# Patient Record
Sex: FEMALE | Race: WHITE | Employment: UNEMPLOYED | URBAN - METROPOLITAN AREA
[De-identification: names, ages, dates, MRNs, and addresses within clinical notes are randomized per-mention and may not be internally consistent; named-entity substitution may affect disease eponyms.]

---

## 2019-02-19 ENCOUNTER — APPOINTMENT (OUTPATIENT)
Dept: GENERAL RADIOLOGY | Age: 49
DRG: 316 | End: 2019-02-19
Attending: PHYSICIAN ASSISTANT
Payer: COMMERCIAL

## 2019-02-19 ENCOUNTER — HOSPITAL ENCOUNTER (INPATIENT)
Age: 49
LOS: 12 days | Discharge: HOME | DRG: 316 | End: 2019-03-06
Attending: EMERGENCY MEDICINE | Admitting: HOSPITALIST
Payer: COMMERCIAL

## 2019-02-19 DIAGNOSIS — B18.2 CHRONIC HEPATITIS C WITHOUT HEPATIC COMA (HCC): ICD-10-CM

## 2019-02-19 DIAGNOSIS — M65.10 INFECTION OF TENDON SHEATH: Primary | ICD-10-CM

## 2019-02-19 PROBLEM — L03.119 CELLULITIS OF HAND: Status: ACTIVE | Noted: 2019-02-19

## 2019-02-19 LAB
ALBUMIN SERPL-MCNC: 3.6 G/DL (ref 3.5–5)
ALBUMIN/GLOB SERPL: 0.9 {RATIO} (ref 1.1–2.2)
ALP SERPL-CCNC: 133 U/L (ref 45–117)
ALT SERPL-CCNC: 257 U/L (ref 12–78)
AMPHET UR QL SCN: POSITIVE
ANION GAP SERPL CALC-SCNC: 6 MMOL/L (ref 5–15)
AST SERPL-CCNC: 173 U/L (ref 15–37)
BARBITURATES UR QL SCN: NEGATIVE
BASOPHILS # BLD: 0 K/UL (ref 0–0.1)
BASOPHILS NFR BLD: 1 % (ref 0–1)
BENZODIAZ UR QL: NEGATIVE
BILIRUB SERPL-MCNC: 0.6 MG/DL (ref 0.2–1)
BUN SERPL-MCNC: 13 MG/DL (ref 6–20)
BUN/CREAT SERPL: 18 (ref 12–20)
CALCIUM SERPL-MCNC: 8.6 MG/DL (ref 8.5–10.1)
CANNABINOIDS UR QL SCN: NEGATIVE
CHLORIDE SERPL-SCNC: 102 MMOL/L (ref 97–108)
CO2 SERPL-SCNC: 29 MMOL/L (ref 21–32)
COCAINE UR QL SCN: POSITIVE
CREAT SERPL-MCNC: 0.74 MG/DL (ref 0.55–1.02)
DIFFERENTIAL METHOD BLD: ABNORMAL
DRUG SCRN COMMENT,DRGCM: ABNORMAL
EOSINOPHIL # BLD: 0.2 K/UL (ref 0–0.4)
EOSINOPHIL NFR BLD: 4 % (ref 0–7)
ERYTHROCYTE [DISTWIDTH] IN BLOOD BY AUTOMATED COUNT: 13.2 % (ref 11.5–14.5)
ETHANOL SERPL-MCNC: <10 MG/DL
GLOBULIN SER CALC-MCNC: 4.1 G/DL (ref 2–4)
GLUCOSE SERPL-MCNC: 87 MG/DL (ref 65–100)
HCG UR QL: NEGATIVE
HCT VFR BLD AUTO: 41.6 % (ref 35–47)
HGB BLD-MCNC: 13.8 G/DL (ref 11.5–16)
IMM GRANULOCYTES # BLD AUTO: 0 K/UL (ref 0–0.04)
IMM GRANULOCYTES NFR BLD AUTO: 0 % (ref 0–0.5)
LACTATE BLD-SCNC: 0.59 MMOL/L (ref 0.4–2)
LYMPHOCYTES # BLD: 1.5 K/UL (ref 0.8–3.5)
LYMPHOCYTES NFR BLD: 26 % (ref 12–49)
MCH RBC QN AUTO: 31.9 PG (ref 26–34)
MCHC RBC AUTO-ENTMCNC: 33.2 G/DL (ref 30–36.5)
MCV RBC AUTO: 96.1 FL (ref 80–99)
METHADONE UR QL: POSITIVE
MONOCYTES # BLD: 0.9 K/UL (ref 0–1)
MONOCYTES NFR BLD: 15 % (ref 5–13)
NEUTS SEG # BLD: 3.1 K/UL (ref 1.8–8)
NEUTS SEG NFR BLD: 54 % (ref 32–75)
NRBC # BLD: 0 K/UL (ref 0–0.01)
NRBC BLD-RTO: 0 PER 100 WBC
OPIATES UR QL: POSITIVE
PCP UR QL: NEGATIVE
PLATELET # BLD AUTO: 182 K/UL (ref 150–400)
PMV BLD AUTO: 10.6 FL (ref 8.9–12.9)
POTASSIUM SERPL-SCNC: 4 MMOL/L (ref 3.5–5.1)
PROT SERPL-MCNC: 7.7 G/DL (ref 6.4–8.2)
RBC # BLD AUTO: 4.33 M/UL (ref 3.8–5.2)
SODIUM SERPL-SCNC: 137 MMOL/L (ref 136–145)
WBC # BLD AUTO: 5.7 K/UL (ref 3.6–11)

## 2019-02-19 PROCEDURE — 96365 THER/PROPH/DIAG IV INF INIT: CPT

## 2019-02-19 PROCEDURE — 74011250636 HC RX REV CODE- 250/636: Performed by: PHYSICIAN ASSISTANT

## 2019-02-19 PROCEDURE — 73130 X-RAY EXAM OF HAND: CPT

## 2019-02-19 PROCEDURE — 36415 COLL VENOUS BLD VENIPUNCTURE: CPT

## 2019-02-19 PROCEDURE — 96366 THER/PROPH/DIAG IV INF ADDON: CPT

## 2019-02-19 PROCEDURE — 96368 THER/DIAG CONCURRENT INF: CPT

## 2019-02-19 PROCEDURE — 74011000258 HC RX REV CODE- 258: Performed by: PHYSICIAN ASSISTANT

## 2019-02-19 PROCEDURE — 74011250636 HC RX REV CODE- 250/636: Performed by: EMERGENCY MEDICINE

## 2019-02-19 PROCEDURE — 80307 DRUG TEST PRSMV CHEM ANLYZR: CPT

## 2019-02-19 PROCEDURE — 80053 COMPREHEN METABOLIC PANEL: CPT

## 2019-02-19 PROCEDURE — 83605 ASSAY OF LACTIC ACID: CPT

## 2019-02-19 PROCEDURE — 99218 HC RM OBSERVATION: CPT

## 2019-02-19 PROCEDURE — 99285 EMERGENCY DEPT VISIT HI MDM: CPT

## 2019-02-19 PROCEDURE — 87040 BLOOD CULTURE FOR BACTERIA: CPT

## 2019-02-19 PROCEDURE — 74011250637 HC RX REV CODE- 250/637: Performed by: HOSPITALIST

## 2019-02-19 PROCEDURE — 81025 URINE PREGNANCY TEST: CPT

## 2019-02-19 PROCEDURE — 96375 TX/PRO/DX INJ NEW DRUG ADDON: CPT

## 2019-02-19 PROCEDURE — 85025 COMPLETE CBC W/AUTO DIFF WBC: CPT

## 2019-02-19 RX ORDER — SODIUM CHLORIDE 0.9 % (FLUSH) 0.9 %
5-40 SYRINGE (ML) INJECTION EVERY 8 HOURS
Status: DISCONTINUED | OUTPATIENT
Start: 2019-02-19 | End: 2019-03-06 | Stop reason: HOSPADM

## 2019-02-19 RX ORDER — SODIUM CHLORIDE 0.9 % (FLUSH) 0.9 %
5-40 SYRINGE (ML) INJECTION AS NEEDED
Status: DISCONTINUED | OUTPATIENT
Start: 2019-02-19 | End: 2019-03-06 | Stop reason: HOSPADM

## 2019-02-19 RX ORDER — METHADONE HYDROCHLORIDE 5 MG/5ML
55 SOLUTION ORAL DAILY
Status: DISCONTINUED | OUTPATIENT
Start: 2019-02-20 | End: 2019-02-27

## 2019-02-19 RX ORDER — KETOROLAC TROMETHAMINE 30 MG/ML
15 INJECTION, SOLUTION INTRAMUSCULAR; INTRAVENOUS
Status: COMPLETED | OUTPATIENT
Start: 2019-02-19 | End: 2019-02-19

## 2019-02-19 RX ORDER — VANCOMYCIN/0.9 % SOD CHLORIDE 1.5G/250ML
1500 PLASTIC BAG, INJECTION (ML) INTRAVENOUS
Status: COMPLETED | OUTPATIENT
Start: 2019-02-19 | End: 2019-02-19

## 2019-02-19 RX ORDER — IBUPROFEN 400 MG/1
400 TABLET ORAL 3 TIMES DAILY
Status: DISCONTINUED | OUTPATIENT
Start: 2019-02-19 | End: 2019-03-06 | Stop reason: HOSPADM

## 2019-02-19 RX ADMIN — Medication 10 ML: at 17:44

## 2019-02-19 RX ADMIN — VANCOMYCIN HYDROCHLORIDE 1500 MG: 10 INJECTION, POWDER, LYOPHILIZED, FOR SOLUTION INTRAVENOUS at 12:19

## 2019-02-19 RX ADMIN — KETOROLAC TROMETHAMINE 15 MG: 30 INJECTION, SOLUTION INTRAMUSCULAR at 12:01

## 2019-02-19 RX ADMIN — IBUPROFEN 400 MG: 400 TABLET ORAL at 17:43

## 2019-02-19 RX ADMIN — PIPERACILLIN SODIUM AND TAZOBACTAM SODIUM 3.38 G: 3; .375 INJECTION, POWDER, LYOPHILIZED, FOR SOLUTION INTRAVENOUS at 11:56

## 2019-02-19 RX ADMIN — IBUPROFEN 400 MG: 400 TABLET ORAL at 20:36

## 2019-02-19 RX ADMIN — Medication 10 ML: at 20:36

## 2019-02-19 NOTE — PROGRESS NOTES
Pharmacist Note - Vancomycin Dosing    Consult provided for this 53 y.o. female for indication of right hand middle finger pain/swelling    Antibiotic regimen(s): Zosyn+vancomycin    Recent Labs     19  1113   WBC 5.7   CREA 0.74   BUN 13     Frequency of BMP: daily  Height: 162.6 cm  Weight: 56.7 kg  Est CrCl: ~ 80  ml/min; Temp (24hrs), Av °F (36.7 °C), Min:97.7 °F (36.5 °C), Max:98.2 °F (36.8 °C)    Cultures:   blood in process    Goal trough = 10 - 15 mcg/mL    Therapy will be initiated with a loading dose of 1500 mg IV x 1 to be followed by a maintenance dose of 1000 mg IV every 12 hours. Pharmacy to follow patient daily and order levels / make dose adjustments as appropriate.
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
## 2019-02-19 NOTE — ED NOTES
10:46 AM 
I have evaluated the patient as the Provider in Triage. I have reviewed Her vital signs and the triage nurse assessment. I have talked with the patient and any available family and advised that I am the provider in triage and have ordered the appropriate study to initiate their work up based on the clinical presentation during my assessment. I have advised that the patient will be accommodated in the Main ED as soon as possible. I have also requested to contact the triage nurse or myself immediately if the patient experiences any changes in their condition during this brief waiting period. TIM Caro 
 
 
54 y/o female with right hand/finger cellulitis concerning for infectious tenosynovitis. Will order basic labs, xr right hand to evaluate for any possible FB and start IV abx. Discussed with and evaluated by Dr. Rajesh Freedman

## 2019-02-19 NOTE — ED TRIAGE NOTES
Pt slammed her right finger in a wood door three days ago and states that it started swelling and throbbing yesterday. Pt states that she had slight chills last night. Finger is swollen, red and warm to touch.

## 2019-02-19 NOTE — PROGRESS NOTES
Problem: Falls - Risk of  Goal: *Absence of Falls  Document [facility] Fall Risk and appropriate interventions in the flowsheet.   Outcome: Progressing Towards Goal  Fall Risk Interventions:

## 2019-02-19 NOTE — ED PROVIDER NOTES
53 y.o. female with past medical history significant for hepatitis C, who presents ambulatory to the ED with cc of hand pain. Pt states she sustained an injury to the 3rd digit of her right hand 5 days ago caused by \"it got slammed in a door. \" She states it was sore afterwards, but appeared to improve. She notes there was a [employer]" initially that may have been a splinter but it has disappeared. However, she reports a blister developed over the palmar aspect of the DIP 2 days ago. She states symptoms worsened yesterday with swelling, redness, and \"throbbing\" pain throughout her right hand. She notes she applied a topical OTC ointment to the blister and iced her hand without improvement. She notes she is right-handed. Pt also reports history of IV heroin use, most recently 3 days ago. She states she goes to a Methadone clinic daily. Pt specifically denies any fever. There are no other acute medical concerns at this time. Social Hx: daily Tobacco use (1 ppd), denies EtOH use, yes Illicit Drug use (heroin, methadone)  PCP: None    Note written by Glendy Guerrier, as dictated by Alexx Cotton MD 11:10 AM        The history is provided by the patient. No  was used. Past Medical History:   Diagnosis Date    Liver disease     hep c       Past Surgical History:   Procedure Laterality Date    BREAST SURGERY PROCEDURE UNLISTED      lumpectomy    HX GYN               History reviewed. No pertinent family history.     Social History     Socioeconomic History    Marital status: Not on file     Spouse name: Not on file    Number of children: Not on file    Years of education: Not on file    Highest education level: Not on file   Social Needs    Financial resource strain: Not on file    Food insecurity - worry: Not on file    Food insecurity - inability: Not on file    Transportation needs - medical: Not on file    Transportation needs - non-medical: Not on file Occupational History    Not on file   Tobacco Use    Smoking status: Current Every Day Smoker     Packs/day: 1.00    Smokeless tobacco: Never Used   Substance and Sexual Activity    Alcohol use: No     Frequency: Never    Drug use: Yes     Types: Prescription     Comment: pt is on methadone    Sexual activity: Not on file   Other Topics Concern    Not on file   Social History Narrative    Not on file         ALLERGIES: Patient has no known allergies. Review of Systems   Constitutional: Negative for appetite change and fever. HENT: Negative for rhinorrhea, sore throat and trouble swallowing. Eyes: Negative for photophobia. Respiratory: Negative for cough and shortness of breath. Cardiovascular: Negative for chest pain and palpitations. Gastrointestinal: Negative for abdominal pain, nausea and vomiting. Genitourinary: Negative for dysuria, frequency and hematuria. Musculoskeletal: Negative for arthralgias. Skin: Positive for wound (blister R hand 3rd digit). + redness, swelling of R hand   Neurological: Negative for dizziness, syncope and weakness. Psychiatric/Behavioral: Negative for behavioral problems. The patient is not nervous/anxious. Vitals:    02/19/19 1025 02/19/19 1039 02/19/19 1042   BP:   (!) 134/91   Pulse: 87  83   Resp:   16   Temp:   97.7 °F (36.5 °C)   SpO2: 98%  98%   Weight:  56.7 kg (125 lb)    Height:  5' 4\" (1.626 m)             Physical Exam   Constitutional: She appears well-developed and well-nourished. HENT:   Head: Normocephalic and atraumatic. Mouth/Throat: Oropharynx is clear and moist.   Eyes: EOM are normal. Pupils are equal, round, and reactive to light. Neck: Normal range of motion. Neck supple. Cardiovascular: Normal rate, regular rhythm, normal heart sounds and intact distal pulses. Exam reveals no gallop and no friction rub. No murmur heard. Pulmonary/Chest: Effort normal. No respiratory distress. She has no wheezes.  She has no rales. Abdominal: Soft. There is no tenderness. There is no rebound. Musculoskeletal: Normal range of motion. Moderate swelling of the R middle finger, unable to flex 2nd and 3rd fingers   Neurological: She is alert. No cranial nerve deficit. Motor; symmetric   Skin:   Blister over palmar aspect of R 3rd DIP   Psychiatric: She has a normal mood and affect. Her behavior is normal.   Nursing note and vitals reviewed. Note written by Allison Field, as dictated by Glenn Cantu MD 11:10 AM    MDM       Procedures    CONSULT NOTE:  12:29 PM Glenn Cantu MD spoke with Dr. Jason Cunningham, Consult for Hospitalist.  Discussed available diagnostic tests and clinical findings. Dr. Sincere Mckeon will admit. CONSULT NOTE:   Glenn Cantu MD spoke with Dr. Dean Barber and Will TIM Youssef, Consult for Orthopedics. Discussed available diagnostic tests and clinical findings. They have consulted and seen the pt.    2:24 PM  Talked to Christen Armas, hospital supervisor, wanted me to call head of orthopedics Dr. Wanda Vasquez and if no one is available then we should consult [facility]. Note: Patient has been seen by medicine who admitted her. The orthopedic physician assistant and orthopedist have seen the patient. [facility] who is on call does not have a hand surgeon available. Patient is stable. She has received IV antibiotics ordered in triage. She does not feel like the infection has spread from her palm area since arriving in the ER. Orthopedic group has requested that the patient be transferred . I contacted hospital administration and Christen Armas came to the department. She says I'm supposed to contact the head of the orthopedic group. I am told that Dr. Lora Zarate is in surgery all day and is unlikely get back to me anytime soon. Keenan Cuellar MD  3:04 PM    CONSULT NOTE:  3:08 PM Glenn Cantu MD spoke with Dr. Tyler Montoya. Rex Edwards for [employer].   Discussed available diagnostic tests and clinical findings. Dr. Treva Demarco will see pt. States if she doesn't need surgery today, he will follow her. If she does need surgery today he will transfer her.

## 2019-02-19 NOTE — CONSULTS
ORTHO CONSULT NOTE    Date of Consultation:  2019  Referring Physician:  Makenna Kang MD  CC: right hand pain    HPI:  Jose Zamarripa is a 53 y. o.right hand dom female PMH Hep C, illicit drug use, and currently at methadone clinic who c/o right hand middle finger pain/swelling. She describes the middle finger getting caught in a wooden door at home about 5 days ago with initial soreness. She denies an obvious laceration. She did not see a splinter or FB. The throbbing pain and swelling worsened last night causing her to seek care here at [facility] ER. She has been painting skin with OTC topical anti-septic but denies drainage and open wound. She felt feverish last night but did not take her temperature. Last oral intake last night. She denies previous orthopedic surgery. History reviewed. No pertinent family history. Social History     Tobacco Use    Smoking status: Current Every Day Smoker     Packs/day: 1.00    Smokeless tobacco: Never Used   Substance Use Topics    Alcohol use: No     Frequency: Never     Past Medical History:   Diagnosis Date    Liver disease     hep c      Past Surgical History:   Procedure Laterality Date    BREAST SURGERY PROCEDURE UNLISTED      lumpectomy    HX GYN            Prior to Admission medications    Medication Sig Start Date End Date Taking? Authorizing Provider   methadone (DOLOPHINE) 10 mg/mL solution Take 55 mg by mouth daily. Yes Provider, Historical     No Known Allergies     Review of Systems:  Per HPI.       Objective:     Patient Vitals for the past 8 hrs:   BP Temp Pulse Resp SpO2 Height Weight   19 1315 118/79 98.2 °F (36.8 °C) 64 16 98 % -- --   19 1300 126/82 -- -- -- 98 % -- --   19 1230 121/84 -- -- -- 97 % -- --   19 1130 128/84 -- -- -- 99 % -- --   19 1042 (!) 134/91 97.7 °F (36.5 °C) 83 16 98 % -- --   19 1039 -- -- -- -- -- 5' 4\" (1.626 m) 56.7 kg (125 lb)   19 1025 -- -- 87 -- 98 % -- --     Temp (24hrs), Av °F (36.7 °C), Min:97.7 °F (36.5 °C), Max:98.2 °F (36.8 °C)      EXAM:   NAD. Lying in ER stretcher. Moves left UE well. Right hand volar swelling middle finger DIP extending proximally into palm with corresponding tenderness. There is no open wound. Mild tenderness volar index as well. Unable to actively/passively extend middle finger d/t pain. I can passively extend other fingers without pain. Erythema volar and dorsal hand with no streaking. Moves wrist/elbow OK. Strong pulses. CR brisk. SILT all digits. Tattoos bilat UE forearm. Imaging Review:   Results from Hospital Encounter encounter on 19   XR HAND RT MIN 3 V    Narrative EXAM: XR HAND RT MIN 3 V    INDICATION: eval for foreign body in right 3rd finger. COMPARISON: None. FINDINGS: Three views of the right hand are limited by patient inability to  completely extend their digits. The examination demonstrates no fracture or  other acute osseous or articular abnormality. Soft tissue swelling of the third  digit is noted without radiopaque foreign body. Impression IMPRESSION: Soft tissue swelling third digit without fracture or radiopaque  foreign body. No results found for this or any previous visit. Labs:   WBC 5.7, elevated liver enzymes, eGFR > 60. tox screen + amphetamines, cocaine, and methadone. Impression:     Patient Active Problem List    Diagnosis Date Noted    Cellulitis of hand 2019     Principal Problem:    Cellulitis of hand (2019)    Concern for septic flexor tenosynovitis right hand middle finger. Plan:   Patient examined by myself and Dr. Ana Valiente covering unreferred orthopedic call. Given concerns for septic hand tenosynovitis, patient needs eval by fellowship hand/wrist surgeon for I&D. As of this time, there is no hand surgeon available here at [facility]. Surgeon recommends transfer to facility with ortho hand call schedule.     D/W  TIM Mensah

## 2019-02-19 NOTE — PROGRESS NOTES
TRANSFER - IN REPORT:    Verbal report received from Tere(name) on Jarrell Esposito  being received from ED(unit) for routine progression of care      Report consisted of patients Situation, Background, Assessment and   Recommendations(SBAR). Information from the following report(s) SBAR, ED Summary, Intake/Output and MAR was reviewed with the receiving nurse. Opportunity for questions and clarification was provided. Assessment completed upon patients arrival to unit and care assumed.

## 2019-02-19 NOTE — ED NOTES
Patient resting comfortably in bed, bed locked & low, call bell within reach. Vancomycin infusing via PIV. Patient denies further needs at this time.

## 2019-02-19 NOTE — ED NOTES
Dr. Adeline Lopez at bedside    Assumed care of patient from [facility]. Pt shut hand in door 5 days ago. Right pointer and middle finger swelling also  blister and ecchymosis present on middle finger. Pt states finger was sore a few days and became worse last night at work. Pt felt feverish at work but did not take temperature.

## 2019-02-19 NOTE — ROUTINE PROCESS
TRANSFER - OUT REPORT:    Verbal report given to Niurka(name) on Christensen Bars  being transferred to [facility](unit) for routine progression of care       Report consisted of patients Situation, Background, Assessment and   Recommendations(SBAR). Information from the following report(s) SBAR, ED Summary, Intake/Output, MAR and Recent Results was reviewed with the receiving nurse. Lines:   Peripheral IV 02/19/19 Left Antecubital (Active)       Peripheral IV 02/19/19 Left Forearm (Active)        Opportunity for questions and clarification was provided.       Patient transported with:   [place]

## 2019-02-19 NOTE — PROGRESS NOTES
Admission Medication Reconciliation:    Information obtained from: [facility] nurse Amanda Males    Significant PMH/Disease States:   Past Medical History:   Diagnosis Date    Liver disease     hep c       Chief Complaint for this Admission:  Hand problem    Allergies:  Patient has no known allergies. Prior to Admission Medications:   Prior to Admission Medications   Prescriptions Last Dose Informant Patient Reported? Taking? methadone (DOLOPHINE) 10 mg/mL solution 2/19/2019 at Unknown time  Yes Yes   Sig: Take 55 mg by mouth daily. Facility-Administered Medications: None         Comments/Recommendations: Facility confirmed methadone dose after consent was signed and faxed back to facility. Updated Dr Lesly Arboleda. Added methadone. Thank you for allowing me to participate in the care of your patient.     Suraj Bray PharmD, RN #[id number]

## 2019-02-19 NOTE — CONSULTS
Consult    Patient: Eloisa Roca MRN: [id number]  SSN: [id number]    YOB: 1970  Age: 53 y.o. Sex: female      Subjective:      Eloisa Roca is a 53 y.o. female who is being seen for her right hand pain and swelling in ER which is progressing in last day. She does have PMHx of Hep C, illicit drug use, and developed right hand middle finger pain/swelling which is impacting her . She describes the middle finger getting caught in a wooden door at home about 5 days ago with initial pain that mostly resolved. Her pain and swelling worsened last night causing her to seek care here at [facility] ER. She denies open injury or open wound. She felt like she may have had fever/chills last night but did not take her temperature. Last oral intake last night. Past Medical History:   Diagnosis Date    Liver disease     hep c     Past Surgical History:   Procedure Laterality Date    BREAST SURGERY PROCEDURE UNLISTED      lumpectomy    HX GYN            History reviewed. No pertinent family history. Social History     Tobacco Use    Smoking status: Current Every Day Smoker     Packs/day: 1.00    Smokeless tobacco: Never Used   Substance Use Topics    Alcohol use: No     Frequency: Never      Current Facility-Administered Medications   Medication Dose Route Frequency Provider Last Rate Last Dose    Vancomycin Pharmacy Dosing   Other PRN Helder Conner MD        [START ON 2019] vancomycin (VANCOCIN) 1,000 mg in 0.9% sodium chloride (MBP/ADV) 250 mL  1,000 mg IntraVENous Q12H Helder Conner MD         Current Outpatient Medications   Medication Sig Dispense Refill    methadone (DOLOPHINE) 10 mg/mL solution Take 55 mg by mouth daily. No Known Allergies    Review of Systems:  A comprehensive review of systems was negative except for that written in the History of Present Illness.     Objective:     Vitals:    19 1330 19 1415 19 1445 19 1500   BP: 121/82 126/81 134/81 133/82   Pulse:    72   Resp:    16   Temp:    98.2 °F (36.8 °C)   SpO2: 97% 97% 98% 98%   Weight:       Height:            Physical Exam:  General: Awake and alert, No acute distress  Lungs: No obvious labored breathing present; Breath sounds are present  Heart: S1/S2 are audible  Extremities:   RUE: No open wounds are present. No lacerations. No active drainage. Pain and with range of motion of the right middle finger with fusiform middle finger swelling. Extension of the finger is very painful and swelling limits full extension. There is erythema throughout the plamar>dorsal aspects of the middle finger radiating to the palm. +2 radial pulse. Sensation intact. Limited motion of the middle finger PIP/DIP MCP joints due to pain. There is evidence of lymph tracting to the dsital forearm. I can passively extend other fingers without pain. Sensation grossly intact throughout      Exam of the contralateral extremity reveals excellent motion, no lesion of the skin, NVI and no lympadema      Imaging: XR right hand 3 views demonstrates soft tissue swelling of middle digit.  No foreign body or fracture is seen    Assessment:     Hospital Problems  Date Reviewed: 2/19/2019          Codes Class Noted POA    * (Principal) Cellulitis of hand ICD-10-CM: M93.827  ICD-9-CM: 682.4  2/19/2019 Unknown          Right middle finger flexor tenosynovitis    Plan:   Keep patient NPO  Will discuss with hand team (Dr Steven Dent) for definitive management as it will likely involve open surgical debridement by Hand specialist acutely  If hand team cannot manage recommend transfer to facility with hand coverage        Signed By: Kasi Davis MD     February 19, 2019

## 2019-02-20 ENCOUNTER — ANESTHESIA (OUTPATIENT)
Dept: MEDSURG UNIT | Age: 49
DRG: 316 | End: 2019-02-20
Payer: COMMERCIAL

## 2019-02-20 ENCOUNTER — ANESTHESIA EVENT (OUTPATIENT)
Dept: MEDSURG UNIT | Age: 49
DRG: 316 | End: 2019-02-20
Payer: COMMERCIAL

## 2019-02-20 LAB
ERYTHROCYTE [DISTWIDTH] IN BLOOD BY AUTOMATED COUNT: 13.2 % (ref 11.5–14.5)
HCG UR QL: NEGATIVE
HCT VFR BLD AUTO: 40.2 % (ref 35–47)
HGB BLD-MCNC: 13.1 G/DL (ref 11.5–16)
INR PPP: 1.1 (ref 0.9–1.1)
MCH RBC QN AUTO: 31.1 PG (ref 26–34)
MCHC RBC AUTO-ENTMCNC: 32.6 G/DL (ref 30–36.5)
MCV RBC AUTO: 95.5 FL (ref 80–99)
NRBC # BLD: 0 K/UL (ref 0–0.01)
NRBC BLD-RTO: 0 PER 100 WBC
PLATELET # BLD AUTO: 190 K/UL (ref 150–400)
PMV BLD AUTO: 10.6 FL (ref 8.9–12.9)
PROTHROMBIN TIME: 10.9 SEC (ref 9–11.1)
RBC # BLD AUTO: 4.21 M/UL (ref 3.8–5.2)
WBC # BLD AUTO: 4.4 K/UL (ref 3.6–11)

## 2019-02-20 PROCEDURE — 76210000034 HC AMBSU PH I REC 0.5 TO 1 HR: Performed by: ORTHOPAEDIC SURGERY

## 2019-02-20 PROCEDURE — 94760 N-INVAS EAR/PLS OXIMETRY 1: CPT

## 2019-02-20 PROCEDURE — 87147 CULTURE TYPE IMMUNOLOGIC: CPT

## 2019-02-20 PROCEDURE — 76060000061 HC AMB SURG ANES 0.5 TO 1 HR: Performed by: ORTHOPAEDIC SURGERY

## 2019-02-20 PROCEDURE — 36600 WITHDRAWAL OF ARTERIAL BLOOD: CPT

## 2019-02-20 PROCEDURE — 99218 HC RM OBSERVATION: CPT

## 2019-02-20 PROCEDURE — 74011250636 HC RX REV CODE- 250/636

## 2019-02-20 PROCEDURE — 87077 CULTURE AEROBIC IDENTIFY: CPT

## 2019-02-20 PROCEDURE — 0LD70ZZ EXTRACTION OF RIGHT HAND TENDON, OPEN APPROACH: ICD-10-PCS | Performed by: ORTHOPAEDIC SURGERY

## 2019-02-20 PROCEDURE — 87205 SMEAR GRAM STAIN: CPT

## 2019-02-20 PROCEDURE — 36415 COLL VENOUS BLD VENIPUNCTURE: CPT

## 2019-02-20 PROCEDURE — 77030020782 HC GWN BAIR PAWS FLX 3M -B

## 2019-02-20 PROCEDURE — 74011000250 HC RX REV CODE- 250: Performed by: ORTHOPAEDIC SURGERY

## 2019-02-20 PROCEDURE — 81025 URINE PREGNANCY TEST: CPT

## 2019-02-20 PROCEDURE — 85027 COMPLETE CBC AUTOMATED: CPT

## 2019-02-20 PROCEDURE — 74011000250 HC RX REV CODE- 250

## 2019-02-20 PROCEDURE — 74011250636 HC RX REV CODE- 250/636: Performed by: HOSPITALIST

## 2019-02-20 PROCEDURE — 85610 PROTHROMBIN TIME: CPT

## 2019-02-20 PROCEDURE — 74011250637 HC RX REV CODE- 250/637: Performed by: HOSPITALIST

## 2019-02-20 PROCEDURE — 87186 SC STD MICRODIL/AGAR DIL: CPT

## 2019-02-20 PROCEDURE — 74011000258 HC RX REV CODE- 258: Performed by: HOSPITALIST

## 2019-02-20 PROCEDURE — 77030013479 HC CUF TRNQ COT DGT MARM -A: Performed by: ORTHOPAEDIC SURGERY

## 2019-02-20 PROCEDURE — 77030002916 HC SUT ETHLN J&J -A: Performed by: ORTHOPAEDIC SURGERY

## 2019-02-20 PROCEDURE — 76030000000 HC AMB SURG OR TIME 0.5 TO 1: Performed by: ORTHOPAEDIC SURGERY

## 2019-02-20 PROCEDURE — 77030018836 HC SOL IRR NACL ICUM -A: Performed by: ORTHOPAEDIC SURGERY

## 2019-02-20 PROCEDURE — 87075 CULTR BACTERIA EXCEPT BLOOD: CPT

## 2019-02-20 RX ORDER — ONDANSETRON 2 MG/ML
4 INJECTION INTRAMUSCULAR; INTRAVENOUS AS NEEDED
Status: DISCONTINUED | OUTPATIENT
Start: 2019-02-20 | End: 2019-02-20 | Stop reason: HOSPADM

## 2019-02-20 RX ORDER — SODIUM CHLORIDE, SODIUM LACTATE, POTASSIUM CHLORIDE, CALCIUM CHLORIDE 600; 310; 30; 20 MG/100ML; MG/100ML; MG/100ML; MG/100ML
INJECTION, SOLUTION INTRAVENOUS
Status: DISCONTINUED | OUTPATIENT
Start: 2019-02-20 | End: 2019-02-20 | Stop reason: HOSPADM

## 2019-02-20 RX ORDER — MIDAZOLAM HYDROCHLORIDE 1 MG/ML
1 INJECTION, SOLUTION INTRAMUSCULAR; INTRAVENOUS AS NEEDED
Status: CANCELLED | OUTPATIENT
Start: 2019-02-20

## 2019-02-20 RX ORDER — MIDAZOLAM HYDROCHLORIDE 1 MG/ML
INJECTION, SOLUTION INTRAMUSCULAR; INTRAVENOUS AS NEEDED
Status: DISCONTINUED | OUTPATIENT
Start: 2019-02-20 | End: 2019-02-20 | Stop reason: HOSPADM

## 2019-02-20 RX ORDER — DEXAMETHASONE SODIUM PHOSPHATE 4 MG/ML
INJECTION, SOLUTION INTRA-ARTICULAR; INTRALESIONAL; INTRAMUSCULAR; INTRAVENOUS; SOFT TISSUE AS NEEDED
Status: DISCONTINUED | OUTPATIENT
Start: 2019-02-20 | End: 2019-02-20 | Stop reason: HOSPADM

## 2019-02-20 RX ORDER — DIPHENHYDRAMINE HYDROCHLORIDE 50 MG/ML
12.5 INJECTION, SOLUTION INTRAMUSCULAR; INTRAVENOUS AS NEEDED
Status: DISCONTINUED | OUTPATIENT
Start: 2019-02-20 | End: 2019-02-20 | Stop reason: HOSPADM

## 2019-02-20 RX ORDER — SODIUM CHLORIDE 0.9 % (FLUSH) 0.9 %
5-40 SYRINGE (ML) INJECTION EVERY 8 HOURS
Status: CANCELLED | OUTPATIENT
Start: 2019-02-20

## 2019-02-20 RX ORDER — MIDAZOLAM HYDROCHLORIDE 1 MG/ML
0.5 INJECTION, SOLUTION INTRAMUSCULAR; INTRAVENOUS
Status: DISCONTINUED | OUTPATIENT
Start: 2019-02-20 | End: 2019-02-20 | Stop reason: HOSPADM

## 2019-02-20 RX ORDER — SODIUM CHLORIDE 9 MG/ML
50 INJECTION, SOLUTION INTRAVENOUS CONTINUOUS
Status: CANCELLED | OUTPATIENT
Start: 2019-02-20 | End: 2019-02-21

## 2019-02-20 RX ORDER — PROPOFOL 10 MG/ML
INJECTION, EMULSION INTRAVENOUS
Status: DISCONTINUED | OUTPATIENT
Start: 2019-02-20 | End: 2019-02-20 | Stop reason: HOSPADM

## 2019-02-20 RX ORDER — KETAMINE HYDROCHLORIDE 10 MG/ML
INJECTION, SOLUTION INTRAMUSCULAR; INTRAVENOUS AS NEEDED
Status: DISCONTINUED | OUTPATIENT
Start: 2019-02-20 | End: 2019-02-20 | Stop reason: HOSPADM

## 2019-02-20 RX ORDER — SODIUM CHLORIDE 0.9 % (FLUSH) 0.9 %
5-40 SYRINGE (ML) INJECTION AS NEEDED
Status: CANCELLED | OUTPATIENT
Start: 2019-02-20

## 2019-02-20 RX ORDER — ONDANSETRON 2 MG/ML
INJECTION INTRAMUSCULAR; INTRAVENOUS AS NEEDED
Status: DISCONTINUED | OUTPATIENT
Start: 2019-02-20 | End: 2019-02-20 | Stop reason: HOSPADM

## 2019-02-20 RX ORDER — HEPARIN SODIUM 5000 [USP'U]/ML
5000 INJECTION, SOLUTION INTRAVENOUS; SUBCUTANEOUS EVERY 12 HOURS
Status: DISCONTINUED | OUTPATIENT
Start: 2019-02-20 | End: 2019-03-06 | Stop reason: HOSPADM

## 2019-02-20 RX ORDER — SODIUM CHLORIDE, SODIUM LACTATE, POTASSIUM CHLORIDE, CALCIUM CHLORIDE 600; 310; 30; 20 MG/100ML; MG/100ML; MG/100ML; MG/100ML
75 INJECTION, SOLUTION INTRAVENOUS CONTINUOUS
Status: DISCONTINUED | OUTPATIENT
Start: 2019-02-20 | End: 2019-02-20 | Stop reason: HOSPADM

## 2019-02-20 RX ORDER — SODIUM CHLORIDE 0.9 % (FLUSH) 0.9 %
5-40 SYRINGE (ML) INJECTION EVERY 8 HOURS
Status: DISCONTINUED | OUTPATIENT
Start: 2019-02-20 | End: 2019-02-20 | Stop reason: HOSPADM

## 2019-02-20 RX ORDER — SODIUM CHLORIDE, SODIUM LACTATE, POTASSIUM CHLORIDE, CALCIUM CHLORIDE 600; 310; 30; 20 MG/100ML; MG/100ML; MG/100ML; MG/100ML
125 INJECTION, SOLUTION INTRAVENOUS CONTINUOUS
Status: CANCELLED | OUTPATIENT
Start: 2019-02-20 | End: 2019-02-21

## 2019-02-20 RX ORDER — PROPOFOL 10 MG/ML
INJECTION, EMULSION INTRAVENOUS AS NEEDED
Status: DISCONTINUED | OUTPATIENT
Start: 2019-02-20 | End: 2019-02-20 | Stop reason: HOSPADM

## 2019-02-20 RX ORDER — SODIUM CHLORIDE 9 MG/ML
25 INJECTION, SOLUTION INTRAVENOUS CONTINUOUS
Status: DISCONTINUED | OUTPATIENT
Start: 2019-02-20 | End: 2019-02-20 | Stop reason: HOSPADM

## 2019-02-20 RX ORDER — HYDROCODONE BITARTRATE AND ACETAMINOPHEN 5; 325 MG/1; MG/1
1 TABLET ORAL AS NEEDED
Status: DISCONTINUED | OUTPATIENT
Start: 2019-02-20 | End: 2019-02-20 | Stop reason: HOSPADM

## 2019-02-20 RX ORDER — LIDOCAINE HYDROCHLORIDE 10 MG/ML
0.1 INJECTION, SOLUTION EPIDURAL; INFILTRATION; INTRACAUDAL; PERINEURAL AS NEEDED
Status: CANCELLED | OUTPATIENT
Start: 2019-02-20

## 2019-02-20 RX ORDER — HYDROMORPHONE HYDROCHLORIDE 1 MG/ML
0.2 INJECTION, SOLUTION INTRAMUSCULAR; INTRAVENOUS; SUBCUTANEOUS
Status: DISCONTINUED | OUTPATIENT
Start: 2019-02-20 | End: 2019-02-20 | Stop reason: HOSPADM

## 2019-02-20 RX ORDER — MORPHINE SULFATE 10 MG/ML
2 INJECTION, SOLUTION INTRAMUSCULAR; INTRAVENOUS
Status: DISCONTINUED | OUTPATIENT
Start: 2019-02-20 | End: 2019-02-20 | Stop reason: HOSPADM

## 2019-02-20 RX ORDER — FENTANYL CITRATE 50 UG/ML
25 INJECTION, SOLUTION INTRAMUSCULAR; INTRAVENOUS
Status: DISCONTINUED | OUTPATIENT
Start: 2019-02-20 | End: 2019-02-20 | Stop reason: HOSPADM

## 2019-02-20 RX ORDER — SODIUM CHLORIDE 0.9 % (FLUSH) 0.9 %
5-40 SYRINGE (ML) INJECTION AS NEEDED
Status: DISCONTINUED | OUTPATIENT
Start: 2019-02-20 | End: 2019-02-20 | Stop reason: HOSPADM

## 2019-02-20 RX ORDER — DEXMEDETOMIDINE HYDROCHLORIDE 4 UG/ML
INJECTION, SOLUTION INTRAVENOUS AS NEEDED
Status: DISCONTINUED | OUTPATIENT
Start: 2019-02-20 | End: 2019-02-20 | Stop reason: HOSPADM

## 2019-02-20 RX ORDER — FENTANYL CITRATE 50 UG/ML
50 INJECTION, SOLUTION INTRAMUSCULAR; INTRAVENOUS AS NEEDED
Status: CANCELLED | OUTPATIENT
Start: 2019-02-20

## 2019-02-20 RX ADMIN — METHADONE HYDROCHLORIDE 55 MG: 5 SOLUTION ORAL at 10:53

## 2019-02-20 RX ADMIN — VANCOMYCIN HYDROCHLORIDE 1000 MG: 1 INJECTION, POWDER, LYOPHILIZED, FOR SOLUTION INTRAVENOUS at 01:05

## 2019-02-20 RX ADMIN — SODIUM CHLORIDE, SODIUM LACTATE, POTASSIUM CHLORIDE, CALCIUM CHLORIDE: 600; 310; 30; 20 INJECTION, SOLUTION INTRAVENOUS at 07:15

## 2019-02-20 RX ADMIN — IBUPROFEN 400 MG: 400 TABLET ORAL at 15:38

## 2019-02-20 RX ADMIN — VANCOMYCIN HYDROCHLORIDE 1000 MG: 1 INJECTION, POWDER, LYOPHILIZED, FOR SOLUTION INTRAVENOUS at 13:00

## 2019-02-20 RX ADMIN — MIDAZOLAM HYDROCHLORIDE 1 MG: 1 INJECTION, SOLUTION INTRAMUSCULAR; INTRAVENOUS at 07:30

## 2019-02-20 RX ADMIN — Medication 10 ML: at 14:52

## 2019-02-20 RX ADMIN — DEXMEDETOMIDINE HYDROCHLORIDE 4 MCG: 4 INJECTION, SOLUTION INTRAVENOUS at 07:47

## 2019-02-20 RX ADMIN — PROPOFOL 30 MG: 10 INJECTION, EMULSION INTRAVENOUS at 07:38

## 2019-02-20 RX ADMIN — PROPOFOL 30 MG: 10 INJECTION, EMULSION INTRAVENOUS at 07:36

## 2019-02-20 RX ADMIN — ONDANSETRON 4 MG: 2 INJECTION INTRAMUSCULAR; INTRAVENOUS at 07:41

## 2019-02-20 RX ADMIN — MIDAZOLAM HYDROCHLORIDE 3 MG: 1 INJECTION, SOLUTION INTRAMUSCULAR; INTRAVENOUS at 07:28

## 2019-02-20 RX ADMIN — DEXMEDETOMIDINE HYDROCHLORIDE 4 MCG: 4 INJECTION, SOLUTION INTRAVENOUS at 08:12

## 2019-02-20 RX ADMIN — KETAMINE HYDROCHLORIDE 20 MG: 10 INJECTION, SOLUTION INTRAMUSCULAR; INTRAVENOUS at 07:40

## 2019-02-20 RX ADMIN — DEXMEDETOMIDINE HYDROCHLORIDE 4 MCG: 4 INJECTION, SOLUTION INTRAVENOUS at 07:50

## 2019-02-20 RX ADMIN — HEPARIN SODIUM 5000 UNITS: 5000 INJECTION INTRAVENOUS; SUBCUTANEOUS at 19:24

## 2019-02-20 RX ADMIN — PROPOFOL 50 MCG/KG/MIN: 10 INJECTION, EMULSION INTRAVENOUS at 07:38

## 2019-02-20 RX ADMIN — Medication 10 ML: at 21:30

## 2019-02-20 RX ADMIN — KETAMINE HYDROCHLORIDE 10 MG: 10 INJECTION, SOLUTION INTRAMUSCULAR; INTRAVENOUS at 07:44

## 2019-02-20 RX ADMIN — IBUPROFEN 400 MG: 400 TABLET ORAL at 10:50

## 2019-02-20 RX ADMIN — DEXMEDETOMIDINE HYDROCHLORIDE 4 MCG: 4 INJECTION, SOLUTION INTRAVENOUS at 07:49

## 2019-02-20 RX ADMIN — CEFTRIAXONE SODIUM 2 G: 2 INJECTION, POWDER, FOR SOLUTION INTRAMUSCULAR; INTRAVENOUS at 15:46

## 2019-02-20 RX ADMIN — IBUPROFEN 400 MG: 400 TABLET ORAL at 21:30

## 2019-02-20 RX ADMIN — MIDAZOLAM HYDROCHLORIDE 1 MG: 1 INJECTION, SOLUTION INTRAMUSCULAR; INTRAVENOUS at 07:32

## 2019-02-20 RX ADMIN — DEXMEDETOMIDINE HYDROCHLORIDE 4 MCG: 4 INJECTION, SOLUTION INTRAVENOUS at 07:48

## 2019-02-20 RX ADMIN — Medication 10 ML: at 01:14

## 2019-02-20 RX ADMIN — DEXAMETHASONE SODIUM PHOSPHATE 4 MG: 4 INJECTION, SOLUTION INTRA-ARTICULAR; INTRALESIONAL; INTRAMUSCULAR; INTRAVENOUS; SOFT TISSUE at 07:41

## 2019-02-20 NOTE — ANESTHESIA POSTPROCEDURE EVALUATION
Procedure(s):  INCISION AND DRAINAGE RIGHT MIDDLE FINGER.    <BSHSIANPOST>    Visit Vitals  /71   Pulse 68   Temp 36.5 °C (97.7 °F)   Resp 12   Ht 5' 4\" (1.626 m)   Wt 56.7 kg (125 lb)   SpO2 99%   BMI 21.46 kg/m²

## 2019-02-20 NOTE — PROGRESS NOTES
Hospitalist Progress Note  Elba Mendes MD  Answering service: [phone] OR [phone] from in house phone      Date of Service:  2019  NAME:  Jose Zamarripa  :  1970  MRN:  [id number]      Admission Summary:   Pt admitted for Right hand pain ans swelling after hitting it      Interval history / Subjective:     Pt seen in follow up of Right Middle Finger Tenosynovitis    Pt had debridement today am  She reports pain under control     Assessment & Plan:     1. Right Middle Finger tenosynovitis - s/p I and D on  - On IV vancomycin, Add Rocephin to cover for gram neg, ID consult will be requested,     2. Chronic Hep C and elevated LFT - monitor    3. H.o Substance abuse - on methadone, UDS on admission - + amphetamines,Cocaine,methadone,opiates. Code status: Full  DVT prophylaxis: heparin      Risk of deterioration: medium      Total time spent with patient: [address] discussed with: Patient/Family and Nurse  Disposition: TBD     Hospital Problems  Date Reviewed: 2019          Codes Class Noted POA    * (Principal) Cellulitis of hand ICD-10-CM: C73.300  ICD-9-CM: 682.4  2019 Yes                Review of Systems:   Pertinent items are noted in HPI. Vital Signs:    Last 24hrs VS reviewed since prior progress note. Most recent are:  Visit Vitals  /67   Pulse 69   Temp 98.7 °F (37.1 °C)   Resp 18   Ht 5' 4\" (1.626 m)   Wt 56.7 kg (125 lb)   SpO2 95%   BMI 21.46 kg/m²         Intake/Output Summary (Last 24 hours) at 2019 1447  Last data filed at 2019 0850  Gross per 24 hour   Intake 620 ml   Output 5 ml   Net 615 ml        Physical Examination:             Constitutional:  No acute distress, cooperative, pleasant    ENT:  Oral mucous moist, oropharynx benign. Neck supple,    Resp:  CTA bilaterally. No wheezing/rhonchi/rales.  No accessory muscle use   CV:  Regular rhythm, normal rate, no murmurs, gallops, rubs    GI:  Soft, non distended, non tender. normoactive bowel sounds, no hepatosplenomegaly     Musculoskeletal:  No edema, warm, 2+ pulses throughout, Right Arm is bandaged. Neurologic:  Moves all extremities. AAOx3, CN II-XII reviewed     Psych:  Good insight, Not anxious nor agitated. Data Review:    Review and/or order of clinical lab test  Review and/or order of tests in the radiology section of [facility]      Labs:     Recent Labs     02/20/19  1346 02/19/19  1113   WBC 4.4 5.7   HGB 13.1 13.8   HCT 40.2 41.6    182     Recent Labs     02/19/19  1113      K 4.0      CO2 29   BUN 13   CREA 0.74   GLU 87   CA 8.6     Recent Labs     02/19/19  1113   SGOT 173*   *   *   TBILI 0.6   TP 7.7   ALB 3.6   GLOB 4.1*     Recent Labs     02/20/19  1346   INR 1.1   PTP 10.9      No results for input(s): FE, TIBC, PSAT, FERR in the last 72 hours. No results found for: FOL, RBCF   No results for input(s): PH, PCO2, PO2 in the last 72 hours. No results for input(s): CPK, CKNDX, TROIQ in the last 72 hours.     No lab exists for component: CPKMB  No results found for: CHOL, CHOLX, CHLST, CHOLV, HDL, LDL, LDLC, DLDLP, TGLX, TRIGL, TRIGP, CHHD, CHHDX  No results found for: GLUCPOC  No results found for: COLOR, APPRN, SPGRU, REFSG, SUE, PROTU, GLUCU, KETU, BILU, UROU, YENIFER, LEUKU, GLUKE, EPSU, BACTU, WBCU, RBCU, CASTS, UCRY      Medications Reviewed:     Current Facility-Administered Medications   Medication Dose Route Frequency    cefTRIAXone (ROCEPHIN) 2 g in 0.9% sodium chloride (MBP/ADV) 50 mL  2 g IntraVENous Q24H    sodium chloride (NS) flush 5-40 mL  5-40 mL IntraVENous Q8H    sodium chloride (NS) flush 5-40 mL  5-40 mL IntraVENous PRN    methadone (DOLOPHINE) 5 mg/5 mL oral solution 55 mg  55 mg Oral DAILY    ibuprofen (MOTRIN) tablet 400 mg  400 mg Oral TID    Vancomycin Pharmacy Dosing   Other PRN    vancomycin (VANCOCIN) 1,000 mg in 0.9% sodium chloride (MBP/ADV) 250 mL  1,000 mg IntraVENous Q12H     ______________________________________________________________________  EXPECTED LENGTH OF STAY: - - -  ACTUAL LENGTH OF STAY:          0                 Negrito Vallejo MD   Patient has given Verbal permission to discuss medical care with   persons present in the room and and also with contact as listed on face sheet.

## 2019-02-20 NOTE — OP NOTES
[address]   OPERATIVE REPORT    Name:  Orville Reyes  MR#:  [id number]  :  1970  ACCOUNT #:  [de-identified]  DATE OF SERVICE:  2019    PREOPERATIVE DIAGNOSIS:  Right long finger flexor tenosynovitis. POSTOPERATIVE DIAGNOSIS:  Right long finger flexor tenosynovitis. PROCEDURE PERFORMED:  Incision and drainage of flexor tendon sheath, right long  finger. SURGEON:  Kashif Pringle MD    ASSISTANT:  none    ANESTHESIA:  See anesthesia note    COMPLICATIONS:  None. SPECIMENS REMOVED: see text. IMPLANTS:  none. ESTIMATED BLOOD LOSS: minimal.    CONDITION:  Stable. INDICATIONS FOR SURGERY:  This is a 52-year-old with the above condition. After  thorough discussion of risks, benefits, and alternatives including but not limited to  pain, loss of motion, loss of strength, tendon rupture, nerve damage, arterial  damage, vascular thrombosis causing finger loss, pain, requirement for repeat  debridement, osteomyelitis, finger loss, and persistent infection. She understood  the risks and consented to the operation. DESCRIPTION OF PROCEDURE:  The patient was taken to the operating room, placed in the  supine position on the operative table. After an appropriate time-out confirming the  above-stated procedure and laterality, the right upper extremity had 10 cc of 1%  lidocaine with epinephrine infiltrated as a digital block. Tourniquet was applied. The right upper extremity was prepped and draped in standard sterile fashion. Esmarch bandage was not used. An incision was made in a Brunner fashion over the  volar aspect of the long finger. Dissection was taken down to the flexor tendon  sheath, taking care and protecting neurovascular bundles. A large amount of pus was  seen. This was cultured. The tendon sheath was thoroughly irrigated using a  14-gauge Angiocath.   Significant debridement of necrotic material and pus was  performed and the finger was thoroughly irrigated with normal saline. Quarter-inch  Iodoform packing gauze was placed and the wound was loosely closed using 4-0 nylons. Tourniquet was let down. All fingers had excellent perfusion. An appropriate  bandage was applied. The patient was awake and in a stable condition.         Aleena Cabrales MD      [id number]/  D:  02/20/2019 8:26  T:  02/20/2019 15:33  JOB #:  [id number]

## 2019-02-20 NOTE — ROUTINE PROCESS
TRANSFER - OUT REPORT:    Verbal report given to Avis Skinner RN on Claudia Singh  being transferred to [address] [place] [address] Drive for routine post - op       Report consisted of patients Situation, Background, Assessment and   Recommendations(SBAR). Information from the following report(s) SBAR and OR Summary was reviewed with the receiving nurse. Lines:   Peripheral IV 02/19/19 Left Antecubital (Active)   Site Assessment Clean, dry, & intact 2/20/2019  8:22 AM   Phlebitis Assessment 0 2/20/2019  8:22 AM   Infiltration Assessment 0 2/20/2019  8:22 AM   Dressing Status Clean, dry, & intact 2/20/2019  8:22 AM   Dressing Type Tape;Transparent 2/20/2019  8:22 AM   Hub Color/Line Status Pink;Capped 2/20/2019  8:22 AM   Alcohol Cap Used Yes 2/20/2019  8:22 AM       Peripheral IV 02/19/19 Left Forearm (Active)   Site Assessment Clean, dry, & intact 2/20/2019  8:22 AM   Phlebitis Assessment 0 2/20/2019  8:22 AM   Infiltration Assessment 0 2/20/2019  8:22 AM   Dressing Status Clean, dry, & intact 2/20/2019  8:22 AM   Dressing Type Transparent;Tape 2/20/2019  8:22 AM   Hub Color/Line Status Blue; Infusing 2/20/2019  8:22 AM        Opportunity for questions and clarification was provided.       Patient transported with:   Registered Nurse

## 2019-02-20 NOTE — CONSULTS
[address]    Name:  Satya Krishna  MR#:  [id number]  :  1970  ACCOUNT #:  [de-identified]  DATE OF SERVICE:  2019    CONSULTING SERVICE:  Emergency Department. CONSULTED SERVICE:  Hand Surgery. REASON FOR CONSULTATION:  Right long finger pain. CHIEF COMPLAINT:  Right long finger pain. HISTORY OF PRESENT ILLNESS:  This is a 81-year-old female patient, who reports that 3  days ago she was moving and slammed her right long fingertip in a door. Since then,  she has had some pain in the right long finger, which was mild in severity until  yesterday when she noticed that it became rapidly more swollen and worsened. This  caused her significantly increased pain at the long finger. She reports that she has  had no fevers at home. Her pain now is moderate when the finger is held still in a  semiflexed position and severe with any finger motion. She has no other complaints  related to her finger. She does report a history of IV drug use and is currently on  methadone. PAST MEDICAL HISTORY:  IV drug use. PAST SURGICAL HISTORY:  Noncontributory. SOCIAL HISTORY:  Acknowledges drug use. FAMILY HISTORY:  Noncontributory. PHYSICAL EXAMINATION:  Examination of the right long finger shows finger is held in a  semiflexed position. She is very reluctant to do any active motion in the finger and  when she tried she has minimal motion. Significant pain with any passive motion and  exquisite pain with attempted extension. Erythema with a small open wound at the DIP  joint. No drainage from this wound. Tenderness along the flexor tendon sheath. Adjacent digits have no tenderness and palpable pain free motion. Sensation is  intact on radial and ulnar aspect of the long finger with capillary refill less than  2 seconds. X-rays are reviewed these show no fracture or dislocation. Soft tissue swelling  without foreign body.     PLAN:  For her flexor tenosynovitis, we would recommend admission and urgent  debridement and irrigation in the morning. She will begin IV antibiotics tonight and  will be monitored for worsening with plan for surgical decompression in the morning.         Shai Roach MD      [id number]_[facility]  D:  02/19/2019 16:30  T:  02/19/2019 21:12  JOB #:  [id number]

## 2019-02-21 LAB
ANION GAP SERPL CALC-SCNC: 6 MMOL/L (ref 5–15)
BASOPHILS # BLD: 0 K/UL (ref 0–0.1)
BASOPHILS NFR BLD: 0 % (ref 0–1)
BUN SERPL-MCNC: 12 MG/DL (ref 6–20)
BUN/CREAT SERPL: 18 (ref 12–20)
CALCIUM SERPL-MCNC: 8.8 MG/DL (ref 8.5–10.1)
CHLORIDE SERPL-SCNC: 104 MMOL/L (ref 97–108)
CO2 SERPL-SCNC: 26 MMOL/L (ref 21–32)
CREAT SERPL-MCNC: 0.68 MG/DL (ref 0.55–1.02)
DATE LAST DOSE: NORMAL
DIFFERENTIAL METHOD BLD: ABNORMAL
EOSINOPHIL # BLD: 0.1 K/UL (ref 0–0.4)
EOSINOPHIL NFR BLD: 0 % (ref 0–7)
ERYTHROCYTE [DISTWIDTH] IN BLOOD BY AUTOMATED COUNT: 13.1 % (ref 11.5–14.5)
GLUCOSE SERPL-MCNC: 120 MG/DL (ref 65–100)
HCT VFR BLD AUTO: 40.2 % (ref 35–47)
HGB BLD-MCNC: 13.1 G/DL (ref 11.5–16)
IMM GRANULOCYTES # BLD AUTO: 0 K/UL (ref 0–0.04)
IMM GRANULOCYTES NFR BLD AUTO: 0 % (ref 0–0.5)
LYMPHOCYTES # BLD: 1.5 K/UL (ref 0.8–3.5)
LYMPHOCYTES NFR BLD: 13 % (ref 12–49)
MCH RBC QN AUTO: 32 PG (ref 26–34)
MCHC RBC AUTO-ENTMCNC: 32.6 G/DL (ref 30–36.5)
MCV RBC AUTO: 98.3 FL (ref 80–99)
MONOCYTES # BLD: 0.9 K/UL (ref 0–1)
MONOCYTES NFR BLD: 8 % (ref 5–13)
NEUTS SEG # BLD: 8.7 K/UL (ref 1.8–8)
NEUTS SEG NFR BLD: 78 % (ref 32–75)
NRBC # BLD: 0 K/UL (ref 0–0.01)
NRBC BLD-RTO: 0 PER 100 WBC
PLATELET # BLD AUTO: 207 K/UL (ref 150–400)
PMV BLD AUTO: 11.1 FL (ref 8.9–12.9)
POTASSIUM SERPL-SCNC: 4.1 MMOL/L (ref 3.5–5.1)
RBC # BLD AUTO: 4.09 M/UL (ref 3.8–5.2)
REPORTED DOSE,DOSE: NORMAL UNITS
REPORTED DOSE/TIME,TMG: NORMAL
SODIUM SERPL-SCNC: 136 MMOL/L (ref 136–145)
VANCOMYCIN TROUGH SERPL-MCNC: 9.8 UG/ML (ref 5–10)
WBC # BLD AUTO: 11.1 K/UL (ref 3.6–11)

## 2019-02-21 PROCEDURE — 85025 COMPLETE CBC W/AUTO DIFF WBC: CPT

## 2019-02-21 PROCEDURE — 80048 BASIC METABOLIC PNL TOTAL CA: CPT

## 2019-02-21 PROCEDURE — 99218 HC RM OBSERVATION: CPT

## 2019-02-21 PROCEDURE — 74011250636 HC RX REV CODE- 250/636: Performed by: HOSPITALIST

## 2019-02-21 PROCEDURE — 36415 COLL VENOUS BLD VENIPUNCTURE: CPT

## 2019-02-21 PROCEDURE — 94760 N-INVAS EAR/PLS OXIMETRY 1: CPT

## 2019-02-21 PROCEDURE — 74011250637 HC RX REV CODE- 250/637: Performed by: HOSPITALIST

## 2019-02-21 PROCEDURE — 80202 ASSAY OF VANCOMYCIN: CPT

## 2019-02-21 PROCEDURE — 74011000258 HC RX REV CODE- 258: Performed by: HOSPITALIST

## 2019-02-21 RX ADMIN — IBUPROFEN 400 MG: 400 TABLET ORAL at 09:00

## 2019-02-21 RX ADMIN — Medication 10 ML: at 13:30

## 2019-02-21 RX ADMIN — METHADONE HYDROCHLORIDE 55 MG: 5 SOLUTION ORAL at 09:01

## 2019-02-21 RX ADMIN — VANCOMYCIN HYDROCHLORIDE 1000 MG: 1 INJECTION, POWDER, LYOPHILIZED, FOR SOLUTION INTRAVENOUS at 01:05

## 2019-02-21 RX ADMIN — HEPARIN SODIUM 5000 UNITS: 5000 INJECTION INTRAVENOUS; SUBCUTANEOUS at 18:23

## 2019-02-21 RX ADMIN — CEFTRIAXONE SODIUM 2 G: 2 INJECTION, POWDER, FOR SOLUTION INTRAMUSCULAR; INTRAVENOUS at 15:27

## 2019-02-21 RX ADMIN — Medication 10 ML: at 06:00

## 2019-02-21 RX ADMIN — IBUPROFEN 400 MG: 400 TABLET ORAL at 15:26

## 2019-02-21 RX ADMIN — Medication 10 ML: at 21:32

## 2019-02-21 RX ADMIN — VANCOMYCIN HYDROCHLORIDE 1000 MG: 1 INJECTION, POWDER, LYOPHILIZED, FOR SOLUTION INTRAVENOUS at 13:29

## 2019-02-21 RX ADMIN — HEPARIN SODIUM 5000 UNITS: 5000 INJECTION INTRAVENOUS; SUBCUTANEOUS at 05:54

## 2019-02-21 NOTE — PROGRESS NOTES
Hospitalist Progress Note  Radha Kuo MD  Answering service: [phone] OR [phone] from in house phone      Date of Service:  2019  NAME:  Berenice Sanon  :  1970  MRN:  [id number]      Admission Summary:   Pt admitted for Right hand pain ans swelling after hitting it      Interval history / Subjective:     Pt seen in follow up of Right Middle Finger Tenosynovitis    S/p Debridement  She reports pain under control      Assessment & Plan:     1. Right Middle Finger tenosynovitis - s/p I and D on  - On IV vancomycin, Add Rocephin to cover for gram neg, ID consult will be requested. MRSA positive in nares    2. Chronic Hep C and elevated LFT - monitor    3. H.o Substance abuse - on methadone, UDS on admission - + amphetamines,Cocaine,methadone,opiates. Code status: Full  DVT prophylaxis: heparin      Risk of deterioration: medium      Total time spent with patient: [address] discussed with: Patient/Family and Nurse  Disposition: Home w/Family and in 1-2 days     Hospital Problems  Date Reviewed: 2019          Codes Class Noted POA    * (Principal) Cellulitis of hand ICD-10-CM: L01.327  ICD-9-CM: 682.4  2019 Yes                Review of Systems:   Pertinent items are noted in HPI. Vital Signs:    Last 24hrs VS reviewed since prior progress note. Most recent are:  Visit Vitals  /83 (BP 1 Location: Left arm, BP Patient Position: At rest)   Pulse (!) 51   Temp 98.2 °F (36.8 °C)   Resp 18   Ht 5' 4\" (1.626 m)   Wt 56.7 kg (125 lb)   SpO2 96%   BMI 21.46 kg/m²         Intake/Output Summary (Last 24 hours) at 2019 1158  Last data filed at 2019 0856  Gross per 24 hour   Intake 240 ml   Output --   Net 240 ml        Physical Examination:             Constitutional:  No acute distress, cooperative, pleasant    ENT:  Oral mucous moist, oropharynx benign.  Neck supple,    Resp:  CTA bilaterally. No wheezing/rhonchi/rales. No accessory muscle use   CV:  Regular rhythm, normal rate, no murmurs, gallops, rubs    GI:  Soft, non distended, non tender. normoactive bowel sounds, no hepatosplenomegaly     Musculoskeletal:  No edema, warm, 2+ pulses throughout, Right Arm is bandaged. able to wiggle fingers     Neurologic:  Moves all extremities. AAOx3, CN II-XII reviewed     Psych:  Good insight, Not anxious nor agitated. Data Review:    Review and/or order of clinical lab test  Review and/or order of tests in the radiology section of [facility]      Labs:     Recent Labs     02/21/19  0617 02/20/19  1346   WBC 11.1* 4.4   HGB 13.1 13.1   HCT 40.2 40.2    190     Recent Labs     02/21/19  0617 02/19/19  1113    137   K 4.1 4.0    102   CO2 26 29   BUN 12 13   CREA 0.68 0.74   * 87   CA 8.8 8.6     Recent Labs     02/19/19  1113   SGOT 173*   *   *   TBILI 0.6   TP 7.7   ALB 3.6   GLOB 4.1*     Recent Labs     02/20/19  1346   INR 1.1   PTP 10.9      No results for input(s): FE, TIBC, PSAT, FERR in the last 72 hours. No results found for: FOL, RBCF   No results for input(s): PH, PCO2, PO2 in the last 72 hours. No results for input(s): CPK, CKNDX, TROIQ in the last 72 hours.     No lab exists for component: CPKMB  No results found for: CHOL, CHOLX, CHLST, CHOLV, HDL, LDL, LDLC, DLDLP, TGLX, TRIGL, TRIGP, CHHD, CHHDX  No results found for: GLUCPOC  No results found for: COLOR, APPRN, SPGRU, REFSG, SUE, PROTU, GLUCU, KETU, BILU, UROU, YENIFER, LEUKU, GLUKE, EPSU, BACTU, WBCU, RBCU, CASTS, UCRY      Medications Reviewed:     Current Facility-Administered Medications   Medication Dose Route Frequency    Vancomycin level due @ 1300 today, 2/21/19   Other ONCE    cefTRIAXone (ROCEPHIN) 2 g in 0.9% sodium chloride (MBP/ADV) 50 mL  2 g IntraVENous Q24H    heparin (porcine) injection 5,000 Units  5,000 Units SubCUTAneous Q12H    sodium chloride (NS) flush 5-40 mL  5-40 mL IntraVENous Q8H    sodium chloride (NS) flush 5-40 mL  5-40 mL IntraVENous PRN    methadone (DOLOPHINE) 5 mg/5 mL oral solution 55 mg  55 mg Oral DAILY    ibuprofen (MOTRIN) tablet 400 mg  400 mg Oral TID    Vancomycin Pharmacy Dosing   Other PRN    vancomycin (VANCOCIN) 1,000 mg in 0.9% sodium chloride (MBP/ADV) 250 mL  1,000 mg IntraVENous Q12H     ______________________________________________________________________  EXPECTED LENGTH OF STAY: - - -  ACTUAL LENGTH OF STAY:          0                 Nomi Mcneill MD   Patient has given Verbal permission to discuss medical care with   persons present in the room and and also with contact as listed on face sheet.
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
## 2019-02-21 NOTE — PROGRESS NOTES
Ortho Daily Progress Note      Patient: Caridad Brennan                   MRN: [id number]  Sex: female  YOB: 1970           Age: 53 y.o.    1 Day Post-Op    Procedure(s): INCISION AND DRAINAGE RIGHT MIDDLE FINGER    Subjective: Finger feels a little better today     Visit Vitals  /83 (BP 1 Location: Left arm, BP Patient Position: At rest)   Pulse (!) 51   Temp 98.2 °F (36.8 °C)   Resp 18   Ht 5' 4\" (1.626 m)   Wt 56.7 kg (125 lb)   SpO2 96%   BMI 21.46 kg/m²        Lab Results:  HGB   Date/Time Value Ref Range Status   02/21/2019 06:17 AM 13.1 11.5 - 16.0 g/dL Final     INR   Date/Time Value Ref Range Status   02/20/2019 01:46 PM 1.1 0.9 - 1.1   Final     Comment:     A single therapeutic range for Vit K antagonists may not be optimal for all indications - see June, 2008 issue of Chest, American College of Chest Physicians Evidence-Based Clinical Practice Guidelines, 8th Edition. Specimen Information: Finger        Component Value Flag Ref Range Units Status   Special Requests: RIGHT   MIDDLE      Preliminary   GRAM STAIN      Preliminary   OCCASIONAL WBCS SEEN    GRAM STAIN NO ORGANISMS SEEN      Preliminary   Culture result: (Abnormal)      Preliminary   Abnormal   Preliminary report of Methicillin Resistant Staphylococcus aureus by antigen detection. Confirmation and Sensitivities to follow. ... LIGHT GROWTH          Physical Exam:    GENERAL: alert, cooperative, no distress, appears stated age  DRESSING: dressing changed  WOUND: packing in place, drainage as expected, wound intact  SWELLING: mild  NEUROLOGICAL: intact      Plan:     Ice/elevation  Will remove packing tomorrow  Antibiotics as determined by culture/sensitivities      Gioia Essex, PA  2/21/2019   2:08 PM      .

## 2019-02-21 NOTE — PROGRESS NOTES
Pharmacist Note - Vancomycin Dosing  Therapy day 3  Indication: Right long finger flexor tenosynovitis; s/p I&D  Current regimen:  1000mg Q12h    A Trough Level resulted at 9.8 mcg/mL which was obtained 12 hrs post-dose and represents a true trough. Goal trough: 10 - 15 mcg/mL     Plan: Continue current regimen. Pharmacy will continue to monitor this patient daily for changes in clinical status and renal function.

## 2019-02-21 NOTE — PROGRESS NOTES
Spiritual Care Assessment/Progress Note  [facility]      NAME: Lambert Blackburn      MRN: [id number]  AGE: 53 y.o.  SEX: female  Gnosticism Affiliation: No preference   Language: English     2/21/2019     Total Time (in minutes): 10     Spiritual Assessment begun in [facility] through conversation with:         [x]Patient        [] Family    [] Friend(s)        Reason for Consult: Initial/Spiritual assessment, patient floor     Spiritual beliefs: (Please include comment if needed)     [] Identifies with a tammy tradition:         [] Supported by a tammy community:            [] Claims no spiritual orientation:           [] Seeking spiritual identity:                [x] Adheres to an individual form of spirituality:           [] Not able to assess:                           Identified resources for coping:      [x] Prayer                               [] Music                  [] Guided Imagery     [] Family/friends                 [] Pet visits     [] Devotional reading                         [] Unknown     [] Other:                                              Interventions offered during this visit: (See comments for more details)    Patient Interventions: Prayer (assurance of), Normalization of emotional/spiritual concerns, Affirmation of tammy, Affirmation of emotions/emotional suffering, Guidance concerning next steps/process to be expected           Plan of Care:     [] Support spiritual and/or cultural needs    [] Support AMD and/or advance care planning process      [] Support grieving process   [] Coordinate Rites and/or Rituals    [] Coordination with community clergy   [] No spiritual needs identified at this time   [] Detailed Plan of Care below (See Comments)  [] Make referral to Music Therapy  [] Make referral to Pet Therapy     [] Make referral to Addiction services  [] Make referral to [facility]  [] Make referral to Spiritual Care Partner  [] No future visits requested [x] Follow up visits as needed     Comments: Initial spiritual assessment in Room 608/01. Patient was sitting on the edge of the bed, eating lunch. No family present at the time of visit. Patient is being discharged tomorrow, she has some concerns about work, because she can't be out to long. Patient welcome prayer. Provided care and prayer. Advised of  Availability. Rev. Beny Cade.  Dex Carvalho Intern [facility]

## 2019-02-21 NOTE — PROGRESS NOTES
Problem: Falls - Risk of  Goal: *Absence of Falls  Document Nimco Fall Risk and appropriate interventions in the flowsheet.   Outcome: Progressing Towards Goal  Fall Risk Interventions:            Medication Interventions: Evaluate medications/consider consulting pharmacy, Patient to call before getting OOB, Teach patient to arise slowly

## 2019-02-22 PROBLEM — L08.9 INFECTION OF HAND: Status: ACTIVE | Noted: 2019-02-22

## 2019-02-22 LAB
BACTERIA SPEC CULT: ABNORMAL
BACTERIA SPEC CULT: NORMAL
GRAM STN SPEC: ABNORMAL
GRAM STN SPEC: ABNORMAL
SERVICE CMNT-IMP: ABNORMAL
SERVICE CMNT-IMP: NORMAL

## 2019-02-22 PROCEDURE — 74011250636 HC RX REV CODE- 250/636: Performed by: HOSPITALIST

## 2019-02-22 PROCEDURE — 65270000029 HC RM PRIVATE

## 2019-02-22 PROCEDURE — 74011250637 HC RX REV CODE- 250/637: Performed by: HOSPITALIST

## 2019-02-22 PROCEDURE — 99218 HC RM OBSERVATION: CPT

## 2019-02-22 RX ADMIN — Medication 10 ML: at 22:41

## 2019-02-22 RX ADMIN — VANCOMYCIN HYDROCHLORIDE 1000 MG: 1 INJECTION, POWDER, LYOPHILIZED, FOR SOLUTION INTRAVENOUS at 12:54

## 2019-02-22 RX ADMIN — Medication 10 ML: at 15:49

## 2019-02-22 RX ADMIN — Medication 10 ML: at 05:26

## 2019-02-22 RX ADMIN — METHADONE HYDROCHLORIDE 55 MG: 5 SOLUTION ORAL at 09:03

## 2019-02-22 RX ADMIN — HEPARIN SODIUM 5000 UNITS: 5000 INJECTION INTRAVENOUS; SUBCUTANEOUS at 17:04

## 2019-02-22 RX ADMIN — VANCOMYCIN HYDROCHLORIDE 1000 MG: 1 INJECTION, POWDER, LYOPHILIZED, FOR SOLUTION INTRAVENOUS at 00:43

## 2019-02-22 RX ADMIN — IBUPROFEN 400 MG: 400 TABLET ORAL at 15:52

## 2019-02-22 RX ADMIN — HEPARIN SODIUM 5000 UNITS: 5000 INJECTION INTRAVENOUS; SUBCUTANEOUS at 05:26

## 2019-02-22 RX ADMIN — Medication 10 ML: at 14:02

## 2019-02-22 RX ADMIN — IBUPROFEN 400 MG: 400 TABLET ORAL at 22:41

## 2019-02-22 RX ADMIN — IBUPROFEN 400 MG: 400 TABLET ORAL at 08:49

## 2019-02-22 NOTE — ADT AUTH CERT NOTES
02/21 - 22    Utilization Reviews        Cellulitis - Care Day 4 (2/22/2019) by Cassy Yarbrough RN        Review Status Review Entered   Completed 2/22/2019 14:35      Criteria Review      Care Day: 4 Care Date: 2/22/2019 Level of Care: Inpatient Floor   Guideline Day 3    Clinical Status   (X) * Hemodynamic stability   2/22/2019 14:35:01 EST by Carmela Bridges     98 67 123/80 17 96% ra      (X) * Fever absent or improved   2/22/2019 14:35:01 EST by Carmela Bridges     afebrile      ( ) * Skin exam stable or improved   ( ) * Mental status at baseline   ( ) * Antibiotic treatment needs appropriate for next level of care   ( ) * Pain absent or manageable at next level of care   ( ) * Discharge plans and education understood      Activity   ( ) * Ambulatory      Routes   (X) * Oral hydration, medications,[E]and diet   2/22/2019 14:35:01 EST by Carmela Bridges     cardiac diet         Medications   ( ) Parenteral or oral antibiotics[A]   2/22/2019 14:35:01 EST by Carmela Bridges     iv vanco 1000 mg q12h         2/22/2019 14:35:01 EST by Carmela Bridges   Subject: Additional Clinical Information   per attending pt admitted for right hand pain swelling after hitting it                   * Milestone         Cellulitis - Care Day 3 (2/21/2019) by Sukhjinder Dutta RN        Review Status Review Entered   Completed 2/21/2019 12:41      Criteria Review      Care Day: 3 Care Date: 2/21/2019 Level of Care: Inpatient Floor   Guideline Day 3    Level Of Care   (X) Floor to discharge[D]   2/21/2019 12:41:48 EST by Turner Lao     [facility]         Clinical Status   ( ) * Hemodynamic stability   ( ) * Fever absent or improved   ( ) * Skin exam stable or improved   ( ) * Mental status at baseline   ( ) * Antibiotic treatment needs appropriate for next level of care   ( ) * Pain absent or manageable at next level of care   ( ) * Discharge plans and education understood      Activity   ( ) * Ambulatory      Routes   ( ) * Oral hydration, medications,[E]and diet      2/21/2019 12:41:48 EST by Toby Glass   Subject: Additional Clinical Information   p Malka reports pain under control   Assessment & Plan:  Right Middle Finger tenosynovitis - s/p I and D on 2/20 - On IV vancomycin, Add Rocephin to cover for gram neg, ID consult will be requested. MRSA positive in nares     Chronic Hep C and elevated LFT - monitor     H.o Substance abuse - on methadone, UDS on admission - + amphetamines,Cocaine,methadone,opiates.          Code status: FullDVT prophylaxis: heparin    Risk of deterioration: medium Labs,   WBC 11.1, hgb 13.1, HCT 40.2, platelet 175, , K 4.1,   Resp: CTA bilaterally. No wheezing/rhonchi/rales. No accessory muscle useCV: Regular rhythm, normal rate, no murmurs, gallops, rubs GI: Soft, non distended, non tender. normoactive bowel sounds, no hepatosplenomegaly  Musculoskeletal: No edema, warm, 2+ pulses throughout, Right Arm is bandaged.  able to wiggle fingers Medicaitons,   heparin 5,000 units SC x1,   Motrin 400mg PO 3 times daily,   Mehtadone 55mg PO daily,   Vancomycin 1,000mg IV q 12 hours,                     * Milestone

## 2019-02-22 NOTE — PROGRESS NOTES
Hospitalist Progress Note  Usha Ahuja MD  Answering service: [phone] OR [phone] from in house phone      Date of Service:  2019  NAME:  Slava Rose  :  1970  MRN:  [id number]      Admission Summary:   Pt admitted for Right hand pain ans swelling after hitting it      Interval history / Subjective:   Pt seen in follow up of Right Middle Finger Tenosynovitis    S/p Debridement  She reports pain under control      Assessment & Plan:     1. Right Middle Finger tenosynovitis - s/p I and D on  - On IV vancomycin and Rocephin , ID consult will be requested. MRSA positive in nares, Prelim Cultures showing MRSA. D/C Rocephin. Treated as skin/soft tissue infection. 2. Chronic Hep C and elevated LFT - monitor    3. H.o Substance abuse - on methadone, UDS on admission - + amphetamines,Cocaine,methadone,opiates. Code status: Full  DVT prophylaxis: heparin      Risk of deterioration: medium      Total time spent with patient: [address] discussed with: Patient/Family and Nurse  Disposition: Home w/Family and in 1-2 days   ID consult placed on - consult placed again     Hospital Problems  Date Reviewed: 2019          Codes Class Noted POA    * (Principal) Cellulitis of hand ICD-10-CM: A67.690  ICD-9-CM: 682.4  2019 Yes                Review of Systems:   Pertinent items are noted in HPI. Vital Signs:    Last 24hrs VS reviewed since prior progress note.  Most recent are:  Visit Vitals  /65 (BP 1 Location: Right arm, BP Patient Position: At rest)   Pulse 60   Temp 98 °F (36.7 °C)   Resp 18   Ht 5' 4\" (1.626 m)   Wt 56.7 kg (125 lb)   SpO2 95%   BMI 21.46 kg/m²         Intake/Output Summary (Last 24 hours) at 2019 0800  Last data filed at 2019 1329  Gross per 24 hour   Intake 480 ml   Output --   Net 480 ml        Physical Examination:             Constitutional:  No acute distress, cooperative, pleasant    ENT:  Oral mucous moist, oropharynx benign. Neck supple,    Resp:  CTA bilaterally. No wheezing/rhonchi/rales. No accessory muscle use   CV:  Regular rhythm, normal rate, no murmurs, gallops, rubs    GI:  Soft, non distended, non tender. normoactive bowel sounds, no hepatosplenomegaly     Musculoskeletal:  No edema, warm, 2+ pulses throughout, Right Arm is bandaged. able to wiggle fingers     Neurologic:  Moves all extremities. AAOx3, CN II-XII reviewed     Psych:  Good insight, Not anxious nor agitated. Data Review:    Review and/or order of clinical lab test  Review and/or order of tests in the radiology section of CPT      Labs:     Recent Labs     02/21/19  0617 02/20/19  1346   WBC 11.1* 4.4   HGB 13.1 13.1   HCT 40.2 40.2    190     Recent Labs     02/21/19  0617 02/19/19  1113    137   K 4.1 4.0    102   CO2 26 29   BUN 12 13   CREA 0.68 0.74   * 87   CA 8.8 8.6     Recent Labs     02/19/19  1113   SGOT 173*   *   *   TBILI 0.6   TP 7.7   ALB 3.6   GLOB 4.1*     Recent Labs     02/20/19  1346   INR 1.1   PTP 10.9      No results for input(s): FE, TIBC, PSAT, FERR in the last 72 hours. No results found for: FOL, RBCF   No results for input(s): PH, PCO2, PO2 in the last 72 hours. No results for input(s): CPK, CKNDX, TROIQ in the last 72 hours.     No lab exists for component: CPKMB  No results found for: CHOL, CHOLX, CHLST, CHOLV, HDL, LDL, LDLC, DLDLP, TGLX, TRIGL, TRIGP, CHHD, CHHDX  No results found for: GLUCPOC  No results found for: COLOR, APPRN, SPGRU, REFSG, SUE, PROTU, GLUCU, KETU, BILU, UROU, YENIFER, LEUKU, GLUKE, EPSU, BACTU, WBCU, RBCU, CASTS, UCRY      Medications Reviewed:     Current Facility-Administered Medications   Medication Dose Route Frequency    cefTRIAXone (ROCEPHIN) 2 g in 0.9% sodium chloride (MBP/ADV) 50 mL  2 g IntraVENous Q24H    heparin (porcine) injection 5,000 Units  5,000 Units SubCUTAneous Q12H  sodium chloride (NS) flush 5-40 mL  5-40 mL IntraVENous Q8H    sodium chloride (NS) flush 5-40 mL  5-40 mL IntraVENous PRN    methadone (DOLOPHINE) 5 mg/5 mL oral solution 55 mg  55 mg Oral DAILY    ibuprofen (MOTRIN) tablet 400 mg  400 mg Oral TID    Vancomycin Pharmacy Dosing   Other PRN    vancomycin (VANCOCIN) 1,000 mg in 0.9% sodium chloride (MBP/ADV) 250 mL  1,000 mg IntraVENous Q12H     ______________________________________________________________________  EXPECTED LENGTH OF STAY: - - -  ACTUAL LENGTH OF STAY:          0                 Khadra Leggett MD   Patient has given Verbal permission to discuss medical care with   persons present in the room and and also with contact as listed on face sheet.

## 2019-02-22 NOTE — PHYSICIAN ADVISORY
Letter of Status Determination:   Recommend hospitalization status upgraded from   OBSERVATION  to INPATIENT  Status     Pt Name:  Caridad Brennan   MR#   [address] [facility] # [id number] /  [id number]  Payor: Ab Cast / Plan: [employer] [employer] / Product Type: Managed Care Medicaid /    STEVO#  [id number]   Room and Hospital  608/01  @ [facility]   Hospitalization date  2/19/2019 10:48 AM   Current Attending Physician  Kelsi Ha MD   Principal diagnosis  Cellulitis of hand [Q81.368]     Clinicals  53 y.o. y.o  female hospitalized with above diagnosis   The pt went through urgent surgery of the infected finger and tendon. Wound culture is growing MRSA   She is receiving broad and specific spectrum abx      Milliman (MCG) criteria   Does   apply Cellulitis    STATUS DETERMINATION  Based on documented presenting clinical data, comorbid conditions, high risk of adverse events and deterioration, it is our recommendation that the patient's status should be upgraded from OBSERVATION to INPATIENT status. The final decision of the patient's hospitalization status depends on the attending physician's judgment. Additional comments     Payor: Ab Cast / Plan: [employer] / Product Type: Managed Care Medicaid /         Christiano Ingram MD MPH FACP   Cell: [phone]  Physician [address]   [facility] [employer], [address] [place] [address]    Cell  [phone]        [phone]    .

## 2019-02-22 NOTE — PROGRESS NOTES
Ortho:  Right hand has mild discomfort but feeling better. T 98.1, HR 54, 128/75, sat 98% RA. Right hand dressing removed. Incision well approximated with mild maceration. She can actively move her finger better than pre-op. CR brisk. SILT. Labs:  OR wound culture MRSA. WBC 11.1 2/21    Imp:  1. I removed packing and redressed her. 2. abx , ID consult  3. D/W Dr. Facundo Witt who prefers smaller dressing allowing for AROM/PROM with OT. 4. Case management OT 2x/week. 5. F/U in office 1 week.     TIM Crump

## 2019-02-23 PROCEDURE — 74011250636 HC RX REV CODE- 250/636: Performed by: INTERNAL MEDICINE

## 2019-02-23 PROCEDURE — 74011250637 HC RX REV CODE- 250/637: Performed by: HOSPITALIST

## 2019-02-23 PROCEDURE — 74011250636 HC RX REV CODE- 250/636: Performed by: HOSPITALIST

## 2019-02-23 PROCEDURE — 65270000029 HC RM PRIVATE

## 2019-02-23 RX ADMIN — METHADONE HYDROCHLORIDE 55 MG: 5 SOLUTION ORAL at 09:02

## 2019-02-23 RX ADMIN — VANCOMYCIN HYDROCHLORIDE 1000 MG: 1 INJECTION, POWDER, LYOPHILIZED, FOR SOLUTION INTRAVENOUS at 00:52

## 2019-02-23 RX ADMIN — IBUPROFEN 400 MG: 400 TABLET ORAL at 09:02

## 2019-02-23 RX ADMIN — IBUPROFEN 400 MG: 400 TABLET ORAL at 21:29

## 2019-02-23 RX ADMIN — IBUPROFEN 400 MG: 400 TABLET ORAL at 16:26

## 2019-02-23 RX ADMIN — VANCOMYCIN HYDROCHLORIDE 1000 MG: 1 INJECTION, POWDER, LYOPHILIZED, FOR SOLUTION INTRAVENOUS at 13:39

## 2019-02-23 RX ADMIN — Medication 10 ML: at 21:29

## 2019-02-23 RX ADMIN — Medication 10 ML: at 13:39

## 2019-02-23 NOTE — PROGRESS NOTES
Infectious Diseases Consultation     Please see dictated note     Impression      1. MRSA septic flexor tenosynovitis of right middle finger    2. IVDU -- on methadone    3. Chronic Hep C      Recommend:      1. Continue Vancomycin    2.  Not a candidate for home IV antibiotics      Will discuss; will check her again on Monday     Thanks,   Jeancarlos Friend MD  2/23/2019  2:37 AM

## 2019-02-23 NOTE — CONSULTS
[address]    Name:  Justin Dahl  MR#:  [id number]  :  1970  ACCOUNT #:  [de-identified]  DATE OF SERVICE:  2019    The patient was seen in room 608. ATTENDING PHYSICIAN:  Myrna Caldwell MD    CHIEF COMPLAINT:  Swelling and pain of the right middle finger. REASON FOR CONSULTATION:  MRSA septic, flexor tenosynovitis of the right middle finger. The consultation was requested by Dr. Akash Sheikh. The history was obtained by interview of the patient, review of the medical records. HISTORY OF PRESENT ILLNESS:  This patient is a 72-year-old white female with a history of chronic hepatitis C infection and a history of active intravenous drug use. She is on methadone. The patient states that she slammed her right middle finger in a door perhaps on about 2019. She does not recall any bleeding or break at the skin. Two or three days later, she noticed pustule on the palmar aspect of the right middle finger about the level of the DIP joint. She developed progressively worsening swelling and pain and she really had difficulty bending or straightening the finger. On 2019, she presented to the emergency room. She was noted to have signs of a significant infection. The patient was seen by Dr. Cyndi Castle. On 2019, the patient underwent exploration. An incision was made on the volar aspect of the right middle finger and \"a large amount of pus\" was seen. The tendon sheath was thoroughly irrigated. There was necrotic material and pus involving the tendon sheath. She was diagnosed with septic flexor tenosynovitis. Cultures revealed MRSA. The patient was initially treated with vancomycin and Rocephin. She is now on vancomycin alone. We are asked to see her for further evaluation. PAST MEDICAL HISTORY:  1. Tobacco, half pack per day. 2.  Alcohol, occasional social use.     MEDICATIONS PRIOR TO ADMISSION: Methadone. ALLERGIES:  NONE KNOWN. ILLNESSES:  Chronic hepatitis C infection - the patient states that she was treated for chronic hepatitis C infection, maybe 10 years ago with [place] [facility] [place] SURGERIES:  1. Right breast lumpectomy - benign. 2.  . SOCIAL HISTORY:  The patient has 4 children (twins and 2 other children). She works as a . She continues to use IV heroin and usually injects in her legs. FAMILY HISTORY:  Otherwise unremarkable. REVIEW OF SYSTEMS:  CONSTITUTIONAL:  No fever, chills, sweats. HEENT:  No headache or visual change and no sore throat. LYMPHATIC:  No history of adenopathy. DERMATOLOGIC:  No rashes. RESPIRATORY:  No cough or hemoptysis. CARDIOVASCULAR:  No chest pain or history of heart murmurs. GASTROINTESTINAL:  No nausea, vomiting, diarrhea. GENITOURINARY:  No dysuria. MUSCULOSKELETAL:  As in the HPI. NEUROLOGIC:  No history of seizure or stroke. PHYSICAL EXAMINATION:  GENERAL:  In no acute distress. VITAL SIGNS:  Blood pressure is 114/73, heart rate 56, respiratory rate 18. She is afebrile. HEENT:  Sclerae and conjunctivae benign, oropharynx benign. NECK:   Supple. NODES:  No adenopathy. SKIN:  No rashes. LUNGS:  Clear. CARDIOVASCULAR:  Regular rate and rhythm without murmurs. ABDOMEN:  Soft, nondistended, nontender, no masses or HSM, bowel sounds are present. BACK:  No CVA tenderness. EXTREMITIES:  Right hand, there is a dressing over the right middle finger. Lower extremities, no cellulitis. NEUROLOGIC:  Alert and oriented. LABORATORY DATA:  CBC reveals a hemoglobin of 13.1 and white count 11,100, and platelets 099,021. Chemistry data reveals BUN of 12 and creatinine of 0.68. On admission, bilirubin was normal.  SGOT 173, SGPT 257, and alkaline phosphatase 133. MICROBIOLOGY DATA:  Blood cultures on admission were negative.   Culture of the right finger from surgery reveals light growth of MRSA with a vancomycin JOEY of 2, daptomycin JOEY 1, linezolid JOEY 2, and the organisms also susceptible to tetracycline and Bactrim, but resistive to clindamycin. IMPRESSION:  1. Methicillin-resistant Staphylococcus aureus septic flexor tenosynovitis of the right middle finger - status post surgical debridement on 02/20/2019:  The patient has undergone surgical debridement. There was significant purulent material and necrotic tissue. I think she should continue intravenous antibiotic therapy. I do not think this patient is a candidate for home intravenous antibiotic therapy. Treatment through the [facility] would also be difficult. I would not be comfortable sending her home with a central line. I do not think the infusion setting will be able to obtain peripheral access for daily daptomycin. Linezolid also is somewhat problematic as it can interact with methadone with a potential risk for serotonin release syndrome. At this time, I think infection is too extensive to treat with Bactrim or doxycycline alone. At this time, I would leave her on vancomycin. I have explained this to her. 2.  History of chronic hepatitis C infection and currently elevated transaminases - the patient states that she was cured for hepatitis C infection by \"1 week of treatment\" at least about 10 years ago. I suspect she still has chronic hepatitis C infection. I will send a hepatitis C RNA PCR. I will also check to see whether or not she is immune to hepatitis A and hepatitis B. If not, she should receive the vaccine series. 3.  Intravenous drug use. PLAN:  Continue vancomycin and wound care. Thank you very much for allowing us to see this patient with you.         Liliana Andrade MD      JOSE F/NELIDA_MARAN_I/  D:  02/23/2019 2:53  T:  02/23/2019 4:50  JOB #:  [id number]  CC:  MD Efrain Colunga MD Ronold Parish, MD

## 2019-02-23 NOTE — PROGRESS NOTES
Hospitalist Progress Note  Tamiko Frazier MD  Answering service: [phone] OR [phone] from in house phone      Date of Service:  2019  NAME:  Ruslan Dutta  :  1970  MRN:  [id number]      Admission Summary:   Pt admitted for Right hand pain ans swelling after hitting it      Interval history / Subjective:   Pt seen in follow up of Right Middle Finger Tenosynovitis    S/p Debridement  She reports pain under control   She is in understanding of plan     Assessment & Plan:     1. Right Middle Finger tenosynovitis - s/p I and D on  - On IV vancomycin and Rocephin , ID consult will be requested. MRSA positive in nares, Prelim Cultures showing MRSA. D/C Rocephin. Treated as skin/soft tissue infection. Appreciate ID consult - c/w IV vancomycin. Risk of interaction of bactrim with methadone (which patient is on)     2. Chronic Hep C and elevated LFT - monitor, Hepatitis panel sent    3. H.o Substance abuse - on methadone as op  UDS on admission - + amphetamines,Cocaine,methadone,opiates. Code status: Full  DVT prophylaxis: heparin      Risk of deterioration: medium      Total time spent with patient: [address] discussed with: Patient/Family and Nurse  Disposition: TBD once finished IV abx      Hospital Problems  Date Reviewed: 2019          Codes Class Noted POA    Infection of hand ICD-10-CM: L08.9  ICD-9-CM: 686.9  2019 Unknown        * (Principal) Cellulitis of hand ICD-10-CM: B15.474  ICD-9-CM: 682.4  2019 Yes                Review of Systems:   Pertinent items are noted in HPI. Vital Signs:    Last 24hrs VS reviewed since prior progress note.  Most recent are:  Visit Vitals  /67 (BP 1 Location: Right arm, BP Patient Position: At rest)   Pulse (!) 56   Temp 98.6 °F (37 °C)   Resp 18   Ht 5' 4\" (1.626 m)   Wt 56.7 kg (125 lb)   SpO2 96%   BMI 21.46 kg/m²       No intake or output data in the 24 hours ending 02/23/19 0836     Physical Examination:             Constitutional:  No acute distress, cooperative, pleasant    ENT:  Oral mucous moist, oropharynx benign. Neck supple,    Resp:  CTA bilaterally. No wheezing/rhonchi/rales. No accessory muscle use   CV:  Regular rhythm, normal rate, no murmurs, gallops, rubs    GI:  Soft, non distended, non tender. normoactive bowel sounds, no hepatosplenomegaly     Musculoskeletal:  No edema, warm, 2+ pulses throughout, Right Arm is bandaged. able to wiggle fingers     Neurologic:  Moves all extremities. AAOx3, CN II-XII reviewed     Psych:  Good insight, Not anxious nor agitated. Data Review:    Review and/or order of clinical lab test  Review and/or order of tests in the radiology section of CPT      Labs:     Recent Labs     02/21/19  0617 02/20/19  1346   WBC 11.1* 4.4   HGB 13.1 13.1   HCT 40.2 40.2    190     Recent Labs     02/21/19  0617      K 4.1      CO2 26   BUN 12   CREA 0.68   *   CA 8.8     No results for input(s): SGOT, GPT, ALT, AP, TBIL, TBILI, TP, ALB, GLOB, GGT, AML, LPSE in the last 72 hours. No lab exists for component: AMYP, HLPSE  Recent Labs     02/20/19  1346   INR 1.1   PTP 10.9      No results for input(s): FE, TIBC, PSAT, FERR in the last 72 hours. No results found for: FOL, RBCF   No results for input(s): PH, PCO2, PO2 in the last 72 hours. No results for input(s): CPK, CKNDX, TROIQ in the last 72 hours.     No lab exists for component: CPKMB  No results found for: CHOL, CHOLX, CHLST, CHOLV, HDL, LDL, LDLC, DLDLP, TGLX, TRIGL, TRIGP, CHHD, CHHDX  No results found for: GLUCPOC  No results found for: COLOR, APPRN, SPGRU, REFSG, SUE, PROTU, GLUCU, KETU, BILU, UROU, YENIFER, LEUKU, GLUKE, EPSU, BACTU, WBCU, RBCU, CASTS, UCRY      Medications Reviewed:     Current Facility-Administered Medications   Medication Dose Route Frequency    heparin (porcine) injection 5,000 Units  5,000 Units SubCUTAneous Q12H    sodium chloride (NS) flush 5-40 mL  5-40 mL IntraVENous Q8H    sodium chloride (NS) flush 5-40 mL  5-40 mL IntraVENous PRN    methadone (DOLOPHINE) 5 mg/5 mL oral solution 55 mg  55 mg Oral DAILY    ibuprofen (MOTRIN) tablet 400 mg  400 mg Oral TID    Vancomycin Pharmacy Dosing   Other PRN    vancomycin (VANCOCIN) 1,000 mg in 0.9% sodium chloride (MBP/ADV) 250 mL  1,000 mg IntraVENous Q12H     ______________________________________________________________________  EXPECTED LENGTH OF STAY: - - -  ACTUAL LENGTH OF STAY:          1                 Jefry Longo MD   Patient has given Verbal permission to discuss medical care with   persons present in the room and and also with contact as listed on face sheet.

## 2019-02-23 NOTE — PROGRESS NOTES
Ortho:  Right hand has less pain. She is frustrated by decision to remain inpatient for IV abx.     T 98.6, HR 56, 110/67, sat 96% RA. Right hand dressing removed. Incision well approximated with mild maceration. She can actively move her finger better than pre-op but still limited flexion and extension. CR brisk. SILT.     Labs:  OR wound culture MRSA.     Imp:  1. Daily wound care minimizing bulk to allow ROM. 2. ID input appreciated. I had candid discussion with patient regarding reluctance to send her out with PICC given h/o drug abuse. She is understanding. 3. OT aggressive AROM/PROM and assistance with dressings.     TIM Villalba

## 2019-02-24 LAB
ANION GAP SERPL CALC-SCNC: 7 MMOL/L (ref 5–15)
BACTERIA SPEC CULT: NORMAL
BUN SERPL-MCNC: 14 MG/DL (ref 6–20)
BUN/CREAT SERPL: 38 (ref 12–20)
CALCIUM SERPL-MCNC: 5.8 MG/DL (ref 8.5–10.1)
CHLORIDE SERPL-SCNC: 118 MMOL/L (ref 97–108)
CO2 SERPL-SCNC: 21 MMOL/L (ref 21–32)
CREAT SERPL-MCNC: 0.37 MG/DL (ref 0.55–1.02)
GLUCOSE BLD STRIP.AUTO-MCNC: 130 MG/DL (ref 65–100)
GLUCOSE SERPL-MCNC: 73 MG/DL (ref 65–100)
MAGNESIUM SERPL-MCNC: 1.4 MG/DL (ref 1.6–2.4)
POTASSIUM SERPL-SCNC: 2.9 MMOL/L (ref 3.5–5.1)
SERVICE CMNT-IMP: ABNORMAL
SERVICE CMNT-IMP: NORMAL
SODIUM SERPL-SCNC: 146 MMOL/L (ref 136–145)

## 2019-02-24 PROCEDURE — 83735 ASSAY OF MAGNESIUM: CPT

## 2019-02-24 PROCEDURE — 74011250637 HC RX REV CODE- 250/637: Performed by: INTERNAL MEDICINE

## 2019-02-24 PROCEDURE — 65270000029 HC RM PRIVATE

## 2019-02-24 PROCEDURE — 74011250637 HC RX REV CODE- 250/637: Performed by: HOSPITALIST

## 2019-02-24 PROCEDURE — 80048 BASIC METABOLIC PNL TOTAL CA: CPT

## 2019-02-24 PROCEDURE — 82962 GLUCOSE BLOOD TEST: CPT

## 2019-02-24 PROCEDURE — 74011250636 HC RX REV CODE- 250/636: Performed by: HOSPITALIST

## 2019-02-24 PROCEDURE — 74011250636 HC RX REV CODE- 250/636: Performed by: INTERNAL MEDICINE

## 2019-02-24 PROCEDURE — 74011000258 HC RX REV CODE- 258: Performed by: INTERNAL MEDICINE

## 2019-02-24 PROCEDURE — 36415 COLL VENOUS BLD VENIPUNCTURE: CPT

## 2019-02-24 RX ORDER — MAGNESIUM SULFATE 1 G/100ML
1 INJECTION INTRAVENOUS ONCE
Status: COMPLETED | OUTPATIENT
Start: 2019-02-24 | End: 2019-02-24

## 2019-02-24 RX ORDER — POTASSIUM CHLORIDE 750 MG/1
40 TABLET, FILM COATED, EXTENDED RELEASE ORAL EVERY 4 HOURS
Status: COMPLETED | OUTPATIENT
Start: 2019-02-24 | End: 2019-02-24

## 2019-02-24 RX ORDER — POTASSIUM CHLORIDE 750 MG/1
40 TABLET, FILM COATED, EXTENDED RELEASE ORAL
Status: COMPLETED | OUTPATIENT
Start: 2019-02-24 | End: 2019-02-24

## 2019-02-24 RX ADMIN — Medication 10 ML: at 23:26

## 2019-02-24 RX ADMIN — IBUPROFEN 400 MG: 400 TABLET ORAL at 08:45

## 2019-02-24 RX ADMIN — POTASSIUM CHLORIDE 40 MEQ: 750 TABLET, FILM COATED, EXTENDED RELEASE ORAL at 12:08

## 2019-02-24 RX ADMIN — Medication 10 ML: at 14:14

## 2019-02-24 RX ADMIN — POTASSIUM CHLORIDE 40 MEQ: 750 TABLET, EXTENDED RELEASE ORAL at 06:10

## 2019-02-24 RX ADMIN — MAGNESIUM SULFATE HEPTAHYDRATE 1 G: 1 INJECTION, SOLUTION INTRAVENOUS at 11:18

## 2019-02-24 RX ADMIN — HEPARIN SODIUM 5000 UNITS: 5000 INJECTION INTRAVENOUS; SUBCUTANEOUS at 06:10

## 2019-02-24 RX ADMIN — METHADONE HYDROCHLORIDE 55 MG: 5 SOLUTION ORAL at 08:45

## 2019-02-24 RX ADMIN — CALCIUM GLUCONATE 3 G: 98 INJECTION, SOLUTION INTRAVENOUS at 07:39

## 2019-02-24 RX ADMIN — Medication 10 ML: at 06:10

## 2019-02-24 RX ADMIN — VANCOMYCIN HYDROCHLORIDE 1000 MG: 1 INJECTION, POWDER, LYOPHILIZED, FOR SOLUTION INTRAVENOUS at 00:45

## 2019-02-24 RX ADMIN — IBUPROFEN 400 MG: 400 TABLET ORAL at 23:26

## 2019-02-24 RX ADMIN — IBUPROFEN 400 MG: 400 TABLET ORAL at 16:23

## 2019-02-24 RX ADMIN — VANCOMYCIN HYDROCHLORIDE 1000 MG: 1 INJECTION, POWDER, LYOPHILIZED, FOR SOLUTION INTRAVENOUS at 12:08

## 2019-02-24 NOTE — PROGRESS NOTES
Reason for Admission:  1. Right Middle Finger tenosynovitis                     RRAT Score:   6                  Plan for utilizing home health:      None as patient is not home bound                    Likelihood of Readmission:  low                         Transition of Care Plan:   Patient is a 53 yr old  female admitted for R middle finger infection after injury from a car door. Patient is currently on IV vancomycin and has a past history of IV drug use. The current plan is for patient to remain in hospital while receiving the IV ABX treatment. Upon interview patient stated she was told she need to be on ABX for 3 weeks but will be switched over to oral med's prior so she can go home. Patient is fully independent. When verifying demographics patient advised she had moved just prior to hospitalization and will need to get her new address so CC can be updated. Patient does not have a PCP but acknowledged she should get one and requested help. Have emailed the specialist for assistance in getting patient a new PCP appointment. Received consult that patient will need OP OT. This requires a script at discharge for OT that includes diagnosis. Patient can use script to go to any OP therapy clinic of her choice. Care Management Interventions  PCP Verified by CM: No(Needs PCP)  Discharge Durable Medical Equipment: No  Physical Therapy Consult: No  Occupational Therapy Consult: No  Speech Therapy Consult: No  Current Support Network: Own Home  Plan discussed with Pt/Family/Caregiver:  Yes

## 2019-02-24 NOTE — PROGRESS NOTES
Bedside shift change report given to Lizet Gregg (oncoming nurse) by Neelima Snider (offgoing nurse). Report included the following information SBAR, Kardex, Intake/Output and Recent Results.

## 2019-02-24 NOTE — PROGRESS NOTES
Ortho:  Feeling better. Ibuprofen for pain. No OT thus far. T 98.3, HR 55, /73  Right hand dressing removed. Incision has less maceration. Overall less erythema and less swelling. Able to obtain full active extension middle finger but flexion still limited. CR brisk. Some reduced sens ulnar side middle finger, radial side SILT. Cultures MRSA. GFR > 60. Imp:  POD #4 I&D right hand middle finger flexor tenosynovitis    Plan:  1. daily dressing change minimizing bulk. 2. Aggressive AROM/PROM fingers. Patient understands importance of working on her own. 3. OT.  4. abx per ID.  5.  Disp planning.     TIM Pritchard

## 2019-02-24 NOTE — PROGRESS NOTES
Hospitalist Progress Note  Phoebe José MD  Answering service: [phone] OR [phone] from in house phone      Date of Service:  2019  NAME:  Everton Hwang  :  1970  MRN:  [id number]      Admission Summary:   Pt admitted for Right hand pain ans swelling after hitting it      Interval history / Subjective:   Pt seen in follow up of Right Middle Finger Tenosynovitis    S/p Debridement  She reports pain under control   She is in understanding of plan  Denies Rash     Assessment & Plan:     1. Right Middle Finger tenosynovitis - s/p I and D on  - On IV vancomycin and Rocephin , ID consult will be requested. MRSA positive in nares, Prelim Cultures showing MRSA. D/C Rocephin. Treated as skin/soft tissue infection. Appreciate ID consult - c/w IV vancomycin. Risk of interaction of bactrim with methadone (which patient is on)     2. Chronic Hep C and elevated LFT - monitor, Hepatitis panel sent    3. Hypokalemia and hypomag - Replete     4. H.o Substance abuse - on methadone as op  UDS on admission - + amphetamines,Cocaine,methadone,opiates. Code status: Full  DVT prophylaxis: heparin      Risk of deterioration: medium      Total time spent with patient: [facility] discussed with: Patient/Family and Nurse  Disposition: TBD once finished IV abx      Hospital Problems  Date Reviewed: 2019          Codes Class Noted POA    Infection of hand ICD-10-CM: L08.9  ICD-9-CM: 686.9  2019 Unknown        * (Principal) Cellulitis of hand ICD-10-CM: Y71.917  ICD-9-CM: 682.4  2019 Yes                Review of Systems:   Pertinent items are noted in HPI. Vital Signs:    Last 24hrs VS reviewed since prior progress note.  Most recent are:  Visit Vitals  /73 (BP 1 Location: Left arm, BP Patient Position: Standing)   Pulse (!) 55   Temp 98.3 °F (36.8 °C)   Resp 18   Ht 5' 4\" (1.626 m)   Wt 56.7 kg (125 lb) SpO2 94%   BMI 21.46 kg/m²         Intake/Output Summary (Last 24 hours) at 2/24/2019 1025  Last data filed at 2/24/2019 4813  Gross per 24 hour   Intake 120 ml   Output --   Net 120 ml        Physical Examination:             Constitutional:  No acute distress, cooperative, pleasant    ENT:  Oral mucous moist, oropharynx benign. Neck supple,    Resp:  CTA bilaterally. No wheezing/rhonchi/rales. No accessory muscle use   CV:  Regular rhythm, normal rate, no murmurs, gallops, rubs    GI:  Soft, non distended, non tender. normoactive bowel sounds, no hepatosplenomegaly     Musculoskeletal:  No edema, warm, 2+ pulses throughout, Right Arm is bandaged. able to wiggle fingers,Non Cellulitic arm proximally      Neurologic:  Moves all extremities. AAOx3, CN II-XII reviewed     Psych:  Good insight, Not anxious nor agitated. Data Review:    Review and/or order of clinical lab test  Review and/or order of tests in the radiology section of CPT      Labs:     No results for input(s): WBC, HGB, HCT, PLT, HGBEXT, HCTEXT, PLTEXT, HGBEXT, HCTEXT, PLTEXT in the last 72 hours. Recent Labs     02/24/19  0545 02/24/19  0232   NA  --  146*   K  --  2.9*   CL  --  118*   CO2  --  21   BUN  --  14   CREA  --  0.37*   GLU  --  73   CA  --  5.8*   MG 1.4*  --      No results for input(s): SGOT, GPT, ALT, AP, TBIL, TBILI, TP, ALB, GLOB, GGT, AML, LPSE in the last 72 hours. No lab exists for component: AMYP, HLPSE  No results for input(s): INR, PTP, APTT in the last 72 hours. No lab exists for component: INREXT, INREXT   No results for input(s): FE, TIBC, PSAT, FERR in the last 72 hours. No results found for: FOL, RBCF   No results for input(s): PH, PCO2, PO2 in the last 72 hours. No results for input(s): CPK, CKNDX, TROIQ in the last 72 hours.     No lab exists for component: CPKMB  No results found for: CHOL, CHOLX, CHLST, CHOLV, HDL, LDL, LDLC, DLDLP, TGLX, TRIGL, TRIGP, CHHD, CHHDX  Lab Results   Component Value Date/Time    Glucose (POC) 130 (H) 02/24/2019 09:31 AM     No results found for: COLOR, APPRN, SPGRU, REFSG, SUE, PROTU, GLUCU, KETU, BILU, UROU, YENIFER, LEUKU, GLUKE, EPSU, BACTU, WBCU, RBCU, CASTS, UCRY      Medications Reviewed:     Current Facility-Administered Medications   Medication Dose Route Frequency    potassium chloride SR (KLOR-CON 10) tablet 40 mEq  40 mEq Oral Q4H    calcium gluconate 3 g in 0.9% sodium chloride 100 mL IVPB  3 g IntraVENous ONCE    magnesium sulfate 1 g/100 ml IVPB (premix or compounded)  1 g IntraVENous ONCE    vancomycin (VANCOCIN) 1,000 mg in 0.9% sodium chloride (MBP/ADV) 250 mL  1,000 mg IntraVENous Q12H    heparin (porcine) injection 5,000 Units  5,000 Units SubCUTAneous Q12H    sodium chloride (NS) flush 5-40 mL  5-40 mL IntraVENous Q8H    sodium chloride (NS) flush 5-40 mL  5-40 mL IntraVENous PRN    methadone (DOLOPHINE) 5 mg/5 mL oral solution 55 mg  55 mg Oral DAILY    ibuprofen (MOTRIN) tablet 400 mg  400 mg Oral TID    Vancomycin Pharmacy Dosing   Other PRN     ______________________________________________________________________  EXPECTED LENGTH OF STAY: - - -  ACTUAL LENGTH OF STAY:          2                 Tyree Marcos MD   Patient has given Verbal permission to discuss medical care with   persons present in the room and and also with contact as listed on face sheet.

## 2019-02-25 LAB
ANION GAP SERPL CALC-SCNC: 11 MMOL/L (ref 5–15)
BASOPHILS # BLD: 0 K/UL (ref 0–0.1)
BASOPHILS NFR BLD: 1 % (ref 0–1)
BUN SERPL-MCNC: 19 MG/DL (ref 6–20)
BUN/CREAT SERPL: 23 (ref 12–20)
CALCIUM SERPL-MCNC: 9.2 MG/DL (ref 8.5–10.1)
CHLORIDE SERPL-SCNC: 102 MMOL/L (ref 97–108)
CO2 SERPL-SCNC: 22 MMOL/L (ref 21–32)
CREAT SERPL-MCNC: 0.81 MG/DL (ref 0.55–1.02)
DIFFERENTIAL METHOD BLD: NORMAL
EOSINOPHIL # BLD: 0.2 K/UL (ref 0–0.4)
EOSINOPHIL NFR BLD: 4 % (ref 0–7)
ERYTHROCYTE [DISTWIDTH] IN BLOOD BY AUTOMATED COUNT: 13.2 % (ref 11.5–14.5)
GLUCOSE SERPL-MCNC: 101 MG/DL (ref 65–100)
HCT VFR BLD AUTO: 45.3 % (ref 35–47)
HGB BLD-MCNC: 14.9 G/DL (ref 11.5–16)
IMM GRANULOCYTES # BLD AUTO: 0 K/UL (ref 0–0.04)
IMM GRANULOCYTES NFR BLD AUTO: 0 % (ref 0–0.5)
LYMPHOCYTES # BLD: 1.4 K/UL (ref 0.8–3.5)
LYMPHOCYTES NFR BLD: 25 % (ref 12–49)
MAGNESIUM SERPL-MCNC: 2.1 MG/DL (ref 1.6–2.4)
MCH RBC QN AUTO: 31.2 PG (ref 26–34)
MCHC RBC AUTO-ENTMCNC: 32.9 G/DL (ref 30–36.5)
MCV RBC AUTO: 94.8 FL (ref 80–99)
MONOCYTES # BLD: 0.7 K/UL (ref 0–1)
MONOCYTES NFR BLD: 13 % (ref 5–13)
NEUTS SEG # BLD: 3.1 K/UL (ref 1.8–8)
NEUTS SEG NFR BLD: 57 % (ref 32–75)
NRBC # BLD: 0 K/UL (ref 0–0.01)
NRBC BLD-RTO: 0 PER 100 WBC
PLATELET # BLD AUTO: 263 K/UL (ref 150–400)
PMV BLD AUTO: 10.2 FL (ref 8.9–12.9)
POTASSIUM SERPL-SCNC: 5.1 MMOL/L (ref 3.5–5.1)
RBC # BLD AUTO: 4.78 M/UL (ref 3.8–5.2)
SODIUM SERPL-SCNC: 135 MMOL/L (ref 136–145)
WBC # BLD AUTO: 5.5 K/UL (ref 3.6–11)

## 2019-02-25 PROCEDURE — 97165 OT EVAL LOW COMPLEX 30 MIN: CPT

## 2019-02-25 PROCEDURE — 36415 COLL VENOUS BLD VENIPUNCTURE: CPT

## 2019-02-25 PROCEDURE — 97110 THERAPEUTIC EXERCISES: CPT

## 2019-02-25 PROCEDURE — 74011250636 HC RX REV CODE- 250/636: Performed by: INTERNAL MEDICINE

## 2019-02-25 PROCEDURE — 83735 ASSAY OF MAGNESIUM: CPT

## 2019-02-25 PROCEDURE — 80048 BASIC METABOLIC PNL TOTAL CA: CPT

## 2019-02-25 PROCEDURE — 65270000029 HC RM PRIVATE

## 2019-02-25 PROCEDURE — 74011250637 HC RX REV CODE- 250/637: Performed by: HOSPITALIST

## 2019-02-25 PROCEDURE — 85025 COMPLETE CBC W/AUTO DIFF WBC: CPT

## 2019-02-25 PROCEDURE — 36569 INSJ PICC 5 YR+ W/O IMAGING: CPT | Performed by: HOSPITALIST

## 2019-02-25 RX ADMIN — VANCOMYCIN HYDROCHLORIDE 1000 MG: 1 INJECTION, POWDER, LYOPHILIZED, FOR SOLUTION INTRAVENOUS at 01:42

## 2019-02-25 RX ADMIN — IBUPROFEN 400 MG: 400 TABLET ORAL at 10:22

## 2019-02-25 RX ADMIN — METHADONE HYDROCHLORIDE 55 MG: 5 SOLUTION ORAL at 10:22

## 2019-02-25 RX ADMIN — Medication 10 ML: at 14:28

## 2019-02-25 RX ADMIN — IBUPROFEN 400 MG: 400 TABLET ORAL at 22:23

## 2019-02-25 RX ADMIN — IBUPROFEN 400 MG: 400 TABLET ORAL at 18:50

## 2019-02-25 NOTE — PROGRESS NOTES
[id number] Notified Dr. Alayna Farfan that the pt's IV is infiltrated. Notified MD that RNs have been unable to gain IV access. MD stated he would consider a PICC or Midline but to keep trying to place a peripheral IV in the mean time.

## 2019-02-25 NOTE — PROGRESS NOTES
Hospitalist Progress Note  Dany Limon MD  Answering service: [phone] OR [phone] from in house phone      Date of Service:  2019  NAME:  Jarrell Esposito  :  1970  MRN:  [id number]      Admission Summary:   Pt admitted for Right hand pain ans swelling after hitting it      Interval history / Subjective:   Pt seen in follow up of Right Middle Finger Tenosynovitis    S/p Debridement  She reports pain under control   She is in understanding of plan  Denies Rash  In understanding with the plan for Inpatient stay     Assessment & Plan:     1. Right Middle Finger tenosynovitis - s/p I and D on  - On IV vancomycin and Rocephin , ID consult will be requested. MRSA positive in nares, Prelim Cultures showing MRSA. D/C Rocephin. Treated as skin/soft tissue infection. Appreciate ID consult - c/w IV vancomycin. Risk of interaction of bactrim with methadone (which patient is on)     2. Chronic Hep C and elevated LFT - monitor, Hepatitis panel sent    3. Hypokalemia and hypomag - Repleted, recheck in am     4. H.o Opioid Dependence  - on methadone as op  UDS on admission - + amphetamines,Cocaine,methadone,opiates. Code status: Full  DVT prophylaxis: heparin    Risk of deterioration: medium      Total time spent with patient: [facility] discussed with: Patient/Family and Nurse  Disposition: TBD once finished IV abx      Hospital Problems  Date Reviewed: 2019          Codes Class Noted POA    Infection of hand ICD-10-CM: L08.9  ICD-9-CM: 686.9  2019 Unknown        * (Principal) Cellulitis of hand ICD-10-CM: E92.816  ICD-9-CM: 682.4  2019 Yes                Review of Systems:   Pertinent items are noted in HPI. Vital Signs:    Last 24hrs VS reviewed since prior progress note.  Most recent are:  Visit Vitals  /71 (BP 1 Location: Left arm, BP Patient Position: At rest)   Pulse 62   Temp 97.5 °F (36.4 °C)   Resp 18   Ht 5' 4\" (1.626 m)   Wt 56.7 kg (125 lb)   SpO2 96%   BMI 21.46 kg/m²         Intake/Output Summary (Last 24 hours) at 2/25/2019 1619  Last data filed at 2/24/2019 2326  Gross per 24 hour   Intake 480 ml   Output --   Net 480 ml        Physical Examination:             Constitutional:  No acute distress, cooperative, pleasant    ENT:  Oral mucous moist, oropharynx benign. Neck supple,    Resp:  CTA bilaterally. No wheezing/rhonchi/rales. No accessory muscle use   CV:  Regular rhythm, normal rate, no murmurs, gallops, rubs    GI:  Soft, non distended, non tender. normoactive bowel sounds, no hepatosplenomegaly     Musculoskeletal:  No edema, warm, 2+ pulses throughout, Right Arm is bandaged. able to wiggle fingers,Non Cellulitic arm proximally      Neurologic:  Moves all extremities. AAOx3, CN II-XII reviewed     Psych:  Good insight, Not anxious nor agitated. Data Review:    Review and/or order of clinical lab test  Review and/or order of tests in the radiology section of [facility]      Labs:     Recent Labs     02/25/19  1416   WBC 5.5   HGB 14.9   HCT 45.3        Recent Labs     02/25/19  0337 02/24/19  0545 02/24/19  0232   *  --  146*   K 5.1  --  2.9*     --  118*   CO2 22  --  21   BUN 19  --  14   CREA 0.81  --  0.37*   *  --  73   CA 9.2  --  5.8*   MG 2.1 1.4*  --      No results for input(s): SGOT, GPT, ALT, AP, TBIL, TBILI, TP, ALB, GLOB, GGT, AML, LPSE in the last 72 hours. No lab exists for component: AMYP, HLPSE  No results for input(s): INR, PTP, APTT in the last 72 hours. No lab exists for component: INREXT, INREXT   No results for input(s): FE, TIBC, PSAT, FERR in the last 72 hours. No results found for: FOL, RBCF   No results for input(s): PH, PCO2, PO2 in the last 72 hours. No results for input(s): CPK, CKNDX, TROIQ in the last 72 hours.     No lab exists for component: CPKMB  No results found for: CHOL, CHOLX, CHLST, CHOLV, HDL, LDL, LDLC, DLDLP, Lance Bailey, [facility], [facility]  Lab Results   Component Value Date/Time    Glucose (POC) 130 (H) 02/24/2019 09:31 AM     No results found for: COLOR, APPRN, SPGRU, REFSG, SUE, PROTU, GLUCU, KETU, BILU, UROU, YENIFER, LEUKU, GLUKE, EPSU, BACTU, WBCU, RBCU, CASTS, UCRY      Medications Reviewed:     Current Facility-Administered Medications   Medication Dose Route Frequency    vancomycin (VANCOCIN) 1,000 mg in 0.9% sodium chloride (MBP/ADV) 250 mL  1,000 mg IntraVENous Q12H    heparin (porcine) injection 5,000 Units  5,000 Units SubCUTAneous Q12H    sodium chloride (NS) flush 5-40 mL  5-40 mL IntraVENous Q8H    sodium chloride (NS) flush 5-40 mL  5-40 mL IntraVENous PRN    methadone (DOLOPHINE) 5 mg/5 mL oral solution 55 mg  55 mg Oral DAILY    ibuprofen (MOTRIN) tablet 400 mg  400 mg Oral TID    Vancomycin Pharmacy Dosing   Other PRN     ______________________________________________________________________  EXPECTED LENGTH OF STAY: 2d 7h  ACTUAL LENGTH OF STAY:          3                 Lisa Flowers MD   Patient has given Verbal permission to discuss medical care with   persons present in the room and and also with contact as listed on face sheet.

## 2019-02-25 NOTE — PROGRESS NOTES
Problem: Self Care Deficits Care Plan (Adult)  Goal: *Acute Goals and Plan of Care (Insert Text)  1. Patient will perform R hand/ finger exercises independently within 7 days. 2. Patient will achieve full AROM of R hand 3rd digit with all joints in all planes within 7 days. Occupational Therapy EVALUATION  Patient: Arnulfo Tsang (29 y.o. female)  Date: 2/25/2019  Primary Diagnosis: Cellulitis of hand [G68.207]  Infection of hand [L08.9]  Procedure(s) (LRB):  INCISION AND DRAINAGE RIGHT MIDDLE FINGER (Right) 5 Days Post-Op   Precautions: none       ASSESSMENT :  Based on the objective data described below, the patient presents with Independent upper body ADLs, Independent lower body ADLs, and Independent assist functional mobility s/p admission for R hand 3rd digit flexor tenosynovitis. Noted ortho recommending aggressive AROM/ PROM. Therapist instructed patient in R hand composite hand and individual finger exercises in all planes (see below for details). Unable to formally measure ROM of 3rd digit due to finger dressing (therapist not cleared by RN to remove). Patient appears to achieve full extension of affected finger and flexion to ~2cm short of palm with AROM, improving to ~1cm assisting with L hand for SROM. Patient reports only intermittent pain (6/10) with exercises and no pain at conclusion of session. The following are barriers to independence while in acute care:   - Cognitive and/or behavioral: pain management, anxiety during exercises  - Medical condition: ROM    Patient will benefit from skilled acute intervention to address the above impairments. Patients rehabilitation potential is considered to be Good    Discharge recommendations: Outpatient hand therapy  Barriers to discharging home, in addition to above listed impairments: none.        PLAN :  Recommendations and Planned Interventions: therapeutic exercise, therapeutic activities and patient education    Frequency/Duration: Patient will be followed by occupational therapy 3 times a week to address goals. SUBJECTIVE:   Patient stated It's getting better.     OBJECTIVE DATA SUMMARY:   HISTORY:   Past Medical History:   Diagnosis Date    Hepatitis C infection     Smoker      Past Surgical History:   Procedure Laterality Date    BREAST SURGERY PROCEDURE UNLISTED      lumpectomy    HX GYN             Prior Level of Function/Environment/Context: independent with ADLs/ IADLs, living alone, ambulatory with no AD  Expanded or extensive additional review of patient history:     Home Situation  Home Environment: Apartment(on second floor of building, elevator or stair access)  # Steps to Enter: 3  Rails to Enter: Yes  One/Two Story Residence: One story  Living Alone: Yes  Support Systems: Child(kady)  Patient Expects to be Discharged to[de-identified] Apartment  Current DME Used/Available at Home: None  Tub or Shower Type: Tub/Shower combination    Hand dominance: Right    EXAMINATION OF PERFORMANCE DEFICITS:  Cognitive/Behavioral Status:  Neurologic State: Alert  Orientation Level: Oriented X4  Cognition: Follows commands; Appropriate for age attention/concentration; Appropriate decision making; Appropriate safety awareness  Perception: Appears intact  Perseveration: No perseveration noted  Safety/Judgement: Awareness of environment; Insight into deficits    Skin: visible skin appears intact, R hand/ finger dressing c/d/i    Edema: none observed    Hearing: Auditory  Auditory Impairment: None  Hearing Aids/Status: Does not own    Vision/Perceptual:                           Acuity: Within Defined Limits;Able to read clock/calendar on wall without difficulty; Able to read employee name badge without difficulty         Range of Motion:  BUE WFL  Unable to formally measure ROM of 3rd digit due to finger dressing (therapist not cleared by RN to remove).   Patient appears to achieve full extension of affected finger and flexion ~2cm short of palm with AROM, improving to ~1cm assisting with L hand for SROM. All other fingers WFL                              Strength:  R hand decreased due to pain/ surgical site, otherwise WFL                   Coordination:     Fine Motor Skills-Upper: Left Intact; Right Impaired    Gross Motor Skills-Upper: Left Intact; Right Intact    Tone & Sensation:  Tone: normal  Sensation: reports reduced ulnar side R hand 3rd digit                                Balance:  Sitting: Intact  Standing: Intact    Functional Mobility and Transfers for ADLs:  Bed Mobility:  Rolling: Independent  Supine to Sit: Independent  Sit to Supine: Independent    Transfers:  Sit to Stand: Independent  Stand to Sit: Independent  Toilet Transfer : Independent    ADL Assessment:  Feeding: Independent; Additional time(reports difficulty cutting with R hand but able)    Oral Facial Hygiene/Grooming: Independent(inferred)    Bathing: Independent(inferred)    Upper Body Dressing: Independent(inferred)    Lower Body Dressing: Independent(donned both socks seated EOB)    Toileting: Independent(performed bladder hygiene and clothing management)                ADL Intervention and task modifications:             Cognitive Retraining  Safety/Judgement: Awareness of environment; Insight into deficits    Therapeutic Exercise:  Patient instructed in the following R hand exercises to promote AROM and functional use.   Some difficulty with R hand 3rd digit flexion, patient instructed to assist with L hand for SROM    -10 reps composite hand flexion and extension  -10 reps individual finger PIP flexion with DIP extension, PIP flexion with DIP flexion, then full extension (all fingers individually)  -10 reps composite finger PIP flexion with DIP extension, PIP flexion with DIP flexion, then full extension (all fingers together)  -10 reps finger abduction/ adduction    Functional Measure:  Barthel Index:    Bathin  Bladder: 10  Bowels: 10  Groomin  Dressing: 10  Feeding: 10  Mobility: 15  Stairs: 10  Toilet Use: 10  Transfer (Bed to Chair and Back): 15  Total: 100        Indicating 0% impairment in basic ADL ability. The Barthel ADL Index: Guidelines  1. The index should be used as a record of what a patient does, not as a record of what a patient could do. 2. The main aim is to establish degree of independence from any help, physical or verbal, however minor and for whatever reason. 3. The need for supervision renders the patient not independent. 4. A patient's performance should be established using the best available evidence. Asking the patient, friends/relatives and nurses are the usual sources, but direct observation and common sense are also important. However direct testing is not needed. 5. Usually the patient's performance over the preceding 24-48 hours is important, but occasionally longer periods will be relevant. 6. Middle categories imply that the patient supplies over 50 per cent of the effort. 7. Use of aids to be independent is allowed. Mitch Santo., Barthel, D.W. (5141). Functional evaluation: the Barthel Index. [address] [facility] (14)2. HUNTER Aquino, Jesus Diana., Shanna Damon., [place], [address] [facility] (1999). Measuring the change indisability after inpatient rehabilitation; comparison of the responsiveness of the Barthel Index and Functional [place] Measure. Journal of Neurology, Neurosurgery, and Psychiatry, 66(4), 107-164. Geno Kwok, N.[place].A, SHAREE Dela Cruz, & Mesfin Ramon, MLindseyA. (2004.) Assessment of post-stroke quality of life in cost-effectiveness studies: The usefulness of the Barthel Index and the EuroQoL-5D.  Quality of Life Research, 15, 926-77         Occupational Therapy Evaluation Charge Determination   History Examination Decision-Making   LOW Complexity : Brief history review  LOW Complexity : 1-3 performance deficits relating to physical, cognitive , or psychosocial skils that result in activity limitations and / or participation restrictions  LOW Complexity : No comorbidities that affect functional and no verbal or physical assistance needed to complete eval tasks       Based on the above components, the patient evaluation is determined to be of the following complexity level: LOW   Pain:  Pre treatment: 0 /10   During treatment: 6/10 (intermittent)  Post treatment:  0/10   Location: R middle finger    Description: jolt  Aggravating factors: exercises    Activity Tolerance:   Good  Please refer to the flowsheet for vital signs taken during this treatment. After treatment patient left:   Supine in bed   COMMUNICATION/EDUCATION:   The patients plan of care was discussed with: Registered Nurse. Home safety education was provided and the patient/caregiver indicated understanding., Patient/family have participated as able in goal setting and plan of care. and Patient/family agree to work toward stated goals and plan of care. This patients plan of care is appropriate for delegation to [facility].     Thank you for this referral.  Jordon Staley OT  Time Calculation: 32 mins

## 2019-02-25 NOTE — PROGRESS NOTES
ORTHO PROGRESS NOTE    2019  Admit Date:   2019    Post Op day: 5 Days Post-Op    Subjective:    Rhina Núñez states that finger is feeling better with improved ROM. Some increased pain at this visit as she has just had OT treatment. Denies new fevers/chills. Remains on Vanc per ID team.  Tolerating diet  Denies N/V/SOB or CP    PT/OT:   Gait:                    Vital Signs:    Patient Vitals for the past 8 hrs:   BP Temp Pulse Resp SpO2   19 0802 91/60 97.3 °F (36.3 °C) 62 18 95 %     Temp (24hrs), Av.8 °F (36.6 °C), Min:97.3 °F (36.3 °C), Max:98.3 °F (36.8 °C)      Pain Control:   Pain Assessment  Pain Scale 1: Numeric (0 - 10)  Pain Intensity 1: 0  Pain Location 1: Finger (comment which one)  Pain Orientation 1: Right  Pain Description 1: Throbbing  Pain Intervention(s) 1: Other (comment)(denies pain and/or pain management needs)    Meds:    Current Facility-Administered Medications   Medication Dose Route Frequency    Vancomycin level due @ 1300 today, 19   Other ONCE    vancomycin (VANCOCIN) 1,000 mg in 0.9% sodium chloride (MBP/ADV) 250 mL  1,000 mg IntraVENous Q12H    heparin (porcine) injection 5,000 Units  5,000 Units SubCUTAneous Q12H    sodium chloride (NS) flush 5-40 mL  5-40 mL IntraVENous Q8H    sodium chloride (NS) flush 5-40 mL  5-40 mL IntraVENous PRN    methadone (DOLOPHINE) 5 mg/5 mL oral solution 55 mg  55 mg Oral DAILY    ibuprofen (MOTRIN) tablet 400 mg  400 mg Oral TID    Vancomycin Pharmacy Dosing   Other PRN       LAB:    No results for input(s): HCT, HGB, INR, HCTEXT, HGBEXT in the last 72 hours.     No lab exists for component: INREXT    Transfuse PRBC's:      Assessment & Physician's Comment:  Dressing is clean, dry, and intact  Neurovascular checks within normal limits  Orientation:  Oriented    Principal Problem:    Cellulitis of hand (2019)    Active Problems:    Infection of hand (2019)        Plan:    Dressing changed - reinforce as necessary  Encouraged continued hand exercises for range  OT following  Abx per ID team      Len Yeager PA-C   Orthopedic Trauma Service  [address] [facility]

## 2019-02-25 NOTE — CDMP QUERY
Patient admitted with tenosynovitis, noted to have h/o substance abuse. . If possible, please document in progress notes and d/c summary if you are evaluating and/or treating any of the following: 
  
=> Opioid Dependence  
=> Opioid Abuse 
=> Opioid Use 
=> Other explanation of clinical findings 
=> Clinically Undetermined (no explanation for clinical findings) The medical record reflects the following: 
   Risk Factors: h/o substance abuse, on methadone Clinical Indicators: ED Note- Pt also reports history of IV heroin use, most recently 3 days ago. She states she goes to a Methadone clinic daily. H&P-Urine is positive for amphetamine, cocaine, methadone, and opiates. History  Of substance abuse continue Methadone. Treatment: UDS, cont methadone Please clarify and document your clinical opinion in the progress notes and discharge summary including the definitive and/or presumptive diagnosis, (suspected or probable), related to the above clinical findings. Please include clinical findings supporting your diagnosis. Thank you, Karolyn Hinkle RN 
[facility] 
[phone]

## 2019-02-26 PROCEDURE — 77030018719 HC DRSG PTCH ANTIMIC J&J -A

## 2019-02-26 PROCEDURE — 76937 US GUIDE VASCULAR ACCESS: CPT

## 2019-02-26 PROCEDURE — C1751 CATH, INF, PER/CENT/MIDLINE: HCPCS

## 2019-02-26 PROCEDURE — 74011250637 HC RX REV CODE- 250/637: Performed by: HOSPITALIST

## 2019-02-26 PROCEDURE — 65270000029 HC RM PRIVATE

## 2019-02-26 PROCEDURE — 97110 THERAPEUTIC EXERCISES: CPT

## 2019-02-26 PROCEDURE — 77030020365 HC SOL INJ SOD CL 0.9% 50ML

## 2019-02-26 PROCEDURE — 02HV33Z INSERTION OF INFUSION DEVICE INTO SUPERIOR VENA CAVA, PERCUTANEOUS APPROACH: ICD-10-PCS | Performed by: INTERNAL MEDICINE

## 2019-02-26 PROCEDURE — 74011250636 HC RX REV CODE- 250/636: Performed by: INTERNAL MEDICINE

## 2019-02-26 PROCEDURE — 77030020847 HC STATLOK BARD -A

## 2019-02-26 RX ORDER — SODIUM CHLORIDE 0.9 % (FLUSH) 0.9 %
10 SYRINGE (ML) INJECTION AS NEEDED
Status: DISCONTINUED | OUTPATIENT
Start: 2019-02-26 | End: 2019-03-06 | Stop reason: HOSPADM

## 2019-02-26 RX ORDER — DIPHENHYDRAMINE HCL 25 MG
25 CAPSULE ORAL
Status: DISCONTINUED | OUTPATIENT
Start: 2019-02-26 | End: 2019-03-06 | Stop reason: HOSPADM

## 2019-02-26 RX ADMIN — Medication 10 ML: at 05:28

## 2019-02-26 RX ADMIN — Medication 10 ML: at 21:21

## 2019-02-26 RX ADMIN — VANCOMYCIN HYDROCHLORIDE 1000 MG: 1 INJECTION, POWDER, LYOPHILIZED, FOR SOLUTION INTRAVENOUS at 18:33

## 2019-02-26 RX ADMIN — DIPHENHYDRAMINE HYDROCHLORIDE 25 MG: 25 CAPSULE ORAL at 10:49

## 2019-02-26 RX ADMIN — VANCOMYCIN HYDROCHLORIDE 1000 MG: 1 INJECTION, POWDER, LYOPHILIZED, FOR SOLUTION INTRAVENOUS at 05:27

## 2019-02-26 RX ADMIN — IBUPROFEN 400 MG: 400 TABLET ORAL at 16:41

## 2019-02-26 RX ADMIN — IBUPROFEN 400 MG: 400 TABLET ORAL at 21:20

## 2019-02-26 RX ADMIN — IBUPROFEN 400 MG: 400 TABLET ORAL at 10:49

## 2019-02-26 RX ADMIN — METHADONE HYDROCHLORIDE 55 MG: 5 SOLUTION ORAL at 10:49

## 2019-02-26 NOTE — PROGRESS NOTES
Problem: Self Care Deficits Care Plan (Adult)  Goal: *Acute Goals and Plan of Care (Insert Text)  1. Patient will perform R hand/ finger exercises independently within 7 days. 2. Patient will achieve full AROM of R hand 3rd digit with all joints in all planes within 7 days. Occupational Therapy TREATMENT  Patient: Chikis Mathis (20 y.o. female)  Date: 2/26/2019  Diagnosis: Cellulitis of hand [I50.900]  Infection of hand [L08.9] Cellulitis of hand  Procedure(s) (LRB):  INCISION AND DRAINAGE RIGHT MIDDLE FINGER (Right) 6 Days Post-Op  Precautions:    Chart, occupational therapy assessment, plan of care, and goals were reviewed. ASSESSMENT:  Patient received in bed with right hand bandaged. Patient demonstrates understanding and performance of right hand exercises issued on 2/25/19. Instructed on 6 pack hand exercises with demonstration and handout and added these to her exercise program. Patient demonstrated understanding and performed 8 to 10 reps of each. Nurse and patient report MD is changing dressing daily. Unable to measure finger flexion on right secondary to dressing. Patient progressing well. Progression toward goals:  [x]       Improving appropriately and progressing toward goals  []       Improving slowly and progressing toward goals  []       Not making progress toward goals and plan of care will be adjusted     PLAN:  Patient continues to benefit from skilled intervention to address the above impairments. Continue treatment per established plan of care. Discharge Recommendations:  Outpatient Hand Therapy  Further Equipment Recommendations for Discharge:  none     SUBJECTIVE:   Patient stated It's stiff. I haven't been up that long.     OBJECTIVE DATA SUMMARY:   Cognitive/Behavioral Status:  Neurologic State: Alert  Orientation Level: Oriented X4  Cognition: Appropriate decision making; Appropriate for age attention/concentration; Appropriate safety awareness; Follows commands  Perception: Appears intact  Perseveration: No perseveration noted  Safety/Judgement: Awareness of environment    Functional Mobility and Transfers for ADLs:    Cognitive Retraining  Safety/Judgement: Awareness of environment       Therapeutic Exercises:   Patient instructed in the following R hand exercises to promote AROM and functional use. Some difficulty with R hand 3rd digit flexion, patient instructed to assist with L hand for SROM     -10 reps composite hand flexion and extension  -10 reps individual finger PIP flexion with DIP extension, PIP flexion with DIP flexion, then full extension (all fingers individually)  -10 reps composite finger PIP flexion with DIP extension, PIP flexion with DIP flexion, then full extension (all fingers together)  -10 reps finger abduction/ adduction  - 8  Reps MP isolated flexion/extension  - 8 reps of individual 2 point pinch to thumb with each finger        Activity Tolerance:   Good  Please refer to the flowsheet for vital signs taken during this treatment.   After treatment:   [] Patient left in no apparent distress sitting up in chair  [x] Patient left in no apparent distress in bed  [x] Call bell left within reach  [x] Nursing notified  [] Caregiver present  [] Bed alarm activated    COMMUNICATION/COLLABORATION:   The patients plan of care was discussed with: Registered Nurse    TRIP Alanis  Time Calculation: 12 mins

## 2019-02-26 NOTE — PROGRESS NOTES
Hospitalist Progress Note  Jacy Devine MD  Answering service: [phone] OR [phone] from in house phone      Date of Service:  2019  NAME:  Hali Norman  :  1970  MRN:  [id number]      Admission Summary:   Pt admitted for Right hand pain ans swelling after hitting it      Interval history / Subjective:   Pt seen in follow up of Right Middle Finger Tenosynovitis    S/p Debridement  She reports pain under control     In understanding with the plan for Inpatient stay     Assessment & Plan:     1. Right Middle Finger tenosynovitis - s/p I and D on  - On IV vancomycin and Rocephin , ID consult will be requested. MRSA positive in nares, Prelim Cultures showing MRSA. D/C Rocephin. Treated as skin/soft tissue infection. Appreciate ID consult - c/w IV vancomycin. Risk of interaction of bactrim with methadone (which patient is on)     2. Chronic Hep C and elevated LFT - monitor, Hepatitis panel sent    3. Hypokalemia and hypomag - Repleted, recheck in am     4. H.o Opioid Dependence  - on methadone as op  UDS on admission - + amphetamines,Cocaine,methadone,opiates. Code status: Full  DVT prophylaxis: heparin    Risk of deterioration: medium      Total time spent with patient: [address] [facility] discussed with: Patient/Family and Nurse  Disposition: TBD once finished IV abx      Hospital Problems  Date Reviewed: 2019          Codes Class Noted POA    Infection of hand ICD-10-CM: L08.9  ICD-9-CM: 686.9  2019 Unknown        * (Principal) Cellulitis of hand ICD-10-CM: K81.056  ICD-9-CM: 682.4  2019 Yes                Review of Systems:   Pertinent items are noted in HPI. Vital Signs:    Last 24hrs VS reviewed since prior progress note.  Most recent are:  Visit Vitals  /58 (BP 1 Location: Left arm, BP Patient Position: At rest)   Pulse 74   Temp 97.7 °F (36.5 °C)   Resp 18   Ht 5' 4\" (1.626 m)   Wt 56.7 kg (125 lb)   SpO2 98%   BMI 21.46 kg/m²       No intake or output data in the 24 hours ending 02/26/19 0950     Physical Examination:             Constitutional:  No acute distress, cooperative, pleasant    ENT:  Oral mucous moist, oropharynx benign. Neck supple,    Resp:  CTA bilaterally. No wheezing/rhonchi/rales. No accessory muscle use   CV:  Regular rhythm, normal rate, no murmurs, gallops, rubs    GI:  Soft, non distended, non tender. normoactive bowel sounds, no hepatosplenomegaly     Musculoskeletal:  No edema, warm, 2+ pulses throughout, Right Arm is bandaged. able to wiggle fingers,Non Cellulitic arm proximally      Neurologic:  Moves all extremities. AAOx3, CN II-XII reviewed     Psych:  Good insight, Not anxious nor agitated. Data Review:    Review and/or order of clinical lab test  Review and/or order of tests in the radiology section of [facility]      Labs:     Recent Labs     02/25/19  1416   WBC 5.5   HGB 14.9   HCT 45.3        Recent Labs     02/25/19  0337 02/24/19  0545 02/24/19  0232   *  --  146*   K 5.1  --  2.9*     --  118*   CO2 22  --  21   BUN 19  --  14   CREA 0.81  --  0.37*   *  --  73   CA 9.2  --  5.8*   MG 2.1 1.4*  --      No results for input(s): SGOT, GPT, ALT, AP, TBIL, TBILI, TP, ALB, GLOB, GGT, AML, LPSE in the last 72 hours. No lab exists for component: AMYP, HLPSE  No results for input(s): INR, PTP, APTT in the last 72 hours. No lab exists for component: INREXT, INREXT   No results for input(s): FE, TIBC, PSAT, FERR in the last 72 hours. No results found for: FOL, RBCF   No results for input(s): PH, PCO2, PO2 in the last 72 hours. No results for input(s): CPK, CKNDX, TROIQ in the last 72 hours.     No lab exists for component: CPKMB  No results found for: CHOL, CHOLX, CHLST, CHOLV, HDL, LDL, LDLC, DLDLP, TGLX, TRIGL, TRIGP, CHHD, CHHDX  Lab Results   Component Value Date/Time    Glucose (POC) 130 (H) 02/24/2019 09:31 AM     No results found for: COLOR, APPRN, SPGRU, REFSG, SUE, PROTU, GLUCU, KETU, BILU, UROU, YENIFER, LEUKU, GLUKE, EPSU, BACTU, WBCU, RBCU, CASTS, UCRY      Medications Reviewed:     Current Facility-Administered Medications   Medication Dose Route Frequency    [START ON 2/27/2019] Vancomycin level due @ 0600 on 2/27/19   Other ONCE    diphenhydrAMINE (BENADRYL) capsule 25 mg  25 mg Oral Q6H PRN    vancomycin (VANCOCIN) 1,000 mg in 0.9% sodium chloride (MBP/ADV) 250 mL  1,000 mg IntraVENous Q12H    heparin (porcine) injection 5,000 Units  5,000 Units SubCUTAneous Q12H    sodium chloride (NS) flush 5-40 mL  5-40 mL IntraVENous Q8H    sodium chloride (NS) flush 5-40 mL  5-40 mL IntraVENous PRN    methadone (DOLOPHINE) 5 mg/5 mL oral solution 55 mg  55 mg Oral DAILY    ibuprofen (MOTRIN) tablet 400 mg  400 mg Oral TID    Vancomycin Pharmacy Dosing   Other PRN     ______________________________________________________________________  EXPECTED LENGTH OF STAY: 2d 7h  ACTUAL LENGTH OF STAY:          4                 Eric Alonzo MD   Patient has given Verbal permission to discuss medical care with   persons present in the room and and also with contact as listed on face sheet.

## 2019-02-26 NOTE — PROGRESS NOTES
NUTRITION  Pt seen for:       []           Supplements  []             PO intake check   []           Food Allergies  []             Food Preferences/tolerances    [x]           Rescreen   []             Education    []           Diet order clarification []             Other            RECOMMENDATIONS:   Regular, No Added Salt    SUBJECTIVE/OBJECTIVE:   Chart reviewed, discussed with RN and team during interdisciplinary rounds. Pt eating well without issues. No need for full cardiac diet at this time. Will liberalize to Regular, No Added Salt. No nutrition risk identified. Will rescreen as indicated.     Diet: Cardiac Regular    Intake: [x]           Good     []           Fair      []           Poor   Patient Vitals for the past 100 hrs:   % Diet Eaten   02/25/19 1029 75 %   02/24/19 1701 90 %   02/24/19 0842 100 %   02/23/19 1000 80 %     Weight Changes:   Last 3 Recorded Weights in this Encounter    02/19/19 1039 02/20/19 0659   Weight: 56.7 kg (125 lb) 56.7 kg (125 lb)     Nutrition Problems Identified:  [x]      None    PLAN:   [x]           Rescreen per screening protocol  [x]           Adjust diet order to Regular,  No Added Salt    Rescreen:  []            At Nutrition Risk           [x]            Not at Nutrition Risk, rescreen per screening protocol    [address], [address]

## 2019-02-26 NOTE — PROGRESS NOTES
Spoke with Dr. Josefina López with anesthesiology regarding the placement of a central line. MD stated that they would call when they are available to place the line. MD stated they would try a peripheral line first and if that fails, they would try a central line. MD stated to call [phone] to speak with the nurse.

## 2019-02-26 NOTE — PROGRESS NOTES
Ortho Daily Progress Note      Patient: Peterson Friend                   MRN: [id number]  Sex: female  YOB: 1970           Age: 53 y.o.    6 Days Post-Op    Procedure(s): INCISION AND DRAINAGE RIGHT MIDDLE FINGER    Subjective: Feels better, working on mobility with therapy     Visit Vitals  BP 92/64 (BP 1 Location: Left arm, BP Patient Position: At rest)   Pulse 74   Temp 98.1 °F (36.7 °C)   Resp 18   Ht 5' 4\" (1.626 m)   Wt 56.7 kg (125 lb)   SpO2 95%   BMI 21.46 kg/m²        Lab Results:  HGB   Date/Time Value Ref Range Status   02/25/2019 02:16 PM 14.9 11.5 - 16.0 g/dL Final     INR   Date/Time Value Ref Range Status   02/20/2019 01:46 PM 1.1 0.9 - 1.1   Final     Comment:     A single therapeutic range for Vit K antagonists may not be optimal for all indications - see June, 2008 issue of Chest, American College [employer] Evidence-Based Clinical Practice Guidelines, 8th Edition.        Physical Exam:    DRESSING: dressing changed  SWELLING: mild  NEUROLOGICAL: intact  PULSE:yes   GENERAL: alert, cooperative, no distress, appears stated age  WOUND: No Drainage and Wound Intact  MOTION: right long finger ROM improving    Plan:    Incision healing well  ROM as tolerated  ABX per ID  Dressing changes daily prn    TIM Bustillo  2/26/2019   2:56 PM      .

## 2019-02-26 NOTE — PROGRESS NOTES
Infectious Diseases Progress Note    Antibiotic Summary:  Zosyn  2/19 x 1 dose  Vancomycin  -- present  Rocephin  --     Surgical I&D right middle finger on 2019: POD # 5    Subjective:     Pain is better -- beginning PT with finger    Objective:     Vitals:   Visit Vitals  /72 (BP 1 Location: Left arm, BP Patient Position: At rest)   Pulse 67   Temp 98.3 °F (36.8 °C)   Resp 18   Ht 5' 4\" (1.626 m)   Wt 56.7 kg (125 lb)   LMP 2018 (Within Months)   SpO2 98%   BMI 21.46 kg/m²        Tmax:  Temp (24hrs), Av.9 °F (36.6 °C), Min:97.3 °F (36.3 °C), Max:98.3 °F (36.8 °C)      Exam:  General appearance: alert, no distress  Lungs: clear to auscultation bilaterally  Heart: regular rate and rhythm  Abdomen: soft, non-tender. Bowel sounds normal. No masses,  no organomegaly    IV Lines: peripheral    Labs:    Recent Labs     19  1416 19  0337 19  0232   WBC 5.5  --   --    HGB 14.9  --   --      --   --    BUN  --  19 14   CREA  --  0.81 0.37*       Assessment:     1. MRSA septic flexor tenosynovitis of right middle finger     2. IVDU -- on methadone     3. Chronic Hep C      Plan:     1. Continue Vancomycin    2. HIV Ab if not recently done    3. Hep C RNA PCR ordered    4.  Hep A & Hep B serologies orgered      Janis Esparza MD

## 2019-02-26 NOTE — PROCEDURES
PICC Placement Note. Order received. PICC procedure, risks, benefits and complications reviewed with the patient. Everton Hwang signed informed consent for bedside placement of PICC line. Questions were answered to satisfaction. PRE-PROCEDURE VERIFICATION  Correct Procedure: yes  Correct Site:  yes  Temperature: Temp: 98.1 °F (36.7 °C), Temperature Source: Temp Source: Oral  Recent Labs     02/25/19  1416 02/25/19  0337   BUN  --  19   CREA  --  0.81     --    WBC 5.5  --      Allergies: Patient has no known allergies. Education materials, including PICC Booklet, for PICC Care given to patient: yes. See Patient Education activity for further details. PROCEDURE DETAIL  A double lumen PICC line was started for antibiotic therapy. The following documentation is in addition to the PICC properties in the lines/airways flowsheet : The vein was accessed with a 20g IV catheter using ultrasound guidance and a guidewire was easily thread. Modified Seldinger Technique was used to thread the PICC and the tip direction was determined with a PICC tip  device  Lot #: [id number]  Was xylocaine 1% used intradermally:  yes  Catheter Length: 43 (cm) placed at the 1cm berta according to ECG waveform technology. Vein Selection for PICC:left brachial  Central Line Bundle followed yes  Complication Related to Insertion: none    The placement was verified by ECG:  Waveform placed on the patient's bedside chart to document that the PICC tip is located in the  superior vena cava. Report given to Matthew Fox RN  The PICC line is functioning properly and ready for immediate use.

## 2019-02-26 NOTE — PROGRESS NOTES
Problem: Falls - Risk of  Goal: *Absence of Falls  Document Nimco Fall Risk and appropriate interventions in the flowsheet. Outcome: Progressing Towards Goal  Fall Risk Interventions:            Medication Interventions: Teach patient to arise slowly                  Problem: Pressure Injury - Risk of  Goal: *Prevention of pressure injury  Document Manuel Scale and appropriate interventions in the flowsheet.   Outcome: Progressing Towards Goal  Pressure Injury Interventions:  Sensory Interventions: Assess changes in LOC, Keep linens dry and wrinkle-free, Pressure redistribution bed/mattress (bed type)    Moisture Interventions: Absorbent underpads    Activity Interventions: Increase time out of bed    Mobility Interventions: Pressure redistribution bed/mattress (bed type)    Nutrition Interventions: Document food/fluid/supplement intake    Friction and Shear Interventions: Lift sheet

## 2019-02-26 NOTE — PROGRESS NOTES
Pt informed about transportation to PACU for IV placement. If that is not successful they are going to place a central line. She was informed that if peripheral IV placement successful, then she was still going to get her PICC line placed tomorrow. She denied any questions at present time. No distress noted. Call light in reach and bed in lowest position.     2200: peripheral IV placed    0533: pt refused labs

## 2019-02-26 NOTE — PROGRESS NOTES
Patient listed as not having a primary care physician. Hospital follow-up PCP transitional care appointment has been scheduled with Dr. Alba Kelly for Tuesday, 3/5/19 at 1:15 p.m. Pending patient discharge.   Kiki Horner, Care Management Specialist.
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
## 2019-02-27 LAB
ANION GAP SERPL CALC-SCNC: 7 MMOL/L (ref 5–15)
BUN SERPL-MCNC: 22 MG/DL (ref 6–20)
BUN/CREAT SERPL: 27 (ref 12–20)
CALCIUM SERPL-MCNC: 8.8 MG/DL (ref 8.5–10.1)
CHLORIDE SERPL-SCNC: 101 MMOL/L (ref 97–108)
CO2 SERPL-SCNC: 28 MMOL/L (ref 21–32)
CREAT SERPL-MCNC: 0.83 MG/DL (ref 0.55–1.02)
DATE LAST DOSE: ABNORMAL
GLUCOSE SERPL-MCNC: 86 MG/DL (ref 65–100)
HBV SURFACE AB SER QL: NONREACTIVE
HBV SURFACE AB SER-ACNC: <3.1 MIU/ML
HBV SURFACE AG SER QL: 0.12 INDEX
HBV SURFACE AG SER QL: NEGATIVE
HIV 1+2 AB+HIV1 P24 AG SERPL QL IA: NONREACTIVE
HIV12 RESULT COMMENT, HHIVC: NORMAL
POTASSIUM SERPL-SCNC: 4.7 MMOL/L (ref 3.5–5.1)
REPORTED DOSE,DOSE: ABNORMAL UNITS
REPORTED DOSE/TIME,TMG: ABNORMAL
SODIUM SERPL-SCNC: 136 MMOL/L (ref 136–145)
VANCOMYCIN TROUGH SERPL-MCNC: 14.3 UG/ML (ref 5–10)

## 2019-02-27 PROCEDURE — 36415 COLL VENOUS BLD VENIPUNCTURE: CPT

## 2019-02-27 PROCEDURE — 87522 HEPATITIS C REVRS TRNSCRPJ: CPT

## 2019-02-27 PROCEDURE — 74011250637 HC RX REV CODE- 250/637: Performed by: HOSPITALIST

## 2019-02-27 PROCEDURE — 80048 BASIC METABOLIC PNL TOTAL CA: CPT

## 2019-02-27 PROCEDURE — 87902 NFCT AGT GNTYP ALYS HEP C: CPT

## 2019-02-27 PROCEDURE — 65270000029 HC RM PRIVATE

## 2019-02-27 PROCEDURE — 97530 THERAPEUTIC ACTIVITIES: CPT

## 2019-02-27 PROCEDURE — 87389 HIV-1 AG W/HIV-1&-2 AB AG IA: CPT

## 2019-02-27 PROCEDURE — 87340 HEPATITIS B SURFACE AG IA: CPT

## 2019-02-27 PROCEDURE — 74011250636 HC RX REV CODE- 250/636: Performed by: INTERNAL MEDICINE

## 2019-02-27 PROCEDURE — 86708 HEPATITIS A ANTIBODY: CPT

## 2019-02-27 PROCEDURE — 80202 ASSAY OF VANCOMYCIN: CPT

## 2019-02-27 PROCEDURE — 86706 HEP B SURFACE ANTIBODY: CPT

## 2019-02-27 RX ORDER — METHADONE HYDROCHLORIDE 5 MG/5ML
60 SOLUTION ORAL DAILY
Status: DISCONTINUED | OUTPATIENT
Start: 2019-02-28 | End: 2019-03-06 | Stop reason: HOSPADM

## 2019-02-27 RX ADMIN — METHADONE HYDROCHLORIDE 55 MG: 5 SOLUTION ORAL at 10:22

## 2019-02-27 RX ADMIN — Medication 10 ML: at 15:29

## 2019-02-27 RX ADMIN — VANCOMYCIN HYDROCHLORIDE 1000 MG: 1 INJECTION, POWDER, LYOPHILIZED, FOR SOLUTION INTRAVENOUS at 19:37

## 2019-02-27 RX ADMIN — IBUPROFEN 400 MG: 400 TABLET ORAL at 10:21

## 2019-02-27 RX ADMIN — VANCOMYCIN HYDROCHLORIDE 1000 MG: 1 INJECTION, POWDER, LYOPHILIZED, FOR SOLUTION INTRAVENOUS at 08:00

## 2019-02-27 RX ADMIN — Medication 10 ML: at 21:27

## 2019-02-27 RX ADMIN — IBUPROFEN 400 MG: 400 TABLET ORAL at 15:28

## 2019-02-27 RX ADMIN — IBUPROFEN 400 MG: 400 TABLET ORAL at 21:27

## 2019-02-27 RX ADMIN — Medication 10 ML: at 05:43

## 2019-02-27 NOTE — PROGRESS NOTES
Hospitalist Progress Note  Óscar Carbone MD  Answering service: [phone] OR [phone] from in house phone      Date of Service:  2019  NAME:  Blanka Martines  :  1970  MRN:  [id number]      Admission Summary:   Pt admitted for Right hand pain ans swelling after hitting it      Interval history / Subjective:   Pt seen in follow up of Right Middle Finger Tenosynovitis    S/p Debridement  She reports pain under control     In understanding with the plan for Inpatient stay Dr. Valerie Teresa managing abx      Assessment & Plan:     1. Right Middle Finger tenosynovitis - s/p I and D on  - On IV vancomycin and Rocephin , ID consult will be requested. MRSA positive in nares, Prelim Cultures showing MRSA. D/C Rocephin. Treated as skin/soft tissue infection. Appreciate ID consult - c/w IV vancomycin. Risk of interaction of bactrim with methadone (which patient is on)     2. Chronic Hep C and elevated LFT - monitor, Hepatitis panel sent    3. Hypokalemia and hypomag - Repleted, recheck in am     4. H.o Opioid Dependence  - on methadone as op  UDS on admission - + amphetamines,Cocaine,methadone,opiates. Code status: Full  DVT prophylaxis: heparin    Risk of deterioration: medium       Care Plan discussed with: Patient/Family and Nurse  Disposition: TBD once finished IV abx      Hospital Problems  Date Reviewed: 2019          Codes Class Noted POA    Infection of hand ICD-10-CM: L08.9  ICD-9-CM: 686.9  2019 Unknown        * (Principal) Cellulitis of hand ICD-10-CM: D99.131  ICD-9-CM: 682.4  2019 Yes                Review of Systems:   Pertinent items are noted in HPI. Vital Signs:    Last 24hrs VS reviewed since prior progress note.  Most recent are:  Visit Vitals  /65   Pulse 70   Temp 97.5 °F (36.4 °C)   Resp 16   Ht 5' 4\" (1.626 m)   Wt 56.7 kg (125 lb)   SpO2 97%   BMI 21.46 kg/m²       No intake or output data in the 24 hours ending 02/27/19 0944     Physical Examination:             Constitutional:  No acute distress, cooperative, pleasant    ENT:  Oral mucous moist, oropharynx benign. Neck supple,    Resp:  CTA bilaterally. No wheezing/rhonchi/rales. No accessory muscle use   CV:  Regular rhythm, normal rate, no murmurs, gallops, rubs    GI:  Soft, non distended, non tender. normoactive bowel sounds, no hepatosplenomegaly     Musculoskeletal:  No edema, warm, 2+ pulses throughout, Right Arm is bandaged. able to wiggle fingers,Non Cellulitic arm proximally      Neurologic:  Moves all extremities. AAOx3, CN II-XII reviewed     Psych:  Good insight, Not anxious nor agitated. Data Review:    Review and/or order of clinical lab test  Review and/or order of tests in the radiology section of [facility]      Labs:     Recent Labs     02/25/19  1416   WBC 5.5   HGB 14.9   HCT 45.3        Recent Labs     02/27/19  0554 02/25/19  0337    135*   K 4.7 5.1    102   CO2 28 22   BUN 22* 19   CREA 0.83 0.81   GLU 86 101*   CA 8.8 9.2   MG  --  2.1     No results for input(s): SGOT, GPT, ALT, AP, TBIL, TBILI, TP, ALB, GLOB, GGT, AML, LPSE in the last 72 hours. No lab exists for component: AMYP, HLPSE  No results for input(s): INR, PTP, APTT in the last 72 hours. No lab exists for component: INREXT, INREXT   No results for input(s): FE, TIBC, PSAT, FERR in the last 72 hours. No results found for: FOL, RBCF   No results for input(s): PH, PCO2, PO2 in the last 72 hours. No results for input(s): CPK, CKNDX, TROIQ in the last 72 hours.     No lab exists for component: CPKMB  No results found for: CHOL, CHOLX, CHLST, CHOLV, HDL, LDL, LDLC, DLDLP, TGLX, TRIGL, TRIGP, CHHD, CHHDX  Lab Results   Component Value Date/Time    Glucose (POC) 130 (H) 02/24/2019 09:31 AM     No results found for: COLOR, APPRN, SPGRU, REFSG, SUE, PROTU, GLUCU, KETU, BILU, UROU, YENIFER, LEUKU, GLUKE, EPSU, BACTU, WBCU, RBCU, CASTS, UCRY      Medications Reviewed:     Current Facility-Administered Medications   Medication Dose Route Frequency    diphenhydrAMINE (BENADRYL) capsule 25 mg  25 mg Oral Q6H PRN    sodium chloride (NS) flush 10 mL  10 mL InterCATHeter PRN    alteplase (CATHFLO) 1 mg in sterile water (preservative free) 1 mL injection  1 mg InterCATHeter PRN    vancomycin (VANCOCIN) 1,000 mg in 0.9% sodium chloride (MBP/ADV) 250 mL  1,000 mg IntraVENous Q12H    heparin (porcine) injection 5,000 Units  5,000 Units SubCUTAneous Q12H    sodium chloride (NS) flush 5-40 mL  5-40 mL IntraVENous Q8H    sodium chloride (NS) flush 5-40 mL  5-40 mL IntraVENous PRN    methadone (DOLOPHINE) 5 mg/5 mL oral solution 55 mg  55 mg Oral DAILY    ibuprofen (MOTRIN) tablet 400 mg  400 mg Oral TID    Vancomycin Pharmacy Dosing   Other PRN     ______________________________________________________________________  EXPECTED LENGTH OF STAY: 2d 7h  ACTUAL LENGTH OF STAY:          5                 Jesica Patino MD   Patient has given Verbal permission to discuss medical care with   persons present in the room and and also with contact as listed on face sheet.

## 2019-02-27 NOTE — PROGRESS NOTES
Problem: Falls - Risk of  Goal: *Absence of Falls  Document Nimco Fall Risk and appropriate interventions in the flowsheet. Outcome: Progressing Towards Goal  Fall Risk Interventions:    Medication Interventions: Evaluate medications/consider consulting pharmacy, Teach patient to arise slowly    History of Falls Interventions: Evaluate medications/consider consulting pharmacy    Problem: Pressure Injury - Risk of  Goal: *Prevention of pressure injury  Document Manuel Scale and appropriate interventions in the flowsheet.   Outcome: Progressing Towards Goal  Pressure Injury Interventions:  Sensory Interventions: Assess changes in LOC, Assess need for specialty bed    Moisture Interventions: Absorbent underpads    Activity Interventions: Increase time out of bed, Pressure redistribution bed/mattress(bed type)    Mobility Interventions: HOB 30 degrees or less, Pressure redistribution bed/mattress (bed type)    Nutrition Interventions: Document food/fluid/supplement intake    Friction and Shear Interventions: HOB 30 degrees or less, Lift sheet

## 2019-02-27 NOTE — PROGRESS NOTES
Problem: Self Care Deficits Care Plan (Adult)  Goal: *Acute Goals and Plan of Care (Insert Text)  1. Patient will perform R hand/ finger exercises independently within 7 days. MET 2/27/19  2. Patient will achieve full AROM of R hand 3rd digit with all joints in all planes within 7 days. Occupational Therapy TREATMENT  Patient: Beth Garcia (58 y.o. female)  Date: 2/27/2019  Diagnosis: Cellulitis of hand [L03.119]  Infection of hand [L08.9] Cellulitis of hand  Procedure(s) (LRB):  INCISION AND DRAINAGE RIGHT MIDDLE FINGER (Right) 7 Days Post-Op  Precautions:    Chart, occupational therapy assessment, plan of care, and goals were reviewed. ASSESSMENT:   The patient presents with independence with right hand AROM exercises issued this past week. Patient limited with range secondary to varying thicknesses of dressings which are being changed daily. Patient to address thickness of dressings with MD when changed today to see if a thinner dressing is possible. Per chart, patient will be inpatient until IV antibiotics are complete. Will decrease plan of care to 2 times a week to follow progress. Patient encouraged to be up, mobilizing and performing self care with Mod I to I as much as possible. States she is OOB and performing own care when not attached to IV. Currently receiving IV antibiotics twice a day for several hours each time. The following are barriers to independence with ADLs while in acute care:   - Cognitive and/or behavioral: none  - Medical condition: ROM, strength, functional endurance and medical history    - Other: Patient is opoid dependent      Prior level of function: independent with ADLs/ IADLs, living alone, ambulatory with no AD         PLAN:  Patient continues to benefit from skilled intervention to address the above impairments. Continue treatment with change from 3 times a week to 2 times a week.   Recommendations and/or planned interventions for staff:  Right hand exercises with follow for progress of third finger AROM    Discharge recommendations: Outpatient Hand Therapist  Barriers to discharging home, in addition to above listed impairments: On IV antibiotics. Patient is opoid dependent per chart     Equipment recommendations for successful discharge (if) home: none     SUBJECTIVE:   Patient stated It's hard to monitor progress because the dressing is so thick    OBJECTIVE DATA SUMMARY:   Cognitive/Behavioral Status:  Neurologic State: Alert  Orientation Level: Oriented to person;Oriented to place;Oriented to situation  Cognition: Follows commands; Appropriate decision making; Appropriate for age attention/concentration; Appropriate safety awareness  Perception: Appears intact  Perseveration: No perseveration noted  Safety/Judgement: Awareness of environment    Functional Mobility and Transfers for ADLs:       Cognitive Retraining  Safety/Judgement: Awareness of environment    Activity Tolerance:   Good  Please refer to the flowsheet for vital signs taken during this treatment.     After treatment patient left:   Supine in bed  Bed in low position  Call light within reach  RN notified     COMMUNICATION/COLLABORATION:   The patients plan of care was discussed with: Occupational Therapist and Registered Nurse    TRIP Benavides  Time Calculation: 9 mins

## 2019-02-27 NOTE — PROGRESS NOTES
Infectious Diseases Progress Note    Antibiotic Summary:  Zosyn  2/19 x 1 dose  Vancomycin  -- present  Rocephin  --     Surgical I&D right middle finger on 2019: POD # 5    Subjective:     Right middle finger feels better    Objective:     Vitals:   Visit Vitals  BP 92/64 (BP 1 Location: Left arm, BP Patient Position: At rest)   Pulse 74   Temp 98.1 °F (36.7 °C)   Resp 18   Ht 5' 4\" (1.626 m)   Wt 56.7 kg (125 lb)   LMP 2018 (Within Months)   SpO2 95%   BMI 21.46 kg/m²        Tmax:  Temp (24hrs), Av.9 °F (36.6 °C), Min:97.7 °F (36.5 °C), Max:98.1 °F (36.7 °C)      Exam:  General appearance: alert, no distress  Lungs: clear to auscultation bilaterally  Heart: regular rate and rhythm  Abdomen: soft, non-tender. Bowel sounds normal.   Right hand: incision on middle finger looks better    IV Lines: Left PICC inserted 2019    Labs:    Recent Labs     19  1416 19  0337 19  0232   WBC 5.5  --   --    HGB 14.9  --   --      --   --    BUN  --  19 14   CREA  --  0.81 0.37*     HIV Ab/Ag = ordered  Hep A total Ab = ordered  HBsAg = ordered  HBsAb = ordered  Hep C RNA PCR and genotype = ordered    Assessment:     1. MRSA septic flexor tenosynovitis of right middle finger     2. IVDU -- on methadone     3. Chronic Hep C      Plan:     1. Continue Vancomycin    2. HIV Ab/Ag, HAA, HBsAg, HBsAb, Hep C RNA PCR & genotype ordered    3.  Vanc trough in AM as creatinine is normal but higher        Cayetano Addison MD

## 2019-02-28 LAB
ANION GAP SERPL CALC-SCNC: 7 MMOL/L (ref 5–15)
BUN SERPL-MCNC: 16 MG/DL (ref 6–20)
BUN/CREAT SERPL: 18 (ref 12–20)
CALCIUM SERPL-MCNC: 8.6 MG/DL (ref 8.5–10.1)
CHLORIDE SERPL-SCNC: 103 MMOL/L (ref 97–108)
CO2 SERPL-SCNC: 28 MMOL/L (ref 21–32)
CREAT SERPL-MCNC: 0.88 MG/DL (ref 0.55–1.02)
GLUCOSE SERPL-MCNC: 110 MG/DL (ref 65–100)
HAV AB SER QL IA: NEGATIVE
POTASSIUM SERPL-SCNC: 4.1 MMOL/L (ref 3.5–5.1)
SODIUM SERPL-SCNC: 138 MMOL/L (ref 136–145)

## 2019-02-28 PROCEDURE — 74011250636 HC RX REV CODE- 250/636: Performed by: INTERNAL MEDICINE

## 2019-02-28 PROCEDURE — 74011250637 HC RX REV CODE- 250/637: Performed by: HOSPITALIST

## 2019-02-28 PROCEDURE — 80048 BASIC METABOLIC PNL TOTAL CA: CPT

## 2019-02-28 PROCEDURE — 65270000029 HC RM PRIVATE

## 2019-02-28 RX ADMIN — VANCOMYCIN HYDROCHLORIDE 1000 MG: 1 INJECTION, POWDER, LYOPHILIZED, FOR SOLUTION INTRAVENOUS at 19:57

## 2019-02-28 RX ADMIN — METHADONE HYDROCHLORIDE 60 MG: 5 SOLUTION ORAL at 09:40

## 2019-02-28 RX ADMIN — IBUPROFEN 400 MG: 400 TABLET ORAL at 21:30

## 2019-02-28 RX ADMIN — Medication 10 ML: at 14:15

## 2019-02-28 RX ADMIN — VANCOMYCIN HYDROCHLORIDE 1000 MG: 1 INJECTION, POWDER, LYOPHILIZED, FOR SOLUTION INTRAVENOUS at 07:11

## 2019-02-28 RX ADMIN — Medication 10 ML: at 02:09

## 2019-02-28 RX ADMIN — Medication 10 ML: at 21:30

## 2019-02-28 NOTE — PROGRESS NOTES
Infectious Diseases Progress Note    Antibiotic Summary:  Zosyn  2/19 x 1 dose  Vancomycin  -- present  Rocephin  --     Surgical I&D right middle finger on 2019: POD # 7    Subjective:     No complaints except some swelling of the right middle finger    Objective:     Vitals:   Visit Vitals  /72 (BP 1 Location: Right arm, BP Patient Position: At rest)   Pulse (!) 57   Temp 98.1 °F (36.7 °C)   Resp 16   Ht 5' 4\" (1.626 m)   Wt 56.7 kg (125 lb)   LMP 2018 (Within Months)   SpO2 97%   BMI 21.46 kg/m²        Tmax:  Temp (24hrs), Av.9 °F (36.6 °C), Min:97.5 °F (36.4 °C), Max:98.3 °F (36.8 °C)      Exam:  General appearance: alert, no distress  Lungs: clear to auscultation bilaterally  Heart: regular rate and rhythm  Abdomen: soft, non-tender. Bowel sounds normal.   Right hand: incision on middle finger improved    IV Lines: Left PICC inserted 2019    Labs:    Recent Labs     19  0554 19  1416 19  0337   WBC  --  5.5  --    HGB  --  14.9  --    PLT  --  263  --    BUN 22*  --  19   CREA 0.83  --  0.81     HIV Ab/Ag = negative  Hep A total Ab = pending  HBsAg = negative  HBsAb = ordered  Hep C RNA PCR and genotype = ordered    Vanc trough  = 14.3    Assessment:     1. MRSA septic flexor tenosynovitis of right middle finger     2. IVDU -- on methadone     3. Chronic Hep C      Plan:     1. Continue Vancomycin    2.  HAA, HBsAb, Hep C RNA PCR & genotype ordered      Cayetano Addison MD

## 2019-02-28 NOTE — PROGRESS NOTES
Hospitalist Progress Note  Jacy Devine MD  Answering service: [phone] OR [phone] from in house phone      Date of Service:  2019  NAME:  Hali Norman  :  1970  MRN:  [id number]      Admission Summary:   Pt admitted for Right hand pain ans swelling after hitting it      Interval history / Subjective:   Pt seen in follow up of Right Middle Finger Tenosynovitis    S/p Debridement She reports pain under control   Per request methadone dose adjusted    In understanding with the plan for Inpatient stay Dr. Skylar Bonds managing abx      Assessment & Plan:     1. Right Middle Finger tenosynovitis - s/p I and D on  - On IV vancomycin and Rocephin , ID consult will be requested. MRSA positive in nares, Prelim Cultures showing MRSA. D/C Rocephin. Treated as skin/soft tissue infection. Appreciate ID consult - c/w IV vancomycin. Risk of interaction of bactrim with methadone (which patient is on)     2. Chronic Hep C and elevated LFT - monitor, Hepatitis panel sent    3. Hypokalemia and hypomag - Repleted, recheck in am     4. H.o Opioid Dependence  - on methadone as op  UDS on admission - + amphetamines,Cocaine,methadone,opiates. Code status: Full  DVT prophylaxis: heparin    Risk of deterioration: medium       Care Plan discussed with: Patient/Family and Nurse  Disposition: TBD once finished IV abx      Hospital Problems  Date Reviewed: 2019          Codes Class Noted POA    Infection of hand ICD-10-CM: L08.9  ICD-9-CM: 686.9  2019 Unknown        * (Principal) Cellulitis of hand ICD-10-CM: T40.129  ICD-9-CM: 682.4  2019 Yes                Review of Systems:   Pertinent items are noted in HPI. Vital Signs:    Last 24hrs VS reviewed since prior progress note.  Most recent are:  Visit Vitals  /65 (BP 1 Location: Right arm, BP Patient Position: At rest)   Pulse 66   Temp 97.6 °F (36.4 °C)   Resp 16   Ht 5' 4\" (1.626 m)   Wt 56.7 kg (125 lb)   SpO2 97%   BMI 21.46 kg/m²       No intake or output data in the 24 hours ending 02/28/19 6252     Physical Examination:             Constitutional:  No acute distress, cooperative, pleasant    ENT:  Oral mucous moist, oropharynx benign. Neck supple,    Resp:  CTA bilaterally. No wheezing/rhonchi/rales. No accessory muscle use   CV:  Regular rhythm, normal rate, no murmurs, gallops, rubs    GI:  Soft, non distended, non tender. normoactive bowel sounds, no hepatosplenomegaly     Musculoskeletal:  No edema, warm, 2+ pulses throughout, Right Arm is bandaged. able to wiggle fingers,Non Cellulitic arm proximally      Neurologic:  Moves all extremities. AAOx3, CN II-XII reviewed     Psych:  Good insight, Not anxious nor agitated. Data Review:    Review and/or order of clinical lab test  Review and/or order of tests in the radiology section of [facility]      Labs:     Recent Labs     02/25/19  1416   WBC 5.5   HGB 14.9   HCT 45.3        Recent Labs     02/28/19  0103 02/27/19  0554    136   K 4.1 4.7    101   CO2 28 28   BUN 16 22*   CREA 0.88 0.83   * 86   CA 8.6 8.8     No results for input(s): SGOT, GPT, ALT, AP, TBIL, TBILI, TP, ALB, GLOB, GGT, AML, LPSE in the last 72 hours. No lab exists for component: AMYP, HLPSE  No results for input(s): INR, PTP, APTT in the last 72 hours. No lab exists for component: INREXT, INREXT   No results for input(s): FE, TIBC, PSAT, FERR in the last 72 hours. No results found for: FOL, RBCF   No results for input(s): PH, PCO2, PO2 in the last 72 hours. No results for input(s): CPK, CKNDX, TROIQ in the last 72 hours.     No lab exists for component: CPKMB  No results found for: CHOL, CHOLX, CHLST, CHOLV, HDL, LDL, LDLC, DLDLP, TGLX, TRIGL, TRIGP, CHHD, CHHDX  Lab Results   Component Value Date/Time    Glucose (POC) 130 (H) 02/24/2019 09:31 AM     No results found for: COLOR, APPRN, SPGRU, REFSG, SUE, PROTU, GLUCU, KETU, BILU, UROU, YENIFER, LEUKU, GLUKE, EPSU, BACTU, WBCU, RBCU, CASTS, UCRY      Medications Reviewed:     Current Facility-Administered Medications   Medication Dose Route Frequency    methadone (DOLOPHINE) 5 mg/5 mL oral solution 60 mg  60 mg Oral DAILY    diphenhydrAMINE (BENADRYL) capsule 25 mg  25 mg Oral Q6H PRN    sodium chloride (NS) flush 10 mL  10 mL InterCATHeter PRN    alteplase (CATHFLO) 1 mg in sterile water (preservative free) 1 mL injection  1 mg InterCATHeter PRN    vancomycin (VANCOCIN) 1,000 mg in 0.9% sodium chloride (MBP/ADV) 250 mL  1,000 mg IntraVENous Q12H    heparin (porcine) injection 5,000 Units  5,000 Units SubCUTAneous Q12H    sodium chloride (NS) flush 5-40 mL  5-40 mL IntraVENous Q8H    sodium chloride (NS) flush 5-40 mL  5-40 mL IntraVENous PRN    ibuprofen (MOTRIN) tablet 400 mg  400 mg Oral TID    Vancomycin Pharmacy Dosing   Other PRN     ______________________________________________________________________  EXPECTED LENGTH OF STAY: 2d 7h  ACTUAL LENGTH OF STAY:          Masha Clark MD   Patient has given Verbal permission to discuss medical care with   persons present in the room and and also with contact as listed on face sheet.

## 2019-02-28 NOTE — PROGRESS NOTES
Problem: Falls - Risk of  Goal: *Absence of Falls  Document Nimco Fall Risk and appropriate interventions in the flowsheet.   Outcome: Progressing Towards Goal  Fall Risk Interventions:            Medication Interventions: Evaluate medications/consider consulting pharmacy, Patient to call before getting OOB, Teach patient to arise slowly         History of Falls Interventions: Evaluate medications/consider consulting pharmacy

## 2019-02-28 NOTE — PROGRESS NOTES
Ortho:  Some swelling yesterday she attributes to increased sodium in diet. No pain. Wants dressing off couple times daily to facilitate AROM. Afebrile. Right hand middle finger incision healing. No maceration, drainage, erythema. Some swelling. Limited flexion. Able to get just shy full extension. CR brisk. Imp:  POD #8 s/p I&D right hand middle finger tenosynovitis, MRSA     Plan:  1. Daily dressing. OK to remove for AROM and nurses can redress. 2. IV abx per ID.  3. Elevate to control swelling.     TIM Rosario

## 2019-03-01 LAB
ALBUMIN SERPL-MCNC: 3.5 G/DL (ref 3.5–5)
ALBUMIN/GLOB SERPL: 0.9 {RATIO} (ref 1.1–2.2)
ALP SERPL-CCNC: 117 U/L (ref 45–117)
ALT SERPL-CCNC: 390 U/L (ref 12–78)
ANION GAP SERPL CALC-SCNC: 10 MMOL/L (ref 5–15)
ANION GAP SERPL CALC-SCNC: 8 MMOL/L (ref 5–15)
AST SERPL-CCNC: 223 U/L (ref 15–37)
BILIRUB SERPL-MCNC: 0.4 MG/DL (ref 0.2–1)
BUN SERPL-MCNC: 15 MG/DL (ref 6–20)
BUN SERPL-MCNC: 15 MG/DL (ref 6–20)
BUN/CREAT SERPL: 18 (ref 12–20)
BUN/CREAT SERPL: 18 (ref 12–20)
CALCIUM SERPL-MCNC: 8.7 MG/DL (ref 8.5–10.1)
CALCIUM SERPL-MCNC: 8.9 MG/DL (ref 8.5–10.1)
CHLORIDE SERPL-SCNC: 103 MMOL/L (ref 97–108)
CHLORIDE SERPL-SCNC: 103 MMOL/L (ref 97–108)
CO2 SERPL-SCNC: 27 MMOL/L (ref 21–32)
CO2 SERPL-SCNC: 28 MMOL/L (ref 21–32)
CREAT SERPL-MCNC: 0.83 MG/DL (ref 0.55–1.02)
CREAT SERPL-MCNC: 0.85 MG/DL (ref 0.55–1.02)
DATE LAST DOSE: ABNORMAL
ERYTHROCYTE [DISTWIDTH] IN BLOOD BY AUTOMATED COUNT: 12.8 % (ref 11.5–14.5)
GLOBULIN SER CALC-MCNC: 3.9 G/DL (ref 2–4)
GLUCOSE SERPL-MCNC: 123 MG/DL (ref 65–100)
GLUCOSE SERPL-MCNC: 128 MG/DL (ref 65–100)
HCT VFR BLD AUTO: 39.3 % (ref 35–47)
HCV GENOTYPE: NORMAL
HCV GENTYP SERPL NAA+PROBE: NORMAL
HCV RNA SERPL NAA+PROBE-ACNC: NORMAL IU/ML
HCV RNA SERPL NAA+PROBE-LOG IU: 6.75 LOG10 IU/ML
HGB BLD-MCNC: 13.1 G/DL (ref 11.5–16)
MCH RBC QN AUTO: 31.8 PG (ref 26–34)
MCHC RBC AUTO-ENTMCNC: 33.3 G/DL (ref 30–36.5)
MCV RBC AUTO: 95.4 FL (ref 80–99)
NRBC # BLD: 0 K/UL (ref 0–0.01)
NRBC BLD-RTO: 0 PER 100 WBC
PLATELET # BLD AUTO: 168 K/UL (ref 150–400)
PMV BLD AUTO: 10.2 FL (ref 8.9–12.9)
POTASSIUM SERPL-SCNC: 3.7 MMOL/L (ref 3.5–5.1)
POTASSIUM SERPL-SCNC: 3.7 MMOL/L (ref 3.5–5.1)
PROT SERPL-MCNC: 7.4 G/DL (ref 6.4–8.2)
RBC # BLD AUTO: 4.12 M/UL (ref 3.8–5.2)
REPORTED DOSE,DOSE: ABNORMAL UNITS
REPORTED DOSE/TIME,TMG: ABNORMAL
SODIUM SERPL-SCNC: 139 MMOL/L (ref 136–145)
SODIUM SERPL-SCNC: 140 MMOL/L (ref 136–145)
TEST INFORMATION, 550045: NORMAL
VANCOMYCIN TROUGH SERPL-MCNC: 12.9 UG/ML (ref 5–10)
WBC # BLD AUTO: 5.3 K/UL (ref 3.6–11)

## 2019-03-01 PROCEDURE — 80202 ASSAY OF VANCOMYCIN: CPT

## 2019-03-01 PROCEDURE — 85027 COMPLETE CBC AUTOMATED: CPT

## 2019-03-01 PROCEDURE — 74011250637 HC RX REV CODE- 250/637: Performed by: HOSPITALIST

## 2019-03-01 PROCEDURE — 80053 COMPREHEN METABOLIC PANEL: CPT

## 2019-03-01 PROCEDURE — 74011250636 HC RX REV CODE- 250/636: Performed by: INTERNAL MEDICINE

## 2019-03-01 PROCEDURE — 65270000029 HC RM PRIVATE

## 2019-03-01 PROCEDURE — 36415 COLL VENOUS BLD VENIPUNCTURE: CPT

## 2019-03-01 RX ADMIN — IBUPROFEN 400 MG: 400 TABLET ORAL at 15:37

## 2019-03-01 RX ADMIN — VANCOMYCIN HYDROCHLORIDE 1000 MG: 1 INJECTION, POWDER, LYOPHILIZED, FOR SOLUTION INTRAVENOUS at 06:57

## 2019-03-01 RX ADMIN — Medication 10 ML: at 13:04

## 2019-03-01 RX ADMIN — VANCOMYCIN HYDROCHLORIDE 1000 MG: 1 INJECTION, POWDER, LYOPHILIZED, FOR SOLUTION INTRAVENOUS at 19:17

## 2019-03-01 RX ADMIN — IBUPROFEN 400 MG: 400 TABLET ORAL at 08:58

## 2019-03-01 RX ADMIN — IBUPROFEN 400 MG: 400 TABLET ORAL at 21:01

## 2019-03-01 RX ADMIN — Medication 10 ML: at 21:01

## 2019-03-01 RX ADMIN — METHADONE HYDROCHLORIDE 60 MG: 5 SOLUTION ORAL at 09:00

## 2019-03-01 NOTE — CONSULTS
[address] [facility], [place], [address], [place], [place]       Kristyn Rivers, DANNIE Lacy, [facility]   JOSÉ LUIS Rob NP [address] [facility] 136    at [address]    [address] [place] [address], [address] [address] Basilio  22.    [phone]    FAX: [address] [place] [address], [address]    [phone]    FAX: [phone]       HEPATOLOGY CONSULT NOTE  I was asked to see this patient in consultation by Dr Nena Simental for management of HCV. I have reviewed the  Emergency room note, Hospital admission note, Notes by all other physicians who have seen the patient during this hospitalization to date. I have reviewed the problem list and the reason for this hospitalization. I have reviewed the allergies and the medications the patient was taking at home prior to this hospitalization. HISTORY:  The patient is a 53year old with  Long history o IV drug use. She had an episode of acute icteric HCV and was hospitalized at [facility] in 2010. She has recently entered a methadone treatment program.  She last used IV drugs 2 weeks ago. She has no other PMH    She is hospitalized because of a severe infection in her finger. ASSESSMENT AND PLAN:  Chronic HCV   Chronic HCV of unclear severity. Liver transaminases are elevated. ALP is normal.  Liver function is normal.  The platelet count is normal.   HCV RNA is positive  Will perform HCV genotype to define the specific treatment and duration of treatment that will be required. Will perform  serologic and virologic studies to assess for other causes of chronic liver disease. Will perform imaging of the liver with ultrasound.       Will will perform elastography with Fiboscan after hospital discharge and make plans to teat HCV after she is drug free for 6 months. The patient should be treated with Harvoni (sofasbuvir and ledipasvir), Mavyret (glecaprevir and piprentasvir)  Or Epclusa (sofosbuvir and valpitasvir) depending upon HCV genotype. The duration of treatment will be 2 or 3 months depending upon the agent, genotype and severity of fibrosis on Fibroscan. SYSTEM REVIEW:  Constitution systems: Negative for fever, chills, weight gain, weight loss. Eyes: Negative for visual changes. ENT: Negative for sore throat, painful swallowing. Respiratory: Negative for cough, hemoptysis, SOB. Cardiology: Negative for chest pain, palpitations. GI:  Negative for constipation or diarrhea. : Negative for urinary frequency, dysuria, hematuria, nocturia. Skin: Negative for rash. Hematology: Negative for easy bruising, blood clots. Musculo-skelatal: Finger with infection and in cast.    Neurologic: Negative for headaches, dizziness, vertigo, memory problems not related to HE. Psychology: Negative for anxiety, depression. FAMILY HISTORY:  The father  of DM and complications of alcoholism. The mother  of COPD. SOCIAL HISTORY:  The patient is . The patient has 3 children,   The patient currently smokes half pack of tobacco daily. The patient consumes 2-3 beers per day   The patient currently works full time as a  at [employer]. PHYSICAL EXAMINATION:  VS: per nursing note  General:  No acute distress. Eyes:  Sclera anicteric. ENT:  No oral lesions. Thyroid normal.  Nodes:  No adenopathy. Skin:  No spider angiomata. No jaundice. Respiratory:  Lungs clear to auscultation. Cardiovascular:  Regular heart rate. Abdomen:  Soft non-tender, No ascites. Extremities:  No lower extremity edema. No muscle wasting. Finger in cast.  Neurologic:  Alert and oriented. Cranial nerves grossly intact. No asterixis.       LABORATORY:  Results for Naa Abad (MRN [id number]) as of 3/1/2019 18:21   Ref. Range 2/27/2019 05:54 2/28/2019 01:03 3/1/2019 02:26 3/1/2019 09:52 3/1/2019 10:30   WBC Latest Ref Range: 3.6 - 11.0 K/uL   5.3     HGB Latest Ref Range: 11.5 - 16.0 g/dL   13.1     PLATELET Latest Ref Range: 150 - 400 K/uL   168     Sodium Latest Ref Range: 136 - 145 mmol/L 136 138  140 139   Potassium Latest Ref Range: 3.5 - 5.1 mmol/L 4.7 4.1  3.7 3.7   Chloride Latest Ref Range: 97 - 108 mmol/L 101 103  103 103   CO2 Latest Ref Range: 21 - 32 mmol/L 28 28  27 28   Glucose Latest Ref Range: 65 - 100 mg/dL 86 110 (H)  128 (H) 123 (H)   BUN Latest Ref Range: 6 - 20 MG/DL 22 (H) 16  15 15   Creatinine Latest Ref Range: 0.55 - 1.02 MG/DL 0.83 0.88  0.83 0.85   Bilirubin, total Latest Ref Range: 0.2 - 1.0 MG/DL     0.4   Albumin Latest Ref Range: 3.5 - 5.0 g/dL     3.5   ALT (SGPT) Latest Ref Range: 12 - 78 U/L     390 (H)   AST Latest Ref Range: 15 - 37 U/L     223 (H)   Alk.  phosphatase Latest Ref Range: 45 - 117 U/L     100 Shaquille Sinha MD  [facility] [address] [facility] [place] 22.  [phone]  [address] [place] [address]

## 2019-03-01 NOTE — PROGRESS NOTES
Infectious Diseases Progress Note    Antibiotic Summary:  Zosyn  2/19 x 1 dose  Vancomycin  -- present  Rocephin  --     Surgical I&D right middle finger on 2019: POD # 8    Subjective:     Feels better each day    Objective:     Vitals:   Visit Vitals  BP 92/61 (BP 1 Location: Right arm, BP Patient Position: At rest)   Pulse 64   Temp 98 °F (36.7 °C)   Resp 16   Ht 5' 4\" (1.626 m)   Wt 56.7 kg (125 lb)   LMP 2018 (Within Months)   SpO2 98%   BMI 21.46 kg/m²        Tmax:  Temp (24hrs), Av.9 °F (36.6 °C), Min:97.6 °F (36.4 °C), Max:98.1 °F (36.7 °C)      Exam:  General appearance: alert, no distress  Lungs: clear to auscultation bilaterally  Heart: regular rate and rhythm  Abdomen: soft, non-tender. Bowel sounds normal.   Right hand: incision on middle finger looks good with no cellulitis and no purulent drainage    IV Lines: Left PICC inserted 2019    Labs:    Recent Labs     19  0103 19  0554   BUN 16 22*   CREA 0.88 0.83     HIV Ab/Ag = negative  Hep A total Ab = negative  HBsAg = negative  HBsAb = negative  Hep C RNA PCR = positive with 5,670,000 copies  Hep C genotype = pending    Vanc trough  = 14.3    Assessment:     1. MRSA septic flexor tenosynovitis of right middle finger     2. IVDU -- on methadone     3. Chronic Hep C: The Hep C RNA PCR has returned positive. She needs referral to Hepatology for evaluation and treatment. She should be given the Hep A & hep B vaccine series. Acute Hep A or Hep B can be fulminant and life threatening in patients with chronic Hep C      Plan:     1. Continue Vancomycin    2. Referral to Hepatology for evaluation and treatment of chronic Hep C    3.  She needs the Jai Wanda vaccine series      Shayna Villagomez MD

## 2019-03-01 NOTE — PROGRESS NOTES
Hospitalist Progress Note  Rafaela Shine MD  Answering service: [phone] OR [phone] from in house phone      Date of Service:  3/1/2019  NAME:  Jarrell Esposito  :  1970  MRN:  [id number]      Admission Summary:   Pt admitted for Right hand pain ans swelling after hitting it      Interval history / Subjective:   Pt seen in follow up of Right Middle Finger Tenosynovitis    S/p Debridement She reports pain under control   Per request methadone dose adjusted    In understanding with the plan for Inpatient stay Dr. Esme Zapien as Hep C as well consulted hepatology      Assessment & Plan:     1. Right Middle Finger tenosynovitis - s/p I and D on  - On IV vancomycin and Rocephin , ID consult will be requested. MRSA positive in nares, Prelim Cultures showing MRSA. D/C Rocephin. Treated as skin/soft tissue infection. Appreciate ID consult - c/w IV vancomycin. Risk of interaction of bactrim with methadone (which patient is on)     2. Chronic Hep C and elevated LFT - monitor, Dr. Annalisa Uribe consulted    3. Hypokalemia and hypomag - Repleted, recheck in am     4. H.o Opioid Dependence  - on methadone as op  UDS on admission - + amphetamines,Cocaine,methadone,opiates. Code status: Full  DVT prophylaxis: heparin    Risk of deterioration: medium       Care Plan discussed with: Patient/Family and Nurse  Disposition: TBD once finished IV abx      Hospital Problems  Date Reviewed: 2019          Codes Class Noted POA    Infection of hand ICD-10-CM: L08.9  ICD-9-CM: 686.9  2019 Unknown        * (Principal) Cellulitis of hand ICD-10-CM: Z54.349  ICD-9-CM: 682.4  2019 Yes                Review of Systems:   Pertinent items are noted in HPI. Vital Signs:    Last 24hrs VS reviewed since prior progress note.  Most recent are:  Visit Vitals  BP 93/60 (BP 1 Location: Right arm, BP Patient Position: At rest)   Pulse 64 Temp 97.7 °F (36.5 °C)   Resp 16   Ht 5' 4\" (1.626 m)   Wt 56.7 kg (125 lb)   SpO2 98%   BMI 21.46 kg/m²       No intake or output data in the 24 hours ending 03/01/19 0954     Physical Examination:             Constitutional:  No acute distress, cooperative, pleasant    ENT:  Oral mucous moist, oropharynx benign. Neck supple,    Resp:  CTA bilaterally. No wheezing/rhonchi/rales. No accessory muscle use   CV:  Regular rhythm, normal rate, no murmurs, gallops, rubs    GI:  Soft, non distended, non tender. normoactive bowel sounds, no hepatosplenomegaly     Musculoskeletal:  No edema, warm, 2+ pulses throughout, Right Arm is bandaged. able to wiggle fingers,Non Cellulitic arm proximally      Neurologic:  Moves all extremities. AAOx3, CN II-XII reviewed     Psych:  Good insight, Not anxious nor agitated. Data Review:    Review and/or order of clinical lab test  Review and/or order of tests in the radiology section of C[facility]      Labs:     Recent Labs     03/01/19  0226   WBC 5.3   HGB 13.1   HCT 39.3        Recent Labs     02/28/19  0103 02/27/19  0554    136   K 4.1 4.7    101   CO2 28 28   BUN 16 22*   CREA 0.88 0.83   * 86   CA 8.6 8.8     No results for input(s): SGOT, GPT, ALT, AP, TBIL, TBILI, TP, ALB, GLOB, GGT, AML, LPSE in the last 72 hours. No lab exists for component: AMYP, HLPSE  No results for input(s): INR, PTP, APTT in the last 72 hours. No lab exists for component: INREXT, INREXT   No results for input(s): FE, TIBC, PSAT, FERR in the last 72 hours. No results found for: FOL, RBCF   No results for input(s): PH, PCO2, PO2 in the last 72 hours. No results for input(s): CPK, CKNDX, TROIQ in the last 72 hours.     No lab exists for component: CPKMB  No results found for: CHOL, CHOLX, CHLST, CHOLV, HDL, LDL, LDLC, DLDLP, TGLX, TRIGL, TRIGP, CHHD, CHHDX  Lab Results   Component Value Date/Time    Glucose (POC) 130 (H) 02/24/2019 09:31 AM     No results found for: COLOR, APPRN, SPGRU, REFSG, SUE, PROTU, GLUCU, KETU, BILU, UROU, YENIFER, LEUKU, GLUKE, EPSU, BACTU, WBCU, RBCU, CASTS, UCRY      Medications Reviewed:     Current Facility-Administered Medications   Medication Dose Route Frequency    methadone (DOLOPHINE) 5 mg/5 mL oral solution 60 mg  60 mg Oral DAILY    diphenhydrAMINE (BENADRYL) capsule 25 mg  25 mg Oral Q6H PRN    sodium chloride (NS) flush 10 mL  10 mL InterCATHeter PRN    alteplase (CATHFLO) 1 mg in sterile water (preservative free) 1 mL injection  1 mg InterCATHeter PRN    vancomycin (VANCOCIN) 1,000 mg in 0.9% sodium chloride (MBP/ADV) 250 mL  1,000 mg IntraVENous Q12H    heparin (porcine) injection 5,000 Units  5,000 Units SubCUTAneous Q12H    sodium chloride (NS) flush 5-40 mL  5-40 mL IntraVENous Q8H    sodium chloride (NS) flush 5-40 mL  5-40 mL IntraVENous PRN    ibuprofen (MOTRIN) tablet 400 mg  400 mg Oral TID    Vancomycin Pharmacy Dosing   Other PRN     ______________________________________________________________________  EXPECTED LENGTH OF STAY: 2d 7h  ACTUAL LENGTH OF STAY:          Matthew Hyde MD   Patient has given Verbal permission to discuss medical care with   persons present in the room and and also with contact as listed on face sheet.

## 2019-03-01 NOTE — ROUTINE PROCESS
Bedside and Verbal shift change report given to Gemma Daniel (oncoming nurse) by Iqra Lanier (offgoing nurse). Report included the following information SBAR.

## 2019-03-02 LAB
ANION GAP SERPL CALC-SCNC: 7 MMOL/L (ref 5–15)
BUN SERPL-MCNC: 22 MG/DL (ref 6–20)
BUN/CREAT SERPL: 24 (ref 12–20)
CALCIUM SERPL-MCNC: 8.6 MG/DL (ref 8.5–10.1)
CHLORIDE SERPL-SCNC: 102 MMOL/L (ref 97–108)
CO2 SERPL-SCNC: 27 MMOL/L (ref 21–32)
CREAT SERPL-MCNC: 0.93 MG/DL (ref 0.55–1.02)
GLUCOSE SERPL-MCNC: 101 MG/DL (ref 65–100)
HBV SURFACE AB SER QL: NONREACTIVE
HBV SURFACE AB SER-ACNC: <3.1 MIU/ML
POTASSIUM SERPL-SCNC: 4.3 MMOL/L (ref 3.5–5.1)
SODIUM SERPL-SCNC: 136 MMOL/L (ref 136–145)

## 2019-03-02 PROCEDURE — 86706 HEP B SURFACE ANTIBODY: CPT

## 2019-03-02 PROCEDURE — 87902 NFCT AGT GNTYP ALYS HEP C: CPT

## 2019-03-02 PROCEDURE — 36415 COLL VENOUS BLD VENIPUNCTURE: CPT

## 2019-03-02 PROCEDURE — 86704 HEP B CORE ANTIBODY TOTAL: CPT

## 2019-03-02 PROCEDURE — 74011250636 HC RX REV CODE- 250/636: Performed by: INTERNAL MEDICINE

## 2019-03-02 PROCEDURE — 65270000029 HC RM PRIVATE

## 2019-03-02 PROCEDURE — 80048 BASIC METABOLIC PNL TOTAL CA: CPT

## 2019-03-02 PROCEDURE — 74011250637 HC RX REV CODE- 250/637: Performed by: HOSPITALIST

## 2019-03-02 RX ADMIN — VANCOMYCIN HYDROCHLORIDE 1000 MG: 1 INJECTION, POWDER, LYOPHILIZED, FOR SOLUTION INTRAVENOUS at 07:33

## 2019-03-02 RX ADMIN — VANCOMYCIN HYDROCHLORIDE 1000 MG: 1 INJECTION, POWDER, LYOPHILIZED, FOR SOLUTION INTRAVENOUS at 19:00

## 2019-03-02 RX ADMIN — METHADONE HYDROCHLORIDE 60 MG: 5 SOLUTION ORAL at 09:07

## 2019-03-02 RX ADMIN — Medication 10 ML: at 05:16

## 2019-03-02 RX ADMIN — IBUPROFEN 400 MG: 400 TABLET ORAL at 16:02

## 2019-03-02 RX ADMIN — IBUPROFEN 400 MG: 400 TABLET ORAL at 20:30

## 2019-03-02 RX ADMIN — Medication 10 ML: at 14:05

## 2019-03-02 RX ADMIN — Medication 10 ML: at 20:30

## 2019-03-02 RX ADMIN — IBUPROFEN 400 MG: 400 TABLET ORAL at 08:52

## 2019-03-02 NOTE — PROGRESS NOTES
Infectious Diseases Progress Note    Antibiotic Summary:  Zosyn  2/19 x 1 dose  Vancomycin  -- present  Rocephin  --     Surgical I&D right middle finger on 2019: POD # 8    Subjective:     No new symptoms. Right middle finger pain is improving    Objective:     Vitals:   Visit Vitals  BP 96/67 (BP 1 Location: Right arm, BP Patient Position: At rest)   Pulse 66   Temp 97.9 °F (36.6 °C)   Resp 18   Ht 5' 4\" (1.626 m)   Wt 56.7 kg (125 lb)   LMP 2018 (Within Months)   SpO2 97%   BMI 21.46 kg/m²        Tmax:  Temp (24hrs), Av.8 °F (36.6 °C), Min:97.7 °F (36.5 °C), Max:97.9 °F (36.6 °C)      Exam:  General appearance: alert, no distress  Lungs: clear to auscultation bilaterally  Heart: regular rate and rhythm  Abdomen: soft, non-tender. Bowel sounds normal.   Right hand: incision on middle finger looks good with no cellulitis and no purulent drainage    IV Lines: Left PICC inserted 2019    Labs:    Recent Labs     19  1030 19  0952 19  0226 19  0103 19  0554   WBC  --   --  5.3  --   --    HGB  --   --  13.1  --   --    PLT  --   --  168  --   --    BUN 15 15  --  16 22*   CREA 0.85 0.83  --  0.88 0.83   TBILI 0.4  --   --   --   --    SGOT 223*  --   --   --   --      --   --   --   --      HIV Ab/Ag = negative  Hep A total Ab = negative  HBsAg = negative  HBsAb = negative  Hep C RNA PCR = positive with 5,670,000 copies  Hep C genotype = pending    Vanc trough  = 14.3    Assessment:     1. MRSA septic flexor tenosynovitis of right middle finger     2. IVDU -- on methadone     3. Chronic Hep C: The Hep C RNA PCR has returned positive. She should be given the Hep A & hep B vaccine series. Acute Hep A or Hep B can be fulminant and life threatening in patients with chronic Hep C. Appreciate evaluation and F/U by Dr. Jessa Cyr:     1. Continue Vancomycin    2.  She needs the [place] vaccine series      Ella Mercado MD

## 2019-03-02 NOTE — PROGRESS NOTES
Pharmacist Note - Vancomycin Dosing  Therapy day 11  Indication: MRSA septic flexor tenosynovitis of right middle finger, s/p I&D; h/o IVDU  Current regimen:  1000 mg IV Q12h    A Trough Level resulted at 12.9 mcg/mL which was obtained 12 hrs post-dose. Goal trough: 10 - 15 mcg/mL     Plan: Continue current regimen. Pharmacy will continue to monitor this patient daily for changes in clinical status and renal function.

## 2019-03-02 NOTE — PROGRESS NOTES
Hospitalist Progress Note  Kathrin Boss MD  Answering service: [phone] OR [phone] from in house phone      Date of Service:  3/2/2019  NAME:  Jewell Vasquez  :  1970  MRN:  [id number]      Admission Summary:   Pt admitted for Right hand pain ans swelling after hitting it      Interval history / Subjective:   Pt seen in follow up of Right Middle Finger Tenosynovitis  She has no new complaints  S/p Debridement   In understanding with the plan for Inpatient stay Dr. Amador Re managing abx   Hep C     Assessment & Plan:     1. Right Middle Finger tenosynovitis - s/p I and D on  - On IV vancomycin and Rocephin , ID consult will be requested. MRSA positive in nares, Prelim Cultures showing MRSA. D/C Rocephin. Treated as skin/soft tissue infection. Appreciate ID consult - c/w IV vancomycin. Risk of interaction of bactrim with methadone (which patient is on)     2. Chronic Hep C and elevated LFT - monitor, Dr. Viki Arteaga consulted    3. Hypokalemia and hypomag - Repleted, recheck in am     4. H.o Opioid Dependence  - on methadone as op  UDS on admission - + amphetamines,Cocaine,methadone,opiates. Code status: Full  DVT prophylaxis: heparin    Risk of deterioration: medium       Care Plan discussed with: Patient/Family and Nurse  Disposition: TBD once finished IV abx      Hospital Problems  Date Reviewed: 2019          Codes Class Noted POA    Infection of hand ICD-10-CM: L08.9  ICD-9-CM: 686.9  2019 Unknown        * (Principal) Cellulitis of hand ICD-10-CM: M94.982  ICD-9-CM: 682.4  2019 Yes                Review of Systems:   Pertinent items are noted in HPI. Vital Signs:    Last 24hrs VS reviewed since prior progress note.  Most recent are:  Visit Vitals  BP 96/64 (BP 1 Location: Right arm, BP Patient Position: At rest)   Pulse 68   Temp 97.6 °F (36.4 °C)   Resp 16   Ht 5' 4\" (1.626 m)   Wt 56.7 kg (125 lb)   SpO2 96% BMI 21.46 kg/m²       No intake or output data in the 24 hours ending 03/02/19 1044     Physical Examination:             Constitutional:  No acute distress, cooperative, pleasant    ENT:  Oral mucous moist, oropharynx benign. Neck supple,    Resp:  CTA bilaterally. No wheezing/rhonchi/rales. No accessory muscle use   CV:  Regular rhythm, normal rate, no murmurs, gallops, rubs    GI:  Soft, non distended, non tender. normoactive bowel sounds, no hepatosplenomegaly     Musculoskeletal:  No edema, warm, 2+ pulses throughout, Right Arm is bandaged. able to wiggle fingers,Non Cellulitic arm proximally      Neurologic:  Moves all extremities. AAOx3, CN II-XII reviewed     Psych:  Good insight, Not anxious nor agitated. Data Review:    Review and/or order of clinical lab test  Review and/or order of tests in the radiology section of [facility]      Labs:     Recent Labs     03/01/19  0226   WBC 5.3   HGB 13.1   HCT 39.3        Recent Labs     03/02/19  0251 03/01/19  1030 03/01/19  0952    139 140   K 4.3 3.7 3.7    103 103   CO2 27 28 27   BUN 22* 15 15   CREA 0.93 0.85 0.83   * 123* 128*   CA 8.6 8.7 8.9     Recent Labs     03/01/19  1030   SGOT 223*   *      TBILI 0.4   TP 7.4   ALB 3.5   GLOB 3.9     No results for input(s): INR, PTP, APTT in the last 72 hours. No lab exists for component: INREXT, INREXT   No results for input(s): FE, TIBC, PSAT, FERR in the last 72 hours. No results found for: FOL, RBCF   No results for input(s): PH, PCO2, PO2 in the last 72 hours. No results for input(s): CPK, CKNDX, TROIQ in the last 72 hours.     No lab exists for component: CPKMB  No results found for: CHOL, CHOLX, CHLST, CHOLV, HDL, LDL, LDLC, DLDLP, TGLX, TRIGL, TRIGP, CHHD, CHHDX  Lab Results   Component Value Date/Time    Glucose (POC) 130 (H) 02/24/2019 09:31 AM     No results found for: COLOR, APPRN, SPGRU, REFSG, USE, PROTU, GLUCU, Chuck Scarce, BILU, UROU, YENIFER, LEUKU, GLUKE, EPSU, BACTU, WBCU, RBCU, CASTS, UCRY      Medications Reviewed:     Current Facility-Administered Medications   Medication Dose Route Frequency    methadone (DOLOPHINE) 5 mg/5 mL oral solution 60 mg  60 mg Oral DAILY    diphenhydrAMINE (BENADRYL) capsule 25 mg  25 mg Oral Q6H PRN    sodium chloride (NS) flush 10 mL  10 mL InterCATHeter PRN    alteplase (CATHFLO) 1 mg in sterile water (preservative free) 1 mL injection  1 mg InterCATHeter PRN    vancomycin (VANCOCIN) 1,000 mg in 0.9% sodium chloride (MBP/ADV) 250 mL  1,000 mg IntraVENous Q12H    heparin (porcine) injection 5,000 Units  5,000 Units SubCUTAneous Q12H    sodium chloride (NS) flush 5-40 mL  5-40 mL IntraVENous Q8H    sodium chloride (NS) flush 5-40 mL  5-40 mL IntraVENous PRN    ibuprofen (MOTRIN) tablet 400 mg  400 mg Oral TID    Vancomycin Pharmacy Dosing   Other PRN     ______________________________________________________________________  EXPECTED LENGTH OF STAY: 2d 7h  ACTUAL LENGTH OF STAY:          [address] [place] [address], MD   Patient has given Verbal permission to discuss medical care with   persons present in the room and and also with contact as listed on face sheet.

## 2019-03-03 LAB — HBV CORE AB SERPL QL IA: NEGATIVE

## 2019-03-03 PROCEDURE — 74011250636 HC RX REV CODE- 250/636: Performed by: INTERNAL MEDICINE

## 2019-03-03 PROCEDURE — 74011250637 HC RX REV CODE- 250/637: Performed by: HOSPITALIST

## 2019-03-03 PROCEDURE — 65270000029 HC RM PRIVATE

## 2019-03-03 RX ADMIN — VANCOMYCIN HYDROCHLORIDE 1000 MG: 1 INJECTION, POWDER, LYOPHILIZED, FOR SOLUTION INTRAVENOUS at 07:27

## 2019-03-03 RX ADMIN — IBUPROFEN 400 MG: 400 TABLET ORAL at 21:27

## 2019-03-03 RX ADMIN — IBUPROFEN 400 MG: 400 TABLET ORAL at 08:45

## 2019-03-03 RX ADMIN — Medication 10 ML: at 21:27

## 2019-03-03 RX ADMIN — METHADONE HYDROCHLORIDE 60 MG: 5 SOLUTION ORAL at 10:34

## 2019-03-03 RX ADMIN — VANCOMYCIN HYDROCHLORIDE 1000 MG: 1 INJECTION, POWDER, LYOPHILIZED, FOR SOLUTION INTRAVENOUS at 19:27

## 2019-03-03 RX ADMIN — Medication 10 ML: at 07:27

## 2019-03-03 RX ADMIN — IBUPROFEN 400 MG: 400 TABLET ORAL at 15:13

## 2019-03-03 RX ADMIN — Medication 10 ML: at 15:13

## 2019-03-03 NOTE — PROGRESS NOTES
Infectious Diseases Progress Note    Antibiotic Summary:  Zosyn  2/19 x 1 dose  Vancomycin  -- present  Rocephin  --     Surgical I&D right middle finger on 2019: POD # 10    Subjective:     No new symptoms. Right middle finger pain decreased and ROM increased    Objective:     Vitals:   Visit Vitals  BP 98/63 (BP 1 Location: Right arm, BP Patient Position: Sitting)   Pulse 67   Temp 97.6 °F (36.4 °C)   Resp 16   Ht 5' 4\" (1.626 m)   Wt 56.7 kg (125 lb)   LMP 2018 (Within Months)   SpO2 96%   BMI 21.46 kg/m²        Tmax:  Temp (24hrs), Av.5 °F (36.4 °C), Min:97.2 °F (36.2 °C), Max:97.6 °F (36.4 °C)      Exam:  General appearance: alert, no distress  Lungs: clear to auscultation bilaterally  Heart: regular rate and rhythm  Abdomen: soft, non-tender. Bowel sounds normal.   Right hand: incision on middle finger looks good with no cellulitis and no purulent drainage; ROM increased    IV Lines: Left PICC inserted 2019    Labs:    Recent Labs     19  0251 19  1030 19  0952 19  0226 19  0103   WBC  --   --   --  5.3  --    HGB  --   --   --  13.1  --    PLT  --   --   --  168  --    BUN 22* 15 15  --  16   CREA 0.93 0.85 0.83  --  0.88   TBILI  --  0.4  --   --   --    SGOT  --  223*  --   --   --    AP  --  117  --   --   --      HIV Ab/Ag = negative  Hep A total Ab = negative  HBsAg = negative  HBsAb = negative  Hep C RNA PCR = positive with 5,670,000 copies  Hep C genotype = pending    Vanc trough  = 14.3    Assessment:     1. MRSA septic flexor tenosynovitis of right middle finger     2. IVDU -- on methadone     3. Chronic Hep C: The Hep C RNA PCR has returned positive. She should be given the Hep A & hep B vaccine series. Acute Hep A or Hep B can be fulminant and life threatening in patients with chronic Hep C. Appreciate evaluation and F/U by Dr. Aditi Sanon:     1. Continue Vancomycin    2.  She needs the Hep A & Hep B vaccine series      Dariela Lopez Alhaji Freed MD

## 2019-03-03 NOTE — PROGRESS NOTES
Hospitalist Progress Note  Jluis Dwyer MD  Answering service: [phone] OR [phone] from in house phone      Date of Service:  3/3/2019  NAME:  Scarlet Saba  :  1970  MRN:  [id number]      Admission Summary:   Pt admitted for Right hand pain ans swelling after hitting it      Interval history / Subjective:   Pt seen in follow up of Right Middle Finger Tenosynovitis  She has no new complaints  S/p Debridement   In understanding with the plan for Inpatient stay Dr. Gentry Dubin managing abx   Hep C patient is feeling ok     Assessment & Plan:     1. Right Middle Finger tenosynovitis - s/p I and D on  - On IV vancomycin and Rocephin , ID consult will be requested. MRSA positive in nares, Prelim Cultures showing MRSA. D/C Rocephin. Treated as skin/soft tissue infection. Appreciate ID consult - c/w IV vancomycin. Risk of interaction of bactrim with methadone (which patient is on)     2. Chronic Hep C and elevated LFT - monitor, Dr. Garcia Coy consulted    3. Hypokalemia and hypomag - Repleted, recheck in am     4. H.o Opioid Dependence  - on methadone as op  UDS on admission - + amphetamines,Cocaine,methadone,opiates. Code status: Full  DVT prophylaxis: heparin    Risk of deterioration: medium       Care Plan discussed with: Patient/Family and Nurse  Disposition: TBD once finished IV abx      Hospital Problems  Date Reviewed: 2019          Codes Class Noted POA    Infection of hand ICD-10-CM: L08.9  ICD-9-CM: 686.9  2019 Unknown        * (Principal) Cellulitis of hand ICD-10-CM: A72.652  ICD-9-CM: 682.4  2019 Yes                Review of Systems:   Pertinent items are noted in HPI. Vital Signs:    Last 24hrs VS reviewed since prior progress note.  Most recent are:  Visit Vitals  BP 93/64 (BP 1 Location: Right arm, BP Patient Position: Sitting)   Pulse 74   Temp 98 °F (36.7 °C)   Resp 16   Ht 5' 4\" (1.626 m)   Wt 56.7 kg (125 lb)   SpO2 97%   BMI 21.46 kg/m²         Intake/Output Summary (Last 24 hours) at 3/3/2019 8871  Last data filed at 3/3/2019 0846  Gross per 24 hour   Intake 300 ml   Output --   Net 300 ml        Physical Examination:             Constitutional:  No acute distress, cooperative, pleasant    ENT:  Oral mucous moist, oropharynx benign. Neck supple,    Resp:  CTA bilaterally. No wheezing/rhonchi/rales. No accessory muscle use   CV:  Regular rhythm, normal rate, no murmurs, gallops, rubs    GI:  Soft, non distended, non tender. normoactive bowel sounds, no hepatosplenomegaly     Musculoskeletal:  No edema, warm, 2+ pulses throughout, Right Arm is bandaged. able to wiggle fingers,Non Cellulitic arm proximally      Neurologic:  Moves all extremities. AAOx3, CN II-XII reviewed     Psych:  Good insight, Not anxious nor agitated. Data Review:    Review and/or order of clinical lab test  Review and/or order of tests in the radiology section of [facility]      Labs:     Recent Labs     03/01/19  0226   WBC 5.3   HGB 13.1   HCT 39.3        Recent Labs     03/02/19  0251 03/01/19  1030 03/01/19  0952    139 140   K 4.3 3.7 3.7    103 103   CO2 27 28 27   BUN 22* 15 15   CREA 0.93 0.85 0.83   * 123* 128*   CA 8.6 8.7 8.9     Recent Labs     03/01/19  1030   SGOT 223*   *      TBILI 0.4   TP 7.4   ALB 3.5   GLOB 3.9     No results for input(s): INR, PTP, APTT in the last 72 hours. No lab exists for component: INREXT, INREXT   No results for input(s): FE, TIBC, PSAT, FERR in the last 72 hours. No results found for: FOL, RBCF   No results for input(s): PH, PCO2, PO2 in the last 72 hours. No results for input(s): CPK, CKNDX, TROIQ in the last 72 hours.     No lab exists for component: CPKMB  No results found for: CHOL, CHOLX, CHLST, CHOLV, HDL, LDL, LDLC, DLDLP, TGLX, TRIGL, TRIGP, CHHD, CHHDX  Lab Results   Component Value Date/Time    Glucose (POC) 130 (H) 02/24/2019 09:31 AM     No results found for: COLOR, APPRN, SPGRU, REFSG, SUE, PROTU, GLUCU, KETU, BILU, UROU, YENIFER, LEUKU, GLUKE, EPSU, BACTU, WBCU, RBCU, CASTS, UCRY      Medications Reviewed:     Current Facility-Administered Medications   Medication Dose Route Frequency    methadone (DOLOPHINE) 5 mg/5 mL oral solution 60 mg  60 mg Oral DAILY    diphenhydrAMINE (BENADRYL) capsule 25 mg  25 mg Oral Q6H PRN    sodium chloride (NS) flush 10 mL  10 mL InterCATHeter PRN    alteplase (CATHFLO) 1 mg in sterile water (preservative free) 1 mL injection  1 mg InterCATHeter PRN    vancomycin (VANCOCIN) 1,000 mg in 0.9% sodium chloride (MBP/ADV) 250 mL  1,000 mg IntraVENous Q12H    heparin (porcine) injection 5,000 Units  5,000 Units SubCUTAneous Q12H    sodium chloride (NS) flush 5-40 mL  5-40 mL IntraVENous Q8H    sodium chloride (NS) flush 5-40 mL  5-40 mL IntraVENous PRN    ibuprofen (MOTRIN) tablet 400 mg  400 mg Oral TID    Vancomycin Pharmacy Dosing   Other PRN     ______________________________________________________________________  EXPECTED LENGTH OF STAY: 2d 7h  ACTUAL LENGTH OF STAY:          MD Asya   Patient has given Verbal permission to discuss medical care with   persons present in the room and and also with contact as listed on face sheet.

## 2019-03-03 NOTE — PROGRESS NOTES
ORTHO PROGRESS NOTE    March 3, 2019  Admit Date:   2019    Post Op day: 11 Days Post-Op    Subjective:    Berenice Sanon states that she is feeling well overall. Hand pain improving. Range improving. Remains on Abx per ID. No new complaints.   Tolerating diet  Denies N/V/SOB or CP     PT/OT:   Gait:                    Vital Signs:    Patient Vitals for the past 8 hrs:   BP Temp Pulse Resp SpO2   19 0806 93/64 98 °F (36.7 °C) 74 16 97 %   19 0305 99/61 97.9 °F (36.6 °C) 62 18 96 %     Temp (24hrs), Av.7 °F (36.5 °C), Min:97.2 °F (36.2 °C), Max:98 °F (36.7 °C)      Pain Control:   Pain Assessment  Pain Scale 1: Numeric (0 - 10)  Pain Intensity 1: 0  Pain Location 1: Finger (comment which one)  Pain Orientation 1: Right  Pain Description 1: Throbbing  Pain Intervention(s) 1: Other (comment)(denies pain and/or pain management needs)    Meds:    Current Facility-Administered Medications   Medication Dose Route Frequency    methadone (DOLOPHINE) 5 mg/5 mL oral solution 60 mg  60 mg Oral DAILY    diphenhydrAMINE (BENADRYL) capsule 25 mg  25 mg Oral Q6H PRN    sodium chloride (NS) flush 10 mL  10 mL InterCATHeter PRN    alteplase (CATHFLO) 1 mg in sterile water (preservative free) 1 mL injection  1 mg InterCATHeter PRN    vancomycin (VANCOCIN) 1,000 mg in 0.9% sodium chloride (MBP/ADV) 250 mL  1,000 mg IntraVENous Q12H    heparin (porcine) injection 5,000 Units  5,000 Units SubCUTAneous Q12H    sodium chloride (NS) flush 5-40 mL  5-40 mL IntraVENous Q8H    sodium chloride (NS) flush 5-40 mL  5-40 mL IntraVENous PRN    ibuprofen (MOTRIN) tablet 400 mg  400 mg Oral TID    Vancomycin Pharmacy Dosing   Other PRN       LAB:    Recent Labs     19  0226   HCT 39.3   HGB 13.1       Transfuse PRBC's:      Assessment & Physician's Comment:  Dressing is clean, dry, and intact  Neurovascular checks within normal limits  Orientation:  Oriented    Principal Problem:    Cellulitis of hand (2/19/2019)    Active Problems:    Infection of hand (2/22/2019)        Plan:    Cont aggressive hand ROM exercises  Twice daily low bulk dressing changes  Sutures to be removed Monday with soft dressing placed again  Abx per ID  Medical management per primary team    Maria Isabel Mcelroy, PA-C   Orthopedic Trauma Service  [facility]

## 2019-03-04 LAB
ANION GAP SERPL CALC-SCNC: 7 MMOL/L (ref 5–15)
BUN SERPL-MCNC: 15 MG/DL (ref 6–20)
BUN/CREAT SERPL: 18 (ref 12–20)
CALCIUM SERPL-MCNC: 8.9 MG/DL (ref 8.5–10.1)
CHLORIDE SERPL-SCNC: 105 MMOL/L (ref 97–108)
CO2 SERPL-SCNC: 26 MMOL/L (ref 21–32)
CREAT SERPL-MCNC: 0.85 MG/DL (ref 0.55–1.02)
GLUCOSE SERPL-MCNC: 85 MG/DL (ref 65–100)
POTASSIUM SERPL-SCNC: 4.1 MMOL/L (ref 3.5–5.1)
SODIUM SERPL-SCNC: 138 MMOL/L (ref 136–145)

## 2019-03-04 PROCEDURE — 97110 THERAPEUTIC EXERCISES: CPT

## 2019-03-04 PROCEDURE — 74011250637 HC RX REV CODE- 250/637: Performed by: HOSPITALIST

## 2019-03-04 PROCEDURE — 74011250636 HC RX REV CODE- 250/636: Performed by: INTERNAL MEDICINE

## 2019-03-04 PROCEDURE — 36415 COLL VENOUS BLD VENIPUNCTURE: CPT

## 2019-03-04 PROCEDURE — 65270000029 HC RM PRIVATE

## 2019-03-04 PROCEDURE — 80048 BASIC METABOLIC PNL TOTAL CA: CPT

## 2019-03-04 RX ADMIN — IBUPROFEN 400 MG: 400 TABLET ORAL at 17:02

## 2019-03-04 RX ADMIN — VANCOMYCIN HYDROCHLORIDE 1000 MG: 1 INJECTION, POWDER, LYOPHILIZED, FOR SOLUTION INTRAVENOUS at 07:12

## 2019-03-04 RX ADMIN — METHADONE HYDROCHLORIDE 60 MG: 5 SOLUTION ORAL at 09:47

## 2019-03-04 RX ADMIN — VANCOMYCIN HYDROCHLORIDE 1000 MG: 1 INJECTION, POWDER, LYOPHILIZED, FOR SOLUTION INTRAVENOUS at 19:09

## 2019-03-04 RX ADMIN — Medication 10 ML: at 07:12

## 2019-03-04 RX ADMIN — Medication 10 ML: at 14:00

## 2019-03-04 RX ADMIN — IBUPROFEN 400 MG: 400 TABLET ORAL at 21:33

## 2019-03-04 RX ADMIN — IBUPROFEN 400 MG: 400 TABLET ORAL at 09:47

## 2019-03-04 NOTE — PROGRESS NOTES
Ortho:  Denies pain. Swelling/AROM improving. T 97.9  Right hand middle finger incision healed with sutures intact. Flexion improving but DIP motion still limited. CR brisk. Imp:  POD #12 I&D right middle finger    Plan:  1. I removed sutures, no bleeding. I reapplied small dressing . Nurses can remove after lunch and keep dressing off if no bleeding/drainage. OK to wash hand. 2. Antibiotics per ID. 3. Cont. AROM on her own and OT.     Kathy Rodriguez, PA

## 2019-03-04 NOTE — PROGRESS NOTES
Infectious Diseases Progress Note    Antibiotic Summary:  Zosyn  2/19 x 1 dose  Vancomycin  -- present  Rocephin  --     Surgical I&D right middle finger on 2019: POD # 11    Subjective:     Finger improving with less pain and increased ROM    Objective:     Vitals:   Visit Vitals  /67 (BP 1 Location: Right arm, BP Patient Position: At rest)   Pulse 60   Temp 97.7 °F (36.5 °C)   Resp 18   Ht 5' 4\" (1.626 m)   Wt 56.7 kg (125 lb)   LMP 2018 (Within Months)   SpO2 97%   BMI 21.46 kg/m²        Tmax:  Temp (24hrs), Av.8 °F (36.6 °C), Min:97.6 °F (36.4 °C), Max:98 °F (36.7 °C)      Exam:  General appearance: alert, no distress  Lungs: clear to auscultation bilaterally  Heart: regular rate and rhythm  Abdomen: soft, non-tender. Bowel sounds normal.   Right hand: incision on middle finger looks good with no cellulitis and no purulent drainage; ROM increased    IV Lines: Left PICC inserted 2019    Labs:    Recent Labs     19  0251 19  1030 19  0952 19  0226   WBC  --   --   --  5.3   HGB  --   --   --  13.1   PLT  --   --   --  168   BUN 22* 15 15  --    CREA 0.93 0.85 0.83  --    TBILI  --  0.4  --   --    SGOT  --  223*  --   --    AP  --  117  --   --      HIV Ab/Ag = negative  Hep A total Ab = negative  HBsAg = negative  HBsAb = negative  Hep C RNA PCR = positive with 5,670,000 copies  Hep C genotype = pending    Vanc trough  = 14.3    Assessment:     1. MRSA septic flexor tenosynovitis of right middle finger     2. IVDU -- on methadone     3. Chronic Hep C: The Hep C RNA PCR has returned positive. She should be given the Hep A & hep B vaccine series. Acute Hep A or Hep B can be fulminant and life threatening in patients with chronic Hep C. Appreciate evaluation and F/U by Dr. Lucius Simon:     1. Continue Vancomycin    2.  She needs the [place] vaccine series      Melony Brown MD

## 2019-03-04 NOTE — PROGRESS NOTES
Problem: Self Care Deficits Care Plan (Adult)  Goal: *Acute Goals and Plan of Care (Insert Text)  1. Patient will perform R hand/ finger exercises independently within 7 days. MET 2/27/19; continue goal as new exercises added 3/4/19  2. Patient will achieve full AROM of R hand 3rd digit with all joints in all planes within 7 days. Continue goal 3/4/19   Occupational Therapy TREATMENT: WEEKLY REASSESSMENT  Patient: Chikis Mathis (89 y.o. female)  Date: 3/4/2019  Diagnosis: Cellulitis of hand [N49.400]  Infection of hand [L08.9] Cellulitis of hand  Procedure(s) (LRB):  INCISION AND DRAINAGE RIGHT MIDDLE FINGER (Right) 12 Days Post-Op  Precautions:    Chart, occupational therapy assessment, plan of care, and goals were reviewed. ASSESSMENT:  Patient received in bathroom completing grooming. MD has removed the dressing and stitches from right third finger this date and patient reports she can leave it open as long as it does not ooze. Patient demonstrating decreased active and passive MP extension and decreased finger flexion at all joints of right third finger. Patient is able to demonstrate all assigned exercises independently, but has been limited by the dressings on the finger. Agreed on adding isometric extension exercise for MP joint,  passive stretch at each joint without pain, and active extension exercise of MP joint with hand supported on tabletop. Recommended careful movement today with removal of stitches. Returned later with a goniometer to measure third finger mobility, but patient reports some oozing at base of finger and finger is now redressed. Patient reports inclusion of right hand in functional activities to increase use and mobility. Patient progressing well within limits of dressings and new removal of stitches. Will follow to measure finger mobility as able with dressings.    Progression toward goals:  [x]            Improving appropriately and progressing toward goals  [] Improving slowly and progressing toward goals  []            Not making progress toward goals and plan of care will be adjusted     PLAN:  Goals have been updated based on progression since last assessment. Patient continues to benefit from skilled intervention to address the above impairments. Continue to follow patient 2 times a week to address goals. Planned Interventions:  []                    Self Care Training                  []             Therapeutic Activities  []                    Functional Mobility Training    []             Cognitive Retraining  [x]                    Therapeutic Exercises           []             Endurance Activities  []                    Balance Training                   []             Neuromuscular Re-Education  []                    Visual/Perceptual Training     []        Home Safety Training  []                    Patient Education                 []             Family Training/Education  []                    Other (comment):  Discharge Recommendations: Outpatient Hand Therapy  Further Equipment Recommendations for Discharge: none     SUBJECTIVE:   Patient stated It was oozing a little at the base earlier.     OBJECTIVE DATA SUMMARY:   Cognitive/Behavioral Status:  Neurologic State: Alert  Orientation Level: Oriented X4  Cognition: Appropriate decision making; Appropriate safety awareness; Follows commands  Perception: Appears intact  Perseveration: No perseveration noted  Safety/Judgement: Awareness of environment    Functional Mobility and Transfers for ADLs:  ADL Intervention:       Grooming  Grooming Assistance: Modified independent    Cognitive Retraining  Safety/Judgement: Awareness of environment       Therapeutic Exercises:   See above     Activity Tolerance:   Good  Please refer to the flowsheet for vital signs taken during this treatment.   After treatment:   [] Patient left in no apparent distress sitting up in chair  [x] Patient left in no apparent distress in bed  [x] Call bell left within reach  [x] Nursing notified  [] Caregiver present  [] Bed alarm activated    COMMUNICATION/COLLABORATION:   The patients plan of care was discussed with: Registered Nurse, Occupational Therapist    TRIP Goldman  Time Calculation: 10 mins

## 2019-03-04 NOTE — PROGRESS NOTES
Hospitalist Progress Note  Meche Cardenas MD  Answering service: [phone] OR [phone] from in house phone      Date of Service:  3/4/2019  NAME:  Beth Mcfarlane  :  1970  MRN:  [id number]      Admission Summary:   Pt admitted for Right hand pain ans swelling after hitting it      Interval history / Subjective:   Pt seen in follow up of Right Middle Finger Tenosynovitis  She has no new complaints  S/p Debridement   In understanding with the plan for Inpatient stay Dr. Monica Parker managing abx   Hep C patient is feeling ok    3/4  Stitches removed on Vancomycin for 2 more days. Hopefully discharge on Wednesday if [id number] [address] with ID. Assessment & Plan:     1. Right Middle Finger tenosynovitis - s/p I and D on  - On IV vancomycin and Rocephin , ID consult will be requested. MRSA positive in nares, Prelim Cultures showing MRSA. D/C Rocephin. Treated as skin/soft tissue infection. Appreciate ID consult - c/w IV vancomycin. Risk of interaction of bactrim with methadone (which patient is on)     2. Chronic Hep C and elevated LFT - monitor, Dr. Maria Elena Becerril consulted    3. Hypokalemia and hypomag - Repleted, recheck in am     4. H.o Opioid Dependence  - on methadone as op  UDS on admission - + amphetamines,Cocaine,methadone,opiates. Code status: Full  DVT prophylaxis: heparin    Risk of deterioration: medium       Care Plan discussed with: Patient/Family and Nurse  Disposition: TBD once finished IV abx      Hospital Problems  Date Reviewed: 2019          Codes Class Noted POA    Infection of hand ICD-10-CM: L08.9  ICD-9-CM: 686.9  2019 Unknown        * (Principal) Cellulitis of hand ICD-10-CM: H87.650  ICD-9-CM: 682.4  2019 Yes                Review of Systems:   Pertinent items are noted in HPI. Vital Signs:    Last 24hrs VS reviewed since prior progress note.  Most recent are:  Visit Vitals  BP 92/57 (BP 1 Location: Right arm, BP Patient Position: At rest)   Pulse 70   Temp 97.9 °F (36.6 °C)   Resp 18   Ht 5' 4\" (1.626 m)   Wt 56.7 kg (125 lb)   SpO2 98%   BMI 21.46 kg/m²         Intake/Output Summary (Last 24 hours) at 3/4/2019 1030  Last data filed at 3/3/2019 2152  Gross per 24 hour   Intake 4000 ml   Output --   Net 4000 ml        Physical Examination:             Constitutional:  No acute distress, cooperative, pleasant    ENT:  Oral mucous moist, oropharynx benign. Neck supple,    Resp:  CTA bilaterally. No wheezing/rhonchi/rales. No accessory muscle use   CV:  Regular rhythm, normal rate, no murmurs, gallops, rubs    GI:  Soft, non distended, non tender. normoactive bowel sounds, no hepatosplenomegaly     Musculoskeletal:  No edema, warm, 2+ pulses throughout, Right Arm is bandaged. able to wiggle fingers,Non Cellulitic arm proximally      Neurologic:  Moves all extremities. AAOx3, CN II-XII reviewed     Psych:  Good insight, Not anxious nor agitated. Data Review:    Review and/or order of clinical lab test  Review and/or order of tests in the radiology section of CPT      Labs:     No results for input(s): WBC, HGB, HCT, PLT, HGBEXT, HCTEXT, PLTEXT, HGBEXT, HCTEXT, PLTEXT in the last 72 hours. Recent Labs     03/04/19  0351 03/02/19  0251    136   K 4.1 4.3    102   CO2 26 27   BUN 15 22*   CREA 0.85 0.93   GLU 85 101*   CA 8.9 8.6     No results for input(s): SGOT, GPT, ALT, AP, TBIL, TBILI, TP, ALB, GLOB, GGT, AML, LPSE in the last 72 hours. No lab exists for component: AMYP, HLPSE  No results for input(s): INR, PTP, APTT in the last 72 hours. No lab exists for component: INREXT, INREXT   No results for input(s): FE, TIBC, PSAT, FERR in the last 72 hours. No results found for: FOL, RBCF   No results for input(s): PH, PCO2, PO2 in the last 72 hours. No results for input(s): CPK, CKNDX, TROIQ in the last 72 hours.     No lab exists for component: CPKMB  No results found for: CHOL, CHOLX, CHLST, CHOLV, HDL, LDL, LDLC, DLDLP, TGLX, TRIGL, TRIGP, CHHD, CHHDX  Lab Results   Component Value Date/Time    Glucose (POC) 130 (H) 02/24/2019 09:31 AM     No results found for: COLOR, APPRN, SPGRU, REFSG, SUE, PROTU, GLUCU, KETU, BILU, UROU, YENIFER, LEUKU, GLUKE, EPSU, BACTU, WBCU, RBCU, CASTS, UCRY      Medications Reviewed:     Current Facility-Administered Medications   Medication Dose Route Frequency    methadone (DOLOPHINE) 5 mg/5 mL oral solution 60 mg  60 mg Oral DAILY    diphenhydrAMINE (BENADRYL) capsule 25 mg  25 mg Oral Q6H PRN    sodium chloride (NS) flush 10 mL  10 mL InterCATHeter PRN    alteplase (CATHFLO) 1 mg in sterile water (preservative free) 1 mL injection  1 mg InterCATHeter PRN    vancomycin (VANCOCIN) 1,000 mg in 0.9% sodium chloride (MBP/ADV) 250 mL  1,000 mg IntraVENous Q12H    heparin (porcine) injection 5,000 Units  5,000 Units SubCUTAneous Q12H    sodium chloride (NS) flush 5-40 mL  5-40 mL IntraVENous Q8H    sodium chloride (NS) flush 5-40 mL  5-40 mL IntraVENous PRN    ibuprofen (MOTRIN) tablet 400 mg  400 mg Oral TID    Vancomycin Pharmacy Dosing   Other PRN     ______________________________________________________________________  EXPECTED LENGTH OF STAY: 2d 7h  ACTUAL LENGTH OF STAY:          [facility], [place]   Patient has given Verbal permission to discuss medical care with   persons present in the room and and also with contact as listed on face sheet.

## 2019-03-04 NOTE — PROGRESS NOTES
Problem: Pressure Injury - Risk of  Goal: *Prevention of pressure injury  Document Manuel Scale and appropriate interventions in the flowsheet.   Outcome: Progressing Towards Goal  Pressure Injury Interventions:  Sensory Interventions: Assess changes in LOC, Assess need for specialty bed    Moisture Interventions: Maintain skin hydration (lotion/cream)    Activity Interventions: Pressure redistribution bed/mattress(bed type)    Mobility Interventions: HOB 30 degrees or less    Nutrition Interventions: Document food/fluid/supplement intake    Friction and Shear Interventions: HOB 30 degrees or less

## 2019-03-04 NOTE — ADT AUTH CERT NOTES
- 3/1     Patient Demographics     Patient Name  Theresa Almonte  [id number] Sex  Female   1970 Address  [address] [address] Phone  [phone] (Home)  [phone] (Mobile) *Preferred*   CSN:   [id number]   [facility] Date: Admit Time Room Bed   2019 10:48  [[id number]] 01 [[id number]]   Attending Providers     Provider Pager From To   Jose Miguel Ng MD  19   Lul Diaz MD  19   Constanec Weiner MD  19   Veronica Sandhu MD  19   Mickey James MD  19   Constance Weiner MD  19    Emergency Contact(s)     Name Relation Home Work Mobile   NONE,NONE Other Relative   [phone]   Utilization Reviews        Cellulitis - Care Day 11 (3/1/2019) by Rosa Bennett RN        Review Status Review Entered   Completed 3/1/2019 17:15      Criteria Review      Care Day: 11 Care Date: 3/1/2019 Level of Care: Inpatient Floor   Guideline Day 3    Level Of Care   (X) Floor to discharge[D]      Clinical Status   (X) * Hemodynamic stability   3/1/2019 17:15:14 EST by Tata Quiroz     VS 81.2-41-34-04/75;  O2 sat 97%      (X) * Fever absent or improved   ( ) * Skin exam stable or improved   (X) * Mental status at baseline   ( ) * Antibiotic treatment needs appropriate for next level of care   (X) * Pain absent or manageable at next level of care   3/1/2019 17:15:14 EST by Tata Quiroz     pain under control; dolophine 60mg po q day      ( ) * Discharge plans and education understood      Activity   ( ) * Ambulatory      Routes   (X) * Oral hydration, medications,[E]and diet   3/1/2019 17:15:14 EST by Tata Quiroz     regular diet         Interventions   (X) WBC   3/1/2019 17:15:14 EST by Tata Quiroz     5.3         Medications   (X) Parenteral or oral antibiotics[A]      3/1/2019 17:15:14 EST by Tata Quiroz   Subject: Additional Clinical Information   Pt seen in follow up of Right Middle Finger Tenosynovitis  S/p Debridement She reports pain under control Per request methadone dose adjusted  In understanding with the plan for Inpatient stay Dr. Lydia Ireland as Hep C as well consulted hepatology       Physical Exam:   No edema, warm, 2+ pulses throughout, Right Arm is bandaged. able to wiggle fingers,Non Cellulitic arm proximally      A/P:      Right Middle Finger tenosynovitis - s/p I and D on 2/20 - On IV vancomycin and Rocephin , ID consult will be requested. MRSA positive in nares, Prelim Cultures showing MRSA. D/C Rocephin. Treated as skin/soft tissue infection. Appreciate ID consult - c/w IV vancomycin. Risk of interaction of bactrim with methadone (which patient is on)            Chronic Hep C and elevated LFT - monitor, Dr. Lucius Velazquez consulted     Hypokalemia and hypomag - Repleted, recheck in am      H.o Opioid Dependence   - on methadone as op   UDS on admission - + amphetamines,Cocaine,methadone,opiates.       no imaging   additional orders:   hep SQ q 8hrs                     * Milestone         Cellulitis - Care Day 10 (2/28/2019) by Yolande Stoddard RN        Review Status Review Entered   Completed 3/1/2019 17:09      Criteria Review      Care Day: 10 Care Date: 2/28/2019 Level of Care: Inpatient Floor   Guideline Day 3    Level Of Care   (X) Floor to discharge[D]      Clinical Status   (X) * Hemodynamic stability   3/1/2019 17:09:50 EST by Fernando Roca     VS 10.8-35-/23;  O2 sat 97% RA      (X) * Fever absent or improved   ( ) * Skin exam stable or improved   (X) * Mental status at baseline   ( ) * Antibiotic treatment needs appropriate for next level of care   (X) * Pain absent or manageable at next level of care   3/1/2019 17:09:50 EST by Fernando Roca     pain under control;  dolophine 60mg po q day      ( ) * Discharge plans and education understood      Activity   ( ) * Ambulatory      Routes   (X) * Oral hydration, medications,[E]and diet 3/1/2019 17:09:50 EST by Ryanne Bautista     regular diet         Medications   (X) Parenteral or oral antibiotics[A]   3/1/2019 17:09:50 EST by Ryanne Bautista     vanco 1,000 mg IV q 12hrs         3/1/2019 17:09:50 EST by Ryanne Bautista   Subject: Additional Clinical Information      Ortho:      Some swelling yesterday she attributes to increased sodium in diet. No pain. Wants dressing off couple times daily to facilitate AROM.         Afebrile. Right hand middle finger incision healing. No maceration, drainage, erythema. Some swelling. Limited flexion.   Able to get just shy full extension. CR brisk.              Imp:      POD #8 s/p I&D right hand middle finger tenosynovitis, MRSA               Plan:      1. Daily dressing. OK to remove for AROM and nurses can redress. 2. IV abx per ID.      3. Elevate to control swelling. Infectious Diseases Progress Note      Feels better each day      Right hand: incision on middle finger looks good with no cellulitis and no purulent drainage            Assessment:      1.  MRSA septic flexor tenosynovitis of right middle finger              2. IVDU -- on methadone              3. Chronic Hep C: The Hep C RNA PCR has returned positive. She needs referral to Hepatology for evaluation and treatment. She should be given the Hep A & hep B vaccine series. Acute Hep A or Hep B can be fulminant and life threatening in patients with chronic Hep C      Plan:      1. Continue Vancomycin              2. Referral to Hepatology for evaluation and treatment of chronic Hep C              3. She needs the Hep A & Hep B vaccine series            IM Progress Note:      S/p Debridement She reports pain under control       Per request methadone dose adjusted      Physical Exam:      No edema, warm, 2+ pulses throughout, Right Arm is bandaged.  able to wiggle fingers,Non Cellulitic arm proximally         A/P:      Right Middle Finger tenosynovitis - s/p I and D on 2/20 - On IV vancomycin and Rocephin , ID consult will be requested. MRSA positive in nares, Prelim Cultures showing MRSA. D/C Rocephin. Treated as skin/soft tissue infection. Appreciate ID consult - c/w IV vancomycin. Risk of interaction of bactrim with methadone (which patient is on)                  Chronic Hep C and elevated LFT - monitor, Hepatitis panel sent           Hypokalemia and hypomag - Repleted, recheck in am            H.o Opioid Dependence   - on methadone as op      UDS on admission - + amphetamines,Cocaine,methadone,opiates. * Milestone         Cellulitis - Care Day 9 (2/27/2019) by Aleyda Auguste RN        Review Status Review Entered   Completed 3/1/2019 16:58      Criteria Review      Care Day: 9 Care Date: 2/27/2019 Level of Care: Inpatient Floor   Guideline Day 3    Level Of Care   (X) Floor to discharge[D]      Clinical Status   (X) * Hemodynamic stability   3/1/2019 16:58:28 EST by Boo Burch     VS 77.0-06-/15;  O2 sat 97% RA      (X) * Fever absent or improved   ( ) * Skin exam stable or improved   3/1/2019 16:58:28 EST by Boo Burch     No edema, warm, 2+ pulses throughout, Right Arm is bandaged.  able to wiggle fingers,Non Cellulitic arm proximally      (X) * Mental status at baseline   ( ) * Antibiotic treatment needs appropriate for next level of care   (X) * Pain absent or manageable at next level of care   ( ) * Discharge plans and education understood      Activity   ( ) * Ambulatory      Routes   (X) * Oral hydration, medications,[E]and diet   3/1/2019 16:58:28 EST by Boo Burch     regular diet         Medications   (X) Parenteral or oral antibiotics[A]   3/1/2019 16:58:28 EST by Boo Burch     vanco 1,000mg IV q 12hrs         3/1/2019 16:58:28 EST by Boo Burch   Subject: Additional Clinical Information      Na 136; K 4.7; creatinine 0.83      HIV non reactive            She reports pain under control       Right Middle Finger tenosynovitis - s/p I and D on 2/20 - On IV vancomycin and Rocephin , ID consult will be requested. MRSA positive in nares, Prelim Cultures showing MRSA. D/C Rocephin. Treated as skin/soft tissue infection. Appreciate ID consult - c/w IV vancomycin. Risk of interaction of bactrim with methadone (which patient is on)                  Chronic Hep C and elevated LFT - monitor, Hepatitis panel sent           Hypokalemia and hypomag - Repleted, recheck in am            H.o Opioid Dependence   - on methadone as op      UDS on admission - + amphetamines,Cocaine,methadone,opiates.                 Infectious Diseases Progress Note         No complaints except some swelling of the right middle finger. Assessment:              1.  MRSA septic flexor tenosynovitis of right middle finger              2. IVDU -- on methadone              3. Chronic Hep C                      Plan:              1. Continue Vancomycin              2.  HAA, HBsAb, Hep C RNA PCR & genotype ordered                        * Milestone         Cellulitis - Care Day 8 (2/26/2019) by Candida Jimneez RN        Review Status Review Entered   Completed 2/26/2019 12:30      Criteria Review      Care Day: 8 Care Date: 2/26/2019 Level of Care: Inpatient Floor   Guideline Day 3    Level Of Care   (X) Floor to discharge[D]   2/26/2019 12:30:54 EST by Yoel Marquez     Floor         Clinical Status   (X) * Hemodynamic stability   2/26/2019 12:30:54 EST by Yoel Marquez     V.S. 101/58, 74, 97.7, 18      (X) * Fever absent or improved   2/26/2019 12:30:54 EST by Leah Ledesma     Temperature 97.7      ( ) * Skin exam stable or improved   ( ) * Mental status at baseline   ( ) * Antibiotic treatment needs appropriate for next level of care   ( ) * Pain absent or manageable at next level of care   ( ) * Discharge plans and education understood      Activity   ( ) * Ambulatory      Routes   ( ) * Oral hydration, medications,[E]and diet Medications   (X) Parenteral or oral antibiotics[A]   2/26/2019 12:30:54 EST by Bishop Patricia     IV Vancomycin         2/26/2019 12:30:54 EST by Bishop Patricia   Subject: Additional Clinical Information   Pt seen in follow up of Right Middle Finger Tenosynovitis  S/p DebridementShe reports pain under control   In understanding with the plan for Inpatient stay  Assessment & Plan:  Right Middle Finger tenosynovitis - s/p I and D on 2/20 - On IV vancomycin and Rocephin , ID consult will be requested. MRSA positive in nares, Prelim Cultures showing MRSA. D/C Rocephin. Treated as skin/soft tissue infection. Appreciate ID consult - c/w IV vancomycin. Risk of interaction of bactrim with methadone (which patient is on)      Chronic Hep C and elevated LFT - monitor, Hepatitis panel sent     Hypokalemia and hypomag - Repleted, recheck in am      H.o Opioid Dependence   - on methadone as op   UDS on admission - + amphetamines,Cocaine,methadone,opiates.  Code status: FullDVT prophylaxis: heparin  Risk of deterioration: medium                      Total time spent with patient: 32 Minutes                     Care Plan discussed with: Patient/Family and NurseDisposition: TBD once finished IV abx   V.S.   101/58, 74, 97.7, 18,   Resp: CTA bilaterally. No wheezing/rhonchi/rales. No accessory muscle useCV: Regular rhythm, normal rate, no murmurs, gallops, rubs GI: Soft, non distended, non tender. normoactive bowel sounds, no hepatosplenomegaly  Musculoskeletal: No edema, warm, 2+ pulses throughout, Right Arm is bandaged. able to wiggle fingers,Non Cellulitic arm proximally    Neurologic: Moves all extremities.   AAOx3, CN II-XII reviewed                                          Psych:   Good insight, Not anxious nor agitated. Medicaitons,   Benadryl 25mg PO x1,   Motrin 400mg Po 3 times daily,   Methadone 55mg PO daily,   Vancomycin 1,000mg IV q 12 hours,                     * Milestone         Phys Advisor Determination Inpatient by Jaya Handy        Review Status Review Entered   In Primary 2/22/2019 16:48      Criteria Review   Feb 22      Letter of Status Determination:   Recommend hospitalization status upgraded from   OBSERVATION  to INPATIENT  Status      Pt Name:  Giovanny Zaidi   MR#   ANURADHA # [id number] /  [id number]  Payor: [address] [place] [id number] / Plan: [address] / Product Type: Managed Care [employer] /    Tracsis#                                                                          [id number]   Room and Hospital  [email or web address]   Hospitalization date  2/19/2019 10:48 AM   Current Attending Mannie Toscano MD   Principal diagnosis  Cellulitis of hand [N61.304]      Clinicals  53 y. o. y.o  female hospitalized with above diagnosis   The pt went through urgent surgery of the infected finger and tendon.    Wound culture is growing MRSA   She is receiving broad and specific spectrum abx       Milliman (MCG) criteria   Does   apply Cellulitis    STATUS DETERMINATION  Based on documented presenting clinical data, comorbid conditions, high risk of adverse events and deterioration, it is our recommendation that the patient's status should be upgraded from OBSERVATION to INPATIENT status.       The final decision of the patient's hospitalization status depends on the attending physician's judgment.          Additional comments      Payor: [address] [place] [id number] / Plan: [address] / Product Type: Managed Care Medicaid /          Abelardo Watters MD MPH FACP   Cell: [phone]  Physician [address]   President Medical Staff, [facility]

## 2019-03-05 LAB
ERYTHROCYTE [DISTWIDTH] IN BLOOD BY AUTOMATED COUNT: 12.5 % (ref 11.5–14.5)
HCT VFR BLD AUTO: 36 % (ref 35–47)
HCV GENTYP SERPL NAA+PROBE: NORMAL
HGB BLD-MCNC: 12.1 G/DL (ref 11.5–16)
MCH RBC QN AUTO: 32 PG (ref 26–34)
MCHC RBC AUTO-ENTMCNC: 33.6 G/DL (ref 30–36.5)
MCV RBC AUTO: 95.2 FL (ref 80–99)
NRBC # BLD: 0 K/UL (ref 0–0.01)
NRBC BLD-RTO: 0 PER 100 WBC
PLATELET # BLD AUTO: 144 K/UL (ref 150–400)
PLEASE NOTE, 550474: NORMAL
PMV BLD AUTO: 10.7 FL (ref 8.9–12.9)
RBC # BLD AUTO: 3.78 M/UL (ref 3.8–5.2)
WBC # BLD AUTO: 5.4 K/UL (ref 3.6–11)

## 2019-03-05 PROCEDURE — 85027 COMPLETE CBC AUTOMATED: CPT

## 2019-03-05 PROCEDURE — 74011250636 HC RX REV CODE- 250/636: Performed by: INTERNAL MEDICINE

## 2019-03-05 PROCEDURE — 36415 COLL VENOUS BLD VENIPUNCTURE: CPT

## 2019-03-05 PROCEDURE — 65270000029 HC RM PRIVATE

## 2019-03-05 PROCEDURE — 74011250637 HC RX REV CODE- 250/637: Performed by: HOSPITALIST

## 2019-03-05 RX ADMIN — VANCOMYCIN HYDROCHLORIDE 1000 MG: 1 INJECTION, POWDER, LYOPHILIZED, FOR SOLUTION INTRAVENOUS at 07:02

## 2019-03-05 RX ADMIN — Medication 10 ML: at 15:14

## 2019-03-05 RX ADMIN — Medication 10 ML: at 21:23

## 2019-03-05 RX ADMIN — IBUPROFEN 400 MG: 400 TABLET ORAL at 09:21

## 2019-03-05 RX ADMIN — IBUPROFEN 400 MG: 400 TABLET ORAL at 15:14

## 2019-03-05 RX ADMIN — Medication 10 ML: at 06:00

## 2019-03-05 RX ADMIN — METHADONE HYDROCHLORIDE 60 MG: 5 SOLUTION ORAL at 10:07

## 2019-03-05 RX ADMIN — VANCOMYCIN HYDROCHLORIDE 1000 MG: 1 INJECTION, POWDER, LYOPHILIZED, FOR SOLUTION INTRAVENOUS at 21:23

## 2019-03-05 RX ADMIN — IBUPROFEN 400 MG: 400 TABLET ORAL at 21:23

## 2019-03-05 NOTE — PROGRESS NOTES
Bedside shift change report given to Vik Gaston RN (oncoming nurse) by Kaycee Salas RN (offgoing nurse). Report included the following information SBAR.

## 2019-03-05 NOTE — PROGRESS NOTES
Hospitalist Progress Note  Kimi Vallejo MD  Answering service: [phone] from in house phone      Date of Service:  3/5/2019  NAME:  Beth Mcfarlane  :  1970  MRN:  [id number]    Admission Summary:   Pt admitted for Right hand pain ans swelling after hitting it    Interval history / Subjective:   Pt seen in follow up of Right Middle Finger Tenosynovitis  She has no new complaints  S/p Debridement   In understanding with the plan for Inpatient stay Dr. Monica Parker managing abx   Hep C patient is feeling ok    3/4  Stitches removed on Vancomycin for 2 more days. Hopefully discharge on Wednesday if [facility] with ID.  3/5: no changes, feels well, on IV vanc for 1-2 more days, then take PICC out on dc. Assessment & Plan:     1. Right Middle Finger tenosynovitis - s/p I and D on  - On IV vancomycin and Rocephin , ID consult will be requested. MRSA positive in nares, Prelim Cultures showing MRSA. D/C Rocephin. Treated as skin/soft tissue infection. Appreciate ID consult - c/w IV vancomycin. Risk of interaction of bactrim with methadone (which patient is on)     2. Chronic Hep C and elevated LFT - monitor, Dr. Maria Elena Becerril evaluated, f/u with him op  - needs to have Hep A and Hep B vaccinations outpatient. 3. Hypokalemia and hypomag - Repleted, recheck in am   4. H.o Opioid Dependence  - on methadone as op  UDS on admission - + amphetamines,Cocaine,methadone,opiates. Code status: Full  DVT prophylaxis: heparin   Care Plan discussed with: Patient/Family and Nurse  Disposition: TBD once finished IV abx      Hospital Problems  Date Reviewed: 2019          Codes Class Noted POA    Infection of hand ICD-10-CM: L08.9  ICD-9-CM: 686.9  2019 Unknown        * (Principal) Cellulitis of hand ICD-10-CM: Q89.102  ICD-9-CM: 682.4  2019 Yes                Review of Systems:   Pertinent items are noted in HPI.        Vital Signs:    Last 24hrs VS reviewed since prior progress note. Most recent are:  Visit Vitals  BP 95/62 (BP 1 Location: Right arm, BP Patient Position: Sitting)   Pulse 64   Temp 97.8 °F (36.6 °C)   Resp 16   Ht 5' 4\" (1.626 m)   Wt 56.7 kg (125 lb)   SpO2 97%   BMI 21.46 kg/m²       No intake or output data in the 24 hours ending 03/05/19 1125     Physical Examination:     Constitutional:  No acute distress, cooperative, pleasant        Resp:  CTA bilaterally. No wheezing/rhonchi/rales. No accessory muscle use   CV:  Regular rhythm, normal rate    GI:  Soft, non distended, non tender. Musculoskeletal:  No edema, warm, able to wiggle fingers,Non Cellulitic arm proximally       Data Review:    Review and/or order of clinical lab test  Review and/or order of tests in the radiology section of [facility]    Labs:     Recent Labs     03/05/19  0342   WBC 5.4   HGB 12.1   HCT 36.0   *     Recent Labs     03/04/19  0351      K 4.1      CO2 26   BUN 15   CREA 0.85   GLU 85   CA 8.9     No results for input(s): SGOT, GPT, ALT, AP, TBIL, TBILI, TP, ALB, GLOB, GGT, AML, LPSE in the last 72 hours. No lab exists for component: AMYP, HLPSE  No results for input(s): INR, PTP, APTT in the last 72 hours. No lab exists for component: INREXT, INREXT   No results for input(s): FE, TIBC, PSAT, FERR in the last 72 hours. No results found for: FOL, RBCF   No results for input(s): PH, PCO2, PO2 in the last 72 hours. No results for input(s): CPK, CKNDX, TROIQ in the last 72 hours.     No lab exists for component: CPKMB  No results found for: CHOL, CHOLX, CHLST, CHOLV, HDL, LDL, LDLC, DLDLP, TGLX, TRIGL, TRIGP, CHHD, CHHDX  Lab Results   Component Value Date/Time    Glucose (POC) 130 (H) 02/24/2019 09:31 AM     No results found for: COLOR, APPRN, SPGRU, REFSG, SUE, PROTU, GLUCU, KETU, BILU, UROU, YENIFER, LEUKU, GLUKE, EPSU, BACTU, WBCU, RBCU, CASTS, UCRY      Medications Reviewed:     Current Facility-Administered Medications   Medication Dose Route Frequency    [START ON 3/6/2019] Vancomycin level due @ 0800 on 3/6/19   Other ONCE    methadone (DOLOPHINE) 5 mg/5 mL oral solution 60 mg  60 mg Oral DAILY    diphenhydrAMINE (BENADRYL) capsule 25 mg  25 mg Oral Q6H PRN    sodium chloride (NS) flush 10 mL  10 mL InterCATHeter PRN    alteplase (CATHFLO) 1 mg in sterile water (preservative free) 1 mL injection  1 mg InterCATHeter PRN    vancomycin (VANCOCIN) 1,000 mg in 0.9% sodium chloride (MBP/ADV) 250 mL  1,000 mg IntraVENous Q12H    heparin (porcine) injection 5,000 Units  5,000 Units SubCUTAneous Q12H    sodium chloride (NS) flush 5-40 mL  5-40 mL IntraVENous Q8H    sodium chloride (NS) flush 5-40 mL  5-40 mL IntraVENous PRN    ibuprofen (MOTRIN) tablet 400 mg  400 mg Oral TID    Vancomycin Pharmacy Dosing   Other PRN     ______________________________________________________________________  EXPECTED LENGTH OF STAY: 2d 7h  ACTUAL LENGTH OF STAY:          Nickolas Molina MD   Patient has given Verbal permission to discuss medical care with   persons present in the room and and also with contact as listed on face sheet.

## 2019-03-05 NOTE — PROGRESS NOTES
NUTRITION  Pt seen for:       []           Supplements  []             PO intake check   []           Food Allergies  []             Food Preferences/tolerances    [x]           Rescreen   []             Education    []           Diet order clarification []             Other            RECOMMENDATIONS:   Continue diet as ordered  Consider weekly weight to have an updated baseline    SUBJECTIVE/OBJECTIVE:   Chart reviewed. Pt eating well without issues. No nutrition concerns or risk at this time. Will rescreen as indicated.      Diet: Regular CHEL    Intake: []           Good     []           Fair      []           Poor   Patient Vitals for the past 100 hrs:   % Diet Eaten   03/05/19 1440 75 %   03/05/19 0921 100 %   03/03/19 0846 100 %   03/02/19 1749 75 %   03/02/19 1212 50 %   03/02/19 0915 50 %   03/01/19 1855 75 %     Weight Changes:   Last 3 Recorded Weights in this Encounter    02/19/19 1039 02/20/19 0659   Weight: 56.7 kg (125 lb) 56.7 kg (125 lb)     Nutrition Problems Identified:  [x]      None    PLAN:     []           Obtained/adjusted food preferences/tolerances and/or snacks options   []           Dislikes supplements will try a substitution   []           Modify diet for food allergies  []           Adjust texture due to difficulty chewing   []           Encourage Fresh Fruit, Activia yogurt, fluid   []           Educated patient  [x]           Rescreen per screening protocol  []           Add Supplements    Rescreen:  []            At Nutrition Risk           [x]            Not at Nutrition Risk, rescreen per screening protocol    [address], [address]

## 2019-03-05 NOTE — PROGRESS NOTES
Infectious Diseases Progress Note    Antibiotic Summary:  Zosyn  2/19 x 1 dose  Vancomycin  -- present  Rocephin  --     Surgical I&D right middle finger on 2019: POD # 12    Subjective:     No complaints    Objective:     Vitals:   Visit Vitals  BP 99/68 (BP 1 Location: Right arm, BP Patient Position: At rest)   Pulse 72   Temp 97.7 °F (36.5 °C)   Resp 16   Ht 5' 4\" (1.626 m)   Wt 56.7 kg (125 lb)   LMP 2018 (Within Months)   SpO2 96%   BMI 21.46 kg/m²        Tmax:  Temp (24hrs), Av.9 °F (36.6 °C), Min:97.7 °F (36.5 °C), Max:98.1 °F (36.7 °C)      Exam:  General appearance: alert, no distress  Lungs: clear to auscultation bilaterally  Heart: regular rate and rhythm  Abdomen: soft, non-tender. Bowel sounds normal.   Right hand: incision on middle finger looks good with no cellulitis and no purulent drainage; ROM increased    IV Lines: Left PICC inserted 2019    Labs:    Recent Labs     19  0351 19  0251   BUN 15 22*   CREA 0.85 0.93     HIV Ab/Ag = negative  Hep A total Ab = negative  HBsAg = negative  HBsAb = negative  Hep C RNA PCR = positive with 5,670,000 copies  Hep C genotype = pending    Vanc trough  = 14.3    Assessment:     1. MRSA septic flexor tenosynovitis of right middle finger     2. IVDU -- on methadone     3. Chronic Hep C: The Hep C RNA PCR has returned positive. She should be given the Hep A & hep B vaccine series. Acute Hep A or Hep B can be fulminant and life threatening in patients with chronic Hep C. Appreciate evaluation and F/U by Dr. Tylor Mathis:     1. Continue Vancomycin    2.  She needs the Peter Grooms vaccine series      James Fountain MD

## 2019-03-06 VITALS
SYSTOLIC BLOOD PRESSURE: 102 MMHG | OXYGEN SATURATION: 97 % | HEART RATE: 64 BPM | RESPIRATION RATE: 16 BRPM | WEIGHT: 125 LBS | BODY MASS INDEX: 21.34 KG/M2 | HEIGHT: 64 IN | TEMPERATURE: 98.1 F | DIASTOLIC BLOOD PRESSURE: 66 MMHG

## 2019-03-06 LAB
ANION GAP SERPL CALC-SCNC: 8 MMOL/L (ref 5–15)
BUN SERPL-MCNC: 18 MG/DL (ref 6–20)
BUN/CREAT SERPL: 22 (ref 12–20)
CALCIUM SERPL-MCNC: 9.2 MG/DL (ref 8.5–10.1)
CHLORIDE SERPL-SCNC: 105 MMOL/L (ref 97–108)
CO2 SERPL-SCNC: 26 MMOL/L (ref 21–32)
CREAT SERPL-MCNC: 0.82 MG/DL (ref 0.55–1.02)
DATE LAST DOSE: ABNORMAL
GLUCOSE SERPL-MCNC: 87 MG/DL (ref 65–100)
POTASSIUM SERPL-SCNC: 4.3 MMOL/L (ref 3.5–5.1)
REPORTED DOSE,DOSE: ABNORMAL UNITS
REPORTED DOSE/TIME,TMG: ABNORMAL
SODIUM SERPL-SCNC: 139 MMOL/L (ref 136–145)
VANCOMYCIN TROUGH SERPL-MCNC: 17.9 UG/ML (ref 5–10)

## 2019-03-06 PROCEDURE — 74011250636 HC RX REV CODE- 250/636: Performed by: INTERNAL MEDICINE

## 2019-03-06 PROCEDURE — 36592 COLLECT BLOOD FROM PICC: CPT

## 2019-03-06 PROCEDURE — 74011250637 HC RX REV CODE- 250/637: Performed by: HOSPITALIST

## 2019-03-06 PROCEDURE — 80048 BASIC METABOLIC PNL TOTAL CA: CPT

## 2019-03-06 PROCEDURE — 80202 ASSAY OF VANCOMYCIN: CPT

## 2019-03-06 PROCEDURE — 36415 COLL VENOUS BLD VENIPUNCTURE: CPT

## 2019-03-06 RX ORDER — SULFAMETHOXAZOLE AND TRIMETHOPRIM 800; 160 MG/1; MG/1
2 TABLET ORAL 2 TIMES DAILY
Qty: 14 TAB | Refills: 1 | Status: SHIPPED | OUTPATIENT
Start: 2019-03-06 | End: 2019-03-06 | Stop reason: SDUPTHER

## 2019-03-06 RX ORDER — SULFAMETHOXAZOLE AND TRIMETHOPRIM 800; 160 MG/1; MG/1
2 TABLET ORAL 2 TIMES DAILY
Qty: 28 TAB | Refills: 0 | Status: SHIPPED | OUTPATIENT
Start: 2019-03-06 | End: 2019-03-13

## 2019-03-06 RX ADMIN — Medication 10 ML: at 06:57

## 2019-03-06 RX ADMIN — IBUPROFEN 400 MG: 400 TABLET ORAL at 16:57

## 2019-03-06 RX ADMIN — VANCOMYCIN HYDROCHLORIDE 1000 MG: 1 INJECTION, POWDER, LYOPHILIZED, FOR SOLUTION INTRAVENOUS at 07:00

## 2019-03-06 RX ADMIN — METHADONE HYDROCHLORIDE 60 MG: 5 SOLUTION ORAL at 09:22

## 2019-03-06 RX ADMIN — IBUPROFEN 400 MG: 400 TABLET ORAL at 09:21

## 2019-03-06 NOTE — PROGRESS NOTES
Patient is being discharge home and will be giving one prescription for Bactrim DS. Picc line is out. No distress noted.

## 2019-03-06 NOTE — PROGRESS NOTES
Problem: Pressure Injury - Risk of  Goal: *Prevention of pressure injury  Document Manuel Scale and appropriate interventions in the flowsheet.   Outcome: Progressing Towards Goal  Pressure Injury Interventions:  Sensory Interventions: Assess changes in LOC    Moisture Interventions: Maintain skin hydration (lotion/cream)    Activity Interventions: Pressure redistribution bed/mattress(bed type)    Mobility Interventions: HOB 30 degrees or less    Nutrition Interventions: Document food/fluid/supplement intake    Friction and Shear Interventions: HOB 30 degrees or less

## 2019-03-06 NOTE — PROGRESS NOTES
Infectious Diseases Progress Note    Antibiotic Summary:  Zosyn  2/19 x 1 dose  Vancomycin  -- present  Rocephin  --     Surgical I&D right middle finger on 2019: POD # 13    Subjective:     No complaints; feels better    Objective:     Vitals:   Visit Vitals  BP 93/66 (BP Patient Position: At rest)   Pulse 70   Temp 97.7 °F (36.5 °C)   Resp 16   Ht 5' 4\" (1.626 m)   Wt 56.7 kg (125 lb)   LMP 2018 (Within Months)   SpO2 98%   BMI 21.46 kg/m²        Tmax:  Temp (24hrs), Av.1 °F (36.7 °C), Min:97.7 °F (36.5 °C), Max:98.9 °F (37.2 °C)      Exam:  General appearance: alert, no distress  Lungs: clear to auscultation bilaterally  Heart: regular rate and rhythm  Abdomen: soft, non-tender. Bowel sounds normal.   Right hand: incision on middle finger looks good with no cellulitis and no purulent drainage; ROM increased    IV Lines: Left PICC inserted 2019    Labs:    Recent Labs     19  0342 19  0351   WBC 5.4  --    HGB 12.1  --    *  --    BUN  --  15   CREA  --  0.85     HIV Ab/Ag = negative  Hep A total Ab = negative  HBsAg = negative  HBsAb = negative  Hep C RNA PCR = positive with 5,670,000 copies  Hep C genotype = pending    Vanc trough  = 14.3    Assessment:     1. MRSA septic flexor tenosynovitis of right middle finger     2. IVDU -- on methadone     3. Chronic Hep C: The Hep C RNA PCR has returned positive. She should be given the Hep A & hep B vaccine series. Acute Hep A or Hep B can be fulminant and life threatening in patients with chronic Hep C. Appreciate evaluation and F/U by Dr. Napoleon Bailey:     1. Continue Vancomycin    2. I think she could go home 3/6 on:   Bactrim DS -- 2 po bid (would prescibe 1 week supply with 1 refill)    3. Labs on 3/11 thru my office -- CBC & diff, electrolyes, BUN, creatinine    4.  F/U with me in 1 week and see  in F/U also      Ambia Genny., MD

## 2019-03-06 NOTE — DISCHARGE SUMMARY
Discharge Summary       PATIENT ID: Vamsi Servin  MRN: [id number]   YOB: 1970    DATE OF ADMISSION: 2/19/2019 10:48 AM    DATE OF DISCHARGE: 3/6/2019   PRIMARY CARE PROVIDER: None     ATTENDING PHYSICIAN: Meche Cancino MD  DISCHARGING PROVIDER: Meche Cancino MD    To contact this individual call [phone] and ask the  to page. If unavailable ask to be transferred the Adult Hospitalist Department. CONSULTATIONS: IP CONSULT TO ORTHOPEDIC SURGERY  IP CONSULT TO INFECTIOUS DISEASES  IP CONSULT TO INFECTIOUS DISEASES  IP CONSULT TO ANESTHESIOLOGY  IP CONSULT TO HEPATOLOGY    PROCEDURES/SURGERIES: Procedure(s) with comments:  INCISION AND DRAINAGE RIGHT MIDDLE FINGER - [address] COURSE:   Admitted with hand infection with MRSA, Orthopedics performed surgery on her middle finger and drained infection out, treated with Vancomycin iv and switched to Bactrim PO by ID on discharge    Risk of Re-Admission: low  DISCHARGE DIAGNOSES / PLAN:      Right Middle Finger tenosynovitis - s/p I & D by Orthopedics on 2/20 - ID consult will be requested. Cultures showing MRSA. IV vancomycin. And switched to PO bactrim on dc. Needs blood tests with Dr Fauzia Wellington outpatient in one week, BMP/CBC     Chronic Hep C and elevated LFT - monitor, Dr. Kiara Winkler evaluated, f/u with him op  - needs to have Hep A and Hep B vaccinations outpatient. 3. Hypokalemia and hypomag - Repleted, recheck in am   4. H.o Opioid Dependence  - on methadone as op  UDS on admission + amphetamines,Cocaine,methadone,opiates. FOLLOW UP APPOINTMENTS:    Follow-up Information     Follow up With Specialties Details Why Contact Info    Evelyn Valdivia MD Internal Medicine On 3/5/2019 Hospital f/u new PCP appointment Tuesday, 3/5/19 @ 1:15 p.m. Please arrive 15 minutes early with photo ID, insurance card and a listing of all medications.   [facility]  [address] [facility] [address]  [phone]      None  In 1 week  None (395) Patient stated that they have no PCP      Vikki Lala MD Infectious Diseases In 1 week  [facility]  [phone]      Laurence Burns MD Orthopedic Surgery In 1 week  [address] [facility] [address] [address]  [phone]      Eli Valerio MD Hepatology, Liver Disease, Internal Medicine   [address] [address]  [phone]             ADDITIONAL CARE RECOMMENDATIONS:  Follow up with PCP, ID, Orthopedics and Hepatology    DIET: Resume previous diet    ACTIVITY: Activity as tolerated    DISCHARGE MEDICATIONS:  Current Discharge Medication List      START taking these medications    Details   trimethoprim-sulfamethoxazole (BACTRIM DS) 160-800 mg per tablet Take 2 Tabs by mouth two (2) times a day for 7 days. Qty: 14 Tab, Refills: 1         CONTINUE these medications which have NOT CHANGED    Details   methadone (DOLOPHINE) 10 mg/mL solution Take 55 mg by mouth daily. NOTIFY YOUR PHYSICIAN FOR ANY OF THE FOLLOWING:   Fever over 101 degrees for 24 hours. Chest pain, shortness of breath, fever, chills, nausea, vomiting, diarrhea, change in mentation, falling, weakness, bleeding. Severe pain or pain not relieved by medications. Or, any other signs or symptoms that you may have questions about. DISPOSITION:    Home With:   OT  PT  HH  RN       Long term SNF/Inpatient Rehab   x Independent/assisted living    Hospice    Other:       PATIENT CONDITION AT DISCHARGE:     Functional status    Poor     Deconditioned     Independent      Cognition     Lucid     Forgetful     Dementia      Catheters/lines (plus indication)    [place]     PICC     PEG     None      Code status     Full code     DNR      PHYSICAL EXAMINATION AT DISCHARGE:  Patient seen and examined at bedside, Condition stable, explained discharge and follow up plans.   General: Aggie Gonzalez, oriented, No acute distress  Resp:  No accessory muscle use, Good AE. Neuro:  Grossly normal, no focal neuro deficits, follows commands   CHRONIC MEDICAL DIAGNOSES:  Problem List as of 3/6/2019 Date Reviewed: 2/19/2019          Codes Class Noted - Resolved    Infection of hand ICD-10-CM: L08.9  ICD-9-CM: 686.9  2/22/2019 - Present        * (Principal) Cellulitis of hand ICD-10-CM: O10.149  ICD-9-CM: 682.4  2/19/2019 - Present              37 minutes were spent with the patient on counseling and coordination of care.     Signed:   Lashanda Moreland MD  3/6/2019  10:39 AM

## 2019-03-06 NOTE — PROGRESS NOTES
Pharmacist Note - Vancomycin Dosing  Therapy day 16  Indication: MRSA septic flexor tenosynovitis of right middle finger, s/p I&D; h/o IVDU  Current regimen:  1000 mg IV Q12h    A Trough Level resulted at 17.9 mcg/mL which was obtained ~10.5 hrs post-dose. The extrapolated \"true\" trough is approximately 15-16 mcg/mL based on the patient's known kinetics. Goal trough: 10 - 15 mcg/mL     Plan: Change to 750mg Q12h. Pharmacy will continue to monitor this patient daily for changes in clinical status and renal function.

## 2019-03-06 NOTE — PROGRESS NOTES
Patient d/c noted. CM arranging cab transport to [address] [place] [address] via [employer]. MOHIT Verdugo, CRM    [employer] request processed. 1:30p requested. Patient has a 4 hr window with medicaid cab  Transport.     MOHIT Verdugo, CRM

## 2019-03-06 NOTE — PROGRESS NOTES
Patient listed as not having a primary care physician. Hospital follow-up PCP transitional care appointment has been scheduled with Dr. Magnus Pierre for Tuesday, 3/12/19 at 1:15 p.m. Pending patient discharge.   Magaly Renteria, Care Management Specialist.

## 2019-03-06 NOTE — DISCHARGE INSTRUCTIONS
Please call [facility] to schedule follow-up with Dr. Marleny Dunn for next week. [address], [address] Phone: [phone]    Please do not return to work until you are seen by Dr. Olmedo Even in the office. Continue with daily right hand exercises. Elevate your hand to control swelling. You should start outpatient hand therapy (2x/wk) as soon as possible. You may wash your hand normally. You should cover the incision if there is evidence of drainage but otherwise you do not require a dressing. Please call Dr. Urrutia Purple office if there is an increase in pain, drainage, redness, swelling, or fever.

## 2019-03-06 NOTE — PROGRESS NOTES
Ortho: ID cleared to home today. Patient denies pain and active drainage. Still stiff. T 98.5, HR 74, BP 91/60  Right hand incision healed with no active drainage. No erythema. Limited ext DIP. Flexion improving. Imp:  POD #14 I&D right middle finger  MRSA    Plan:  1. OK to home. OK to wash hand. No dressing needed if no drainage. 2. Cont AROM/PROM right middle finger. Outpatient hand therapy 2x/wk. 3. F/U Dr. Chio Mantilla next week. No work until seen in F/U.       Fortino Angelucci, PA

## 2019-03-19 NOTE — ADT AUTH CERT NOTES
Utilization Reviews        Cellulitis - Care Day 15 (3/5/2019) by Doroteo Shafer RN        Review Status Review Entered   Completed 3/6/2019 11:51      Criteria Review      Care Day: 15 Care Date: 3/5/2019 Level of Care: Inpatient Floor   Guideline Day 3    Level Of Care   (X) Floor to discharge[D]   3/6/2019 11:51:38 EST by Sihela Hidden     Floor         Clinical Status   ( ) * Hemodynamic stability   (X) * Fever absent or improved   3/6/2019 11:51:38 EST by Leah Schilling     Temperature 97.7      ( ) * Skin exam stable or improved   ( ) * Mental status at baseline   ( ) * Antibiotic treatment needs appropriate for next level of care   ( ) * Pain absent or manageable at next level of care   ( ) * Discharge plans and education understood      Activity   ( ) * Ambulatory      Routes   (X) * Oral hydration, medications,[E]and diet      Interventions   (X) WBC   3/6/2019 11:51:38 EST by Leah Schilling     WBC 5.4,         Medications   (X) Parenteral or oral antibiotics[A]   3/6/2019 11:51:38 EST by Leah Schilling     IV vancomycin         3/6/2019 11:51:38 EST by Shiela Hidden   Subject: Additional Clinical Information   Antibiotic Summary:Zosyn                          2/19 x 1 doseVancomycin       2/19 -- presentRocephin                 2/20 -- 2/21  Surgical I&D right middle finger on 2/20/2019: POD # 13  Subjective:  No complaints; feels betterV. S.   93/66, 70, 97.7, 16,   Exam:   General appearance: alert, no distressLungs: clear to auscultation bilaterallyHeart: regular rate and rhythmAbdomen: soft, non-tender. Bowel sounds normal. Right hand: incision on middle finger looks good with no cellulitis and no purulent drainage; ROM increased  IV Lines: Left PICC inserted 2/26/2019Labs,   WBC 5.4, hgb 12.1, platelet 617,   Assessment:  1.  MRSA septic flexor tenosynovitis of right middle finger  2. IVDU -- on methadone  3. Chronic Hep C: The Hep C RNA PCR has returned positive.  She should be given the Hep A & hep B vaccine series. Acute Hep A or Hep B can be fulminant and life threatening in patients with chronic Hep C. Appreciate evaluation and F/U by Dr. Annelise Ferrer:  1. Continue Vancomycin  2. I think she could go home 3/6 on:                       Bactrim DS -- 2 po bid (would prescibe 1 week supply with 1 refill)  3. Labs on 3/11 thru my office -- CBC & diff, electrolyes, BUN, creatinineMedicaitons,   Motrin 400mg PO 3 times dialy,   methadone 60mg PO daily,   Vanocmycin 1,000mg IV q 12 hours                     * Milestone         Cellulitis - Care Day 14 (3/4/2019) by Yoly Velázquez RN        Review Status Review Entered   Completed 3/6/2019 11:47      Criteria Review      Care Day: 14 Care Date: 3/4/2019 Level of Care: Inpatient Floor   Guideline Day 3    Level Of Care   (X) Floor to discharge[D]   3/6/2019 11:47:13 EST by Nallely Sanabria     Floor         Clinical Status   ( ) * Hemodynamic stability   (X) * Fever absent or improved   3/6/2019 11:47:13 EST by Leah Shaffer     Temp 97.7      ( ) * Skin exam stable or improved   ( ) * Mental status at baseline   ( ) * Antibiotic treatment needs appropriate for next level of care   ( ) * Pain absent or manageable at next level of care   ( ) * Discharge plans and education understood      Activity   ( ) * Ambulatory      Routes   (X) * Oral hydration, medications,[E]and diet   3/6/2019 11:47:13 EST by Nallely Sanabria     Diet regular 3-4 GM (CHEL)         Medications   (X) Parenteral or oral antibiotics[A]   3/6/2019 11:47:13 EST by Leah Shaffer     IV vancomycin         3/6/2019 11:47:13 EST by Nallely Sanabria   Subject: Additional Clinical Information   Antibiotic Summary:Zosyn                          2/19 x 1 doseVancomycin       2/19 -- presentRocephin                 2/20 -- 2/21  Surgical I&D right middle finger on 2/20/2019: POD # 12V. S.   99/68, 72, 97.7, 16,   Exam:   General appearance: alert, no distressLungs: clear to auscultation bilaterallyHeart: regular rate and rhythmAbdomen: soft, non-tender. Bowel sounds normal. Right hand: incision on middle finger looks good with no cellulitis and no purulent drainage; ROM increased  IV Lines: Left PICC inserted 2/26/2019    HIV Ab/Ag = negativeHep A total Ab = negativeHBsAg = negativeHBsAb = negativeHep C RNA PCR = positive with 5,670,000 copiesHep C genotype = pending  Vanc trough 2/27 = 14.3  Assessment:  1.  MRSA septic flexor tenosynovitis of right middle finger  2. IVDU -- on methadone  3. Chronic Hep C: The Hep C RNA PCR has returned positive. She should be given the Hep A & hep B vaccine series. Acute Hep A or Hep B can be fulminant and life threatening in patients with chronic Hep C.  Appreciate evaluation and F/U by Dr. Constance Leyva-   Medications,   Motrin 400mg Po 3 times dialy,   methadone 60mg PO daily,   Vancomycin 1,000mg IV q 12 hours,                                    * Milestone         Cellulitis - Care Day 11 (3/1/2019) by Arlin Jung RN        Review Status Review Entered   Completed 3/1/2019 17:15      Criteria Review      Care Day: 11 Care Date: 3/1/2019 Level of Care: Inpatient Floor   Guideline Day 3    Level Of Care   (X) Floor to discharge[D]      Clinical Status   (X) * Hemodynamic stability   3/1/2019 17:15:14 EST by Lemon Laughter     VS 09.7-83-16-15/52;  O2 sat 97%      (X) * Fever absent or improved   ( ) * Skin exam stable or improved   (X) * Mental status at baseline   ( ) * Antibiotic treatment needs appropriate for next level of care   (X) * Pain absent or manageable at next level of care   3/1/2019 17:15:14 EST by Lemon Laughter     pain under control; dolophine 60mg po q day      ( ) * Discharge plans and education understood      Activity   ( ) * Ambulatory      Routes   (X) * Oral hydration, medications,[E]and diet   3/1/2019 17:15:14 EST by Lemon Laughter     regular diet         Interventions   (X) WBC   3/1/2019 17:15:14 EST by Lemon Laughter     5.3 Medications   (X) Parenteral or oral antibiotics[A]      3/1/2019 17:15:14 EST by Osmel Dick   Subject: Additional Clinical Information   Pt seen in follow up of Right Middle Finger Tenosynovitis  S/p Debridement She reports pain under control Per request methadone dose adjusted  In understanding with the plan for Inpatient stay Dr. [facility] as Hep C as well consulted hepatology       Physical Exam:   No edema, warm, 2+ pulses throughout, Right Arm is bandaged. able to wiggle fingers,Non Cellulitic arm proximally      A/P:      Right Middle Finger tenosynovitis - s/p I and D on 2/20 - On IV vancomycin and Rocephin , ID consult will be requested. MRSA positive in nares, Prelim Cultures showing MRSA. D/C Rocephin. Treated as skin/soft tissue infection. Appreciate ID consult - c/w IV vancomycin. Risk of interaction of bactrim with methadone (which patient is on)            Chronic Hep C and elevated LFT - monitor, Dr. Hitchcock [facility] consulted     Hypokalemia and hypomag - Repleted, recheck in am      H.o Opioid Dependence   - on methadone as op   UDS on admission - + amphetamines,Cocaine,methadone,opiates.       no imaging   additional orders:   hep SQ q 8hrs                     * Milestone

## 2019-09-02 ENCOUNTER — APPOINTMENT (OUTPATIENT)
Dept: GENERAL RADIOLOGY | Age: 49
DRG: 549 | End: 2019-09-02
Attending: EMERGENCY MEDICINE
Payer: SELF-PAY

## 2019-09-02 ENCOUNTER — HOSPITAL ENCOUNTER (INPATIENT)
Age: 49
LOS: 3 days | Discharge: LEFT BEFORE DISCHARGE | DRG: 549 | End: 2019-09-05
Attending: EMERGENCY MEDICINE | Admitting: INTERNAL MEDICINE
Payer: SELF-PAY

## 2019-09-02 DIAGNOSIS — D72.829 LEUKOCYTOSIS, UNSPECIFIED TYPE: ICD-10-CM

## 2019-09-02 DIAGNOSIS — M25.511 PAIN IN JOINT OF RIGHT SHOULDER: ICD-10-CM

## 2019-09-02 DIAGNOSIS — F19.90 IV DRUG USER: Primary | ICD-10-CM

## 2019-09-02 PROBLEM — M00.9 PYOGENIC ARTHRITIS OF RIGHT SHOULDER REGION (HCC): Status: ACTIVE | Noted: 2019-09-02

## 2019-09-02 PROBLEM — M00.9 SEPTIC ARTHRITIS (HCC): Status: ACTIVE | Noted: 2019-09-02

## 2019-09-02 LAB
ALBUMIN SERPL-MCNC: 3.5 G/DL (ref 3.5–5)
ALBUMIN/GLOB SERPL: 0.7 {RATIO} (ref 1.1–2.2)
ALP SERPL-CCNC: 121 U/L (ref 45–117)
ALT SERPL-CCNC: 47 U/L (ref 12–78)
ANION GAP SERPL CALC-SCNC: 8 MMOL/L (ref 5–15)
AST SERPL-CCNC: 42 U/L (ref 15–37)
BASOPHILS # BLD: 0.1 K/UL (ref 0–0.1)
BASOPHILS NFR BLD: 0 % (ref 0–1)
BILIRUB SERPL-MCNC: 0.8 MG/DL (ref 0.2–1)
BUN SERPL-MCNC: 12 MG/DL (ref 6–20)
BUN/CREAT SERPL: 15 (ref 12–20)
CALCIUM SERPL-MCNC: 9.3 MG/DL (ref 8.5–10.1)
CHLORIDE SERPL-SCNC: 98 MMOL/L (ref 97–108)
CK SERPL-CCNC: 63 U/L (ref 26–192)
CO2 SERPL-SCNC: 24 MMOL/L (ref 21–32)
CREAT SERPL-MCNC: 0.79 MG/DL (ref 0.55–1.02)
CRP SERPL-MCNC: 16.9 MG/DL (ref 0–0.6)
DIFFERENTIAL METHOD BLD: ABNORMAL
EOSINOPHIL # BLD: 0.1 K/UL (ref 0–0.4)
EOSINOPHIL NFR BLD: 0 % (ref 0–7)
ERYTHROCYTE [DISTWIDTH] IN BLOOD BY AUTOMATED COUNT: 14.2 % (ref 11.5–14.5)
ERYTHROCYTE [SEDIMENTATION RATE] IN BLOOD: 51 MM/HR (ref 0–20)
GLOBULIN SER CALC-MCNC: 4.9 G/DL (ref 2–4)
GLUCOSE SERPL-MCNC: 90 MG/DL (ref 65–100)
HCT VFR BLD AUTO: 41.4 % (ref 35–47)
HGB BLD-MCNC: 13.7 G/DL (ref 11.5–16)
IMM GRANULOCYTES # BLD AUTO: 0.1 K/UL (ref 0–0.04)
IMM GRANULOCYTES NFR BLD AUTO: 1 % (ref 0–0.5)
LACTATE BLD-SCNC: 0.42 MMOL/L (ref 0.4–2)
LYMPHOCYTES # BLD: 1.4 K/UL (ref 0.8–3.5)
LYMPHOCYTES NFR BLD: 8 % (ref 12–49)
MCH RBC QN AUTO: 33 PG (ref 26–34)
MCHC RBC AUTO-ENTMCNC: 33.1 G/DL (ref 30–36.5)
MCV RBC AUTO: 99.8 FL (ref 80–99)
MONOCYTES # BLD: 1.4 K/UL (ref 0–1)
MONOCYTES NFR BLD: 8 % (ref 5–13)
MYOGLOBIN SERPL-MCNC: 31 NG/ML (ref 13–71)
NEUTS SEG # BLD: 15 K/UL (ref 1.8–8)
NEUTS SEG NFR BLD: 83 % (ref 32–75)
NRBC # BLD: 0 K/UL (ref 0–0.01)
NRBC BLD-RTO: 0 PER 100 WBC
PLATELET # BLD AUTO: 190 K/UL (ref 150–400)
PMV BLD AUTO: 10.7 FL (ref 8.9–12.9)
POTASSIUM SERPL-SCNC: 3.4 MMOL/L (ref 3.5–5.1)
PROT SERPL-MCNC: 8.4 G/DL (ref 6.4–8.2)
RBC # BLD AUTO: 4.15 M/UL (ref 3.8–5.2)
SODIUM SERPL-SCNC: 130 MMOL/L (ref 136–145)
WBC # BLD AUTO: 18 K/UL (ref 3.6–11)

## 2019-09-02 PROCEDURE — 83874 ASSAY OF MYOGLOBIN: CPT

## 2019-09-02 PROCEDURE — 80053 COMPREHEN METABOLIC PANEL: CPT

## 2019-09-02 PROCEDURE — 36415 COLL VENOUS BLD VENIPUNCTURE: CPT

## 2019-09-02 PROCEDURE — 73030 X-RAY EXAM OF SHOULDER: CPT

## 2019-09-02 PROCEDURE — 85025 COMPLETE CBC W/AUTO DIFF WBC: CPT

## 2019-09-02 PROCEDURE — 74011000258 HC RX REV CODE- 258: Performed by: EMERGENCY MEDICINE

## 2019-09-02 PROCEDURE — 94762 N-INVAS EAR/PLS OXIMTRY CONT: CPT

## 2019-09-02 PROCEDURE — 82550 ASSAY OF CK (CPK): CPT

## 2019-09-02 PROCEDURE — 87040 BLOOD CULTURE FOR BACTERIA: CPT

## 2019-09-02 PROCEDURE — 65270000029 HC RM PRIVATE

## 2019-09-02 PROCEDURE — 83605 ASSAY OF LACTIC ACID: CPT

## 2019-09-02 PROCEDURE — 85652 RBC SED RATE AUTOMATED: CPT

## 2019-09-02 PROCEDURE — 86140 C-REACTIVE PROTEIN: CPT

## 2019-09-02 PROCEDURE — 96365 THER/PROPH/DIAG IV INF INIT: CPT

## 2019-09-02 PROCEDURE — 74011250636 HC RX REV CODE- 250/636

## 2019-09-02 PROCEDURE — 99284 EMERGENCY DEPT VISIT MOD MDM: CPT

## 2019-09-02 PROCEDURE — 75810000054 HC ARTHOCENTISIS JOINT

## 2019-09-02 PROCEDURE — 74011000250 HC RX REV CODE- 250: Performed by: EMERGENCY MEDICINE

## 2019-09-02 PROCEDURE — 96375 TX/PRO/DX INJ NEW DRUG ADDON: CPT

## 2019-09-02 PROCEDURE — 74011250636 HC RX REV CODE- 250/636: Performed by: EMERGENCY MEDICINE

## 2019-09-02 RX ORDER — ONDANSETRON 2 MG/ML
4 INJECTION INTRAMUSCULAR; INTRAVENOUS
Status: DISCONTINUED | OUTPATIENT
Start: 2019-09-02 | End: 2019-09-05 | Stop reason: HOSPADM

## 2019-09-02 RX ORDER — SODIUM CHLORIDE 9 MG/ML
1000 INJECTION, SOLUTION INTRAVENOUS ONCE
Status: COMPLETED | OUTPATIENT
Start: 2019-09-02 | End: 2019-09-02

## 2019-09-02 RX ORDER — SODIUM CHLORIDE 9 MG/ML
50 INJECTION, SOLUTION INTRAVENOUS CONTINUOUS
Status: DISCONTINUED | OUTPATIENT
Start: 2019-09-03 | End: 2019-09-05 | Stop reason: HOSPADM

## 2019-09-02 RX ORDER — LIDOCAINE HYDROCHLORIDE AND EPINEPHRINE 10; 10 MG/ML; UG/ML
1.5 INJECTION, SOLUTION INFILTRATION; PERINEURAL ONCE
Status: COMPLETED | OUTPATIENT
Start: 2019-09-02 | End: 2019-09-02

## 2019-09-02 RX ORDER — KETOROLAC TROMETHAMINE 30 MG/ML
15 INJECTION, SOLUTION INTRAMUSCULAR; INTRAVENOUS
Status: DISCONTINUED | OUTPATIENT
Start: 2019-09-03 | End: 2019-09-03

## 2019-09-02 RX ORDER — HYDROMORPHONE HYDROCHLORIDE 1 MG/ML
1 INJECTION, SOLUTION INTRAMUSCULAR; INTRAVENOUS; SUBCUTANEOUS
Status: DISCONTINUED | OUTPATIENT
Start: 2019-09-02 | End: 2019-09-02 | Stop reason: ALTCHOICE

## 2019-09-02 RX ORDER — HEPARIN SODIUM 5000 [USP'U]/ML
5000 INJECTION, SOLUTION INTRAVENOUS; SUBCUTANEOUS EVERY 8 HOURS
Status: DISCONTINUED | OUTPATIENT
Start: 2019-09-03 | End: 2019-09-04 | Stop reason: ALTCHOICE

## 2019-09-02 RX ORDER — MORPHINE SULFATE 10 MG/ML
10 INJECTION, SOLUTION INTRAMUSCULAR; INTRAVENOUS ONCE
Status: COMPLETED | OUTPATIENT
Start: 2019-09-02 | End: 2019-09-02

## 2019-09-02 RX ORDER — ACETAMINOPHEN 325 MG/1
650 TABLET ORAL
Status: DISCONTINUED | OUTPATIENT
Start: 2019-09-02 | End: 2019-09-03

## 2019-09-02 RX ORDER — SODIUM CHLORIDE 0.9 % (FLUSH) 0.9 %
5-40 SYRINGE (ML) INJECTION EVERY 8 HOURS
Status: DISCONTINUED | OUTPATIENT
Start: 2019-09-03 | End: 2019-09-05 | Stop reason: HOSPADM

## 2019-09-02 RX ORDER — METHADONE HYDROCHLORIDE 10 MG/ML
55 CONCENTRATE ORAL DAILY
Status: DISCONTINUED | OUTPATIENT
Start: 2019-09-03 | End: 2019-09-03 | Stop reason: DRUGHIGH

## 2019-09-02 RX ORDER — KETOROLAC TROMETHAMINE 30 MG/ML
15 INJECTION, SOLUTION INTRAMUSCULAR; INTRAVENOUS
Status: COMPLETED | OUTPATIENT
Start: 2019-09-02 | End: 2019-09-02

## 2019-09-02 RX ORDER — MORPHINE SULFATE 10 MG/ML
INJECTION, SOLUTION INTRAMUSCULAR; INTRAVENOUS
Status: COMPLETED
Start: 2019-09-02 | End: 2019-09-02

## 2019-09-02 RX ORDER — POTASSIUM CHLORIDE 14.9 MG/ML
10 INJECTION INTRAVENOUS
Status: COMPLETED | OUTPATIENT
Start: 2019-09-03 | End: 2019-09-03

## 2019-09-02 RX ORDER — VANCOMYCIN HYDROCHLORIDE
1250
Status: COMPLETED | OUTPATIENT
Start: 2019-09-02 | End: 2019-09-03

## 2019-09-02 RX ORDER — SODIUM CHLORIDE 0.9 % (FLUSH) 0.9 %
5-40 SYRINGE (ML) INJECTION AS NEEDED
Status: DISCONTINUED | OUTPATIENT
Start: 2019-09-02 | End: 2019-09-05 | Stop reason: HOSPADM

## 2019-09-02 RX ORDER — IBUPROFEN 200 MG
1 TABLET ORAL DAILY
Status: DISCONTINUED | OUTPATIENT
Start: 2019-09-03 | End: 2019-09-05 | Stop reason: HOSPADM

## 2019-09-02 RX ORDER — BISACODYL 5 MG
5 TABLET, DELAYED RELEASE (ENTERIC COATED) ORAL DAILY PRN
Status: DISCONTINUED | OUTPATIENT
Start: 2019-09-02 | End: 2019-09-05 | Stop reason: HOSPADM

## 2019-09-02 RX ADMIN — LIDOCAINE HYDROCHLORIDE,EPINEPHRINE BITARTRATE 15 MG: 10; .01 INJECTION, SOLUTION INFILTRATION; PERINEURAL at 20:45

## 2019-09-02 RX ADMIN — KETOROLAC TROMETHAMINE 15 MG: 30 INJECTION, SOLUTION INTRAMUSCULAR at 20:45

## 2019-09-02 RX ADMIN — MORPHINE SULFATE 10 MG: 10 INJECTION, SOLUTION INTRAMUSCULAR; INTRAVENOUS at 20:46

## 2019-09-02 RX ADMIN — SODIUM CHLORIDE 1000 ML: 900 INJECTION, SOLUTION INTRAVENOUS at 20:45

## 2019-09-02 RX ADMIN — CEFEPIME HYDROCHLORIDE 2 G: 2 INJECTION, POWDER, FOR SOLUTION INTRAVENOUS at 22:40

## 2019-09-02 NOTE — ED PROVIDER NOTES
54 yo female presents to ER for evaluation of muscle pain. Pain began yesterday in left leg/hip area. Today pain has moved to other leg and right shoulder. Pt is iv drug user. No fever, chills, chest pain, shortness of breath. No abdominal pain, dysuria, frequency or urgency. No hematuria. No cough or uri symptoms. Pt took tylenol with acetaminophen today with no relief. Pain described as aching. Pt also reporting stiffness to fingers. Shoulder is aching pain. Pain 10/10. Social hx:  IV heroin user  +smoker    The history is provided by the patient. Muscle Pain    Pertinent negatives include no back pain and no neck pain. Past Medical History:   Diagnosis Date    Hepatitis C infection     Smoker        Past Surgical History:   Procedure Laterality Date    BREAST SURGERY PROCEDURE UNLISTED      lumpectomy    HX GYN               No family history on file.     Social History     Socioeconomic History    Marital status: SINGLE     Spouse name: Not on file    Number of children: Not on file    Years of education: Not on file    Highest education level: Not on file   Occupational History    Not on file   Social Needs    Financial resource strain: Not on file    Food insecurity:     Worry: Not on file     Inability: Not on file    Transportation needs:     Medical: Not on file     Non-medical: Not on file   Tobacco Use    Smoking status: Current Every Day Smoker     Packs/day: 1.00    Smokeless tobacco: Never Used   Substance and Sexual Activity    Alcohol use: No     Frequency: Never    Drug use: Yes     Types: Prescription     Comment: pt is on methadone    Sexual activity: Not on file   Lifestyle    Physical activity:     Days per week: Not on file     Minutes per session: Not on file    Stress: Not on file   Relationships    Social connections:     Talks on phone: Not on file     Gets together: Not on file     Attends Hindu service: Not on file     Active member of club or organization: Not on file     Attends meetings of clubs or organizations: Not on file     Relationship status: Not on file    Intimate partner violence:     Fear of current or ex partner: Not on file     Emotionally abused: Not on file     Physically abused: Not on file     Forced sexual activity: Not on file   Other Topics Concern    Not on file   Social History Narrative    Not on file         ALLERGIES: Patient has no known allergies. Review of Systems   Constitutional: Negative for chills and fever. HENT: Negative for congestion. Respiratory: Negative for cough, chest tightness and shortness of breath. Cardiovascular: Negative for chest pain and palpitations. Gastrointestinal: Negative for abdominal pain, nausea and vomiting. Genitourinary: Negative for difficulty urinating, dysuria and hematuria. Musculoskeletal: Negative for back pain and neck pain. Skin: Positive for color change and wound. Neurological: Negative for dizziness, weakness, light-headedness and headaches. All other systems reviewed and are negative. Vitals:    09/02/19 1839   Pulse: 98   SpO2: 99%            Physical Exam   Constitutional: She is oriented to person, place, and time. She appears well-developed and well-nourished. No distress. HENT:   Head: Normocephalic and atraumatic. Eyes: Pupils are equal, round, and reactive to light. Conjunctivae are normal.   Neck: Normal range of motion. Cardiovascular: Normal rate and regular rhythm. No murmur heard. Pulmonary/Chest: Effort normal and breath sounds normal. No stridor. No respiratory distress. She has no wheezes. She has no rales. Abdominal: Soft. She exhibits no distension and no mass. There is no tenderness. There is no rebound and no guarding. No hernia. Musculoskeletal: Normal range of motion. Left upper leg: scabbed healing abscess  Papular lesions to upper anterior legs    Left forearm: distal radial aspect redness, mild warmth.   No palpable fluctuance. Right shoulder; no redness, warmth or swelling. Pt diffusely tenderness. Pt guarding shoulder. Pt with pain at attempted movement. Pain with passive range of motion. 2+ radial pulse. Neurological: She is alert and oriented to person, place, and time. No cranial nerve deficit. She exhibits normal muscle tone. Coordination normal.   Skin: Skin is warm and dry. Psychiatric: She has a normal mood and affect. Her behavior is normal. Judgment and thought content normal.   Nursing note and vitals reviewed. MDM  Number of Diagnoses or Management Options  IV drug user:   Leukocytosis, unspecified type:   Pain in joint of right shoulder:   Diagnosis management comments: 81-year-old female presenting to the emergency room for right shoulder pain as well as leg pain. She is an injection drug user. He is guarding right shoulder and has pain with passive range of motion. She is afebrile. She is uncomfortable but nontoxic appearing. Her lungs are clear. Abdomen is soft. She is not tachycardic or hypotensive. There is concern for septic joint. Plan: X-ray, labs, likely joint aspiration, pain control, likely admission    9:45 PM  Pt case including HPI, PE, and all available lab and radiology results has been discussed with orthopedic attending Dr. Thuan Higgins. He recommends mri to shoulder. He is ok with antibiotics. I have spoken with hospitalist Dr. Juliet Parish. Discussed patient's presentation, history, physical assessment, and available diagnostic results. They will write orders and admit the patient to the hospital. All available results have been reviewed with the patient and any available family. Care plan has been outlined and questions have been answered. Patient and any available family understands and agrees to need for admission to hospital for further treatment not available in ED. Patient is ready for admission.                 Amount and/or Complexity of Data Reviewed  Discuss the patient with other providers: yes (ER attending-jorje)           Procedures    Hospitalist John for Admission  9:44 PM    ED Room Number: ER05/05  Patient Name and age:  Arthur Romero 53 y.o.  female  Working Diagnosis:   1. IV drug user    2. Pain in joint of right shoulder    3. Leukocytosis, unspecified type      Readmission: no  Isolation Requirements:  no  Recommended Level of Care:  med/surg  Code Status:  Full Code  Department:[facility] Adult ED - 21   Other:  2 gram day injection heroin user. Leg and shoulder pain. Will not range of motion shoulder. Dry tap. Wbc 18, elevated sed rate and crp. Ck normal. Xray normal.  Getting vanc and cefepime. Spoke with ortho Dr. Shea Bedolla. Recommends MRI. Pt has been seen and evaluated by attending physician who has reviewed lab work and imaging tests.

## 2019-09-02 NOTE — ED TRIAGE NOTES
Pt presents to the ED with muscular pain. Pt reports pain in her joints especially right arm. Pt denies injury or trauma. Pt denies cough, N/V/D, chest pain or SOB.

## 2019-09-03 ENCOUNTER — ANESTHESIA (OUTPATIENT)
Dept: SURGERY | Age: 49
DRG: 549 | End: 2019-09-03
Payer: SELF-PAY

## 2019-09-03 ENCOUNTER — ANESTHESIA EVENT (OUTPATIENT)
Dept: SURGERY | Age: 49
DRG: 549 | End: 2019-09-03
Payer: SELF-PAY

## 2019-09-03 LAB
ALBUMIN SERPL-MCNC: 2.9 G/DL (ref 3.5–5)
ALBUMIN/GLOB SERPL: 0.6 {RATIO} (ref 1.1–2.2)
ALP SERPL-CCNC: 115 U/L (ref 45–117)
ALT SERPL-CCNC: 41 U/L (ref 12–78)
AMPHET UR QL SCN: NEGATIVE
ANION GAP SERPL CALC-SCNC: 8 MMOL/L (ref 5–15)
AST SERPL-CCNC: 37 U/L (ref 15–37)
BARBITURATES UR QL SCN: NEGATIVE
BASOPHILS # BLD: 0 K/UL (ref 0–0.1)
BASOPHILS NFR BLD: 0 % (ref 0–1)
BENZODIAZ UR QL: NEGATIVE
BILIRUB SERPL-MCNC: 0.6 MG/DL (ref 0.2–1)
BODY FLD TYPE: NORMAL
BUN SERPL-MCNC: 11 MG/DL (ref 6–20)
BUN/CREAT SERPL: 13 (ref 12–20)
CALCIUM SERPL-MCNC: 8.8 MG/DL (ref 8.5–10.1)
CANNABINOIDS UR QL SCN: NEGATIVE
CHLORIDE SERPL-SCNC: 105 MMOL/L (ref 97–108)
CHOLEST SERPL-MCNC: 144 MG/DL
CO2 SERPL-SCNC: 20 MMOL/L (ref 21–32)
COCAINE UR QL SCN: POSITIVE
COMMENT, HOLDF: NORMAL
CREAT SERPL-MCNC: 0.84 MG/DL (ref 0.55–1.02)
CRYSTALS FLD MICRO: NEGATIVE
DIFFERENTIAL METHOD BLD: ABNORMAL
DRUG SCRN COMMENT,DRGCM: ABNORMAL
EOSINOPHIL # BLD: 0 K/UL (ref 0–0.4)
EOSINOPHIL NFR BLD: 0 % (ref 0–7)
ERYTHROCYTE [DISTWIDTH] IN BLOOD BY AUTOMATED COUNT: 14.4 % (ref 11.5–14.5)
GLOBULIN SER CALC-MCNC: 4.5 G/DL (ref 2–4)
GLUCOSE SERPL-MCNC: 143 MG/DL (ref 65–100)
HCT VFR BLD AUTO: 38.7 % (ref 35–47)
HDLC SERPL-MCNC: 29 MG/DL
HDLC SERPL: 5 {RATIO} (ref 0–5)
HGB BLD-MCNC: 12.8 G/DL (ref 11.5–16)
IMM GRANULOCYTES # BLD AUTO: 0 K/UL
IMM GRANULOCYTES NFR BLD AUTO: 0 %
LDLC SERPL CALC-MCNC: 84.2 MG/DL (ref 0–100)
LIPID PROFILE,FLP: ABNORMAL
LYMPHOCYTES # BLD: 0.7 K/UL (ref 0.8–3.5)
LYMPHOCYTES NFR BLD: 4 % (ref 12–49)
MCH RBC QN AUTO: 33 PG (ref 26–34)
MCHC RBC AUTO-ENTMCNC: 33.1 G/DL (ref 30–36.5)
MCV RBC AUTO: 99.7 FL (ref 80–99)
METHADONE UR QL: NEGATIVE
MONOCYTES # BLD: 1.1 K/UL (ref 0–1)
MONOCYTES NFR BLD: 6 % (ref 5–13)
NEUTS BAND NFR BLD MANUAL: 6 % (ref 0–6)
NEUTS SEG # BLD: 16.2 K/UL (ref 1.8–8)
NEUTS SEG NFR BLD: 84 % (ref 32–75)
NRBC # BLD: 0 K/UL (ref 0–0.01)
NRBC BLD-RTO: 0 PER 100 WBC
OPIATES UR QL: POSITIVE
PCP UR QL: NEGATIVE
PLATELET # BLD AUTO: 201 K/UL (ref 150–400)
PLATELET COMMENTS,PCOM: ABNORMAL
PMV BLD AUTO: 10.9 FL (ref 8.9–12.9)
POTASSIUM SERPL-SCNC: 3.8 MMOL/L (ref 3.5–5.1)
PROT SERPL-MCNC: 7.4 G/DL (ref 6.4–8.2)
RBC # BLD AUTO: 3.88 M/UL (ref 3.8–5.2)
RBC MORPH BLD: ABNORMAL
SAMPLES BEING HELD,HOLD: NORMAL
SODIUM SERPL-SCNC: 133 MMOL/L (ref 136–145)
TRIGL SERPL-MCNC: 154 MG/DL (ref ?–150)
VLDLC SERPL CALC-MCNC: 30.8 MG/DL
WBC # BLD AUTO: 18 K/UL (ref 3.6–11)

## 2019-09-03 PROCEDURE — 87205 SMEAR GRAM STAIN: CPT

## 2019-09-03 PROCEDURE — 65660000000 HC RM CCU STEPDOWN

## 2019-09-03 PROCEDURE — 74011250636 HC RX REV CODE- 250/636: Performed by: NURSE ANESTHETIST, CERTIFIED REGISTERED

## 2019-09-03 PROCEDURE — 77030011640 HC PAD GRND REM COVD -A: Performed by: ORTHOPAEDIC SURGERY

## 2019-09-03 PROCEDURE — 77030018835 HC SOL IRR LR ICUM -A: Performed by: ORTHOPAEDIC SURGERY

## 2019-09-03 PROCEDURE — 74011250636 HC RX REV CODE- 250/636: Performed by: PHYSICIAN ASSISTANT

## 2019-09-03 PROCEDURE — 74011250637 HC RX REV CODE- 250/637: Performed by: INTERNAL MEDICINE

## 2019-09-03 PROCEDURE — 80053 COMPREHEN METABOLIC PANEL: CPT

## 2019-09-03 PROCEDURE — 80307 DRUG TEST PRSMV CHEM ANLYZR: CPT

## 2019-09-03 PROCEDURE — 77030026438 HC STYL ET INTUB CARD -A: Performed by: ANESTHESIOLOGY

## 2019-09-03 PROCEDURE — 74011250636 HC RX REV CODE- 250/636: Performed by: EMERGENCY MEDICINE

## 2019-09-03 PROCEDURE — 76210000006 HC OR PH I REC 0.5 TO 1 HR: Performed by: ORTHOPAEDIC SURGERY

## 2019-09-03 PROCEDURE — 36415 COLL VENOUS BLD VENIPUNCTURE: CPT

## 2019-09-03 PROCEDURE — 74011250636 HC RX REV CODE- 250/636: Performed by: ORTHOPAEDIC SURGERY

## 2019-09-03 PROCEDURE — 0R9J4ZZ DRAINAGE OF RIGHT SHOULDER JOINT, PERCUTANEOUS ENDOSCOPIC APPROACH: ICD-10-PCS | Performed by: ORTHOPAEDIC SURGERY

## 2019-09-03 PROCEDURE — 76010000149 HC OR TIME 1 TO 1.5 HR: Performed by: ORTHOPAEDIC SURGERY

## 2019-09-03 PROCEDURE — 77030016678 HC BUR SHV4 S&N -B: Performed by: ORTHOPAEDIC SURGERY

## 2019-09-03 PROCEDURE — 36573 INSJ PICC RS&I 5 YR+: CPT | Performed by: NURSE PRACTITIONER

## 2019-09-03 PROCEDURE — 0S9D4ZZ DRAINAGE OF LEFT KNEE JOINT, PERCUTANEOUS ENDOSCOPIC APPROACH: ICD-10-PCS | Performed by: ORTHOPAEDIC SURGERY

## 2019-09-03 PROCEDURE — 77030002916 HC SUT ETHLN J&J -A: Performed by: ORTHOPAEDIC SURGERY

## 2019-09-03 PROCEDURE — 77030008496 HC TBNG ARTHSC IRR S&N -B: Performed by: ORTHOPAEDIC SURGERY

## 2019-09-03 PROCEDURE — 0R9P3ZZ DRAINAGE OF LEFT WRIST JOINT, PERCUTANEOUS APPROACH: ICD-10-PCS | Performed by: ORTHOPAEDIC SURGERY

## 2019-09-03 PROCEDURE — 74011000250 HC RX REV CODE- 250: Performed by: NURSE ANESTHETIST, CERTIFIED REGISTERED

## 2019-09-03 PROCEDURE — 85025 COMPLETE CBC W/AUTO DIFF WBC: CPT

## 2019-09-03 PROCEDURE — 89060 EXAM SYNOVIAL FLUID CRYSTALS: CPT

## 2019-09-03 PROCEDURE — 76060000034 HC ANESTHESIA 1.5 TO 2 HR: Performed by: ORTHOPAEDIC SURGERY

## 2019-09-03 PROCEDURE — 0S9D3ZZ DRAINAGE OF LEFT KNEE JOINT, PERCUTANEOUS APPROACH: ICD-10-PCS | Performed by: PHYSICIAN ASSISTANT

## 2019-09-03 PROCEDURE — 80061 LIPID PANEL: CPT

## 2019-09-03 PROCEDURE — 77030020782 HC GWN BAIR PAWS FLX 3M -B

## 2019-09-03 PROCEDURE — 74011000258 HC RX REV CODE- 258: Performed by: INTERNAL MEDICINE

## 2019-09-03 PROCEDURE — 77030010428: Performed by: ORTHOPAEDIC SURGERY

## 2019-09-03 PROCEDURE — 74011250637 HC RX REV CODE- 250/637: Performed by: HOSPITALIST

## 2019-09-03 PROCEDURE — 74011250636 HC RX REV CODE- 250/636: Performed by: INTERNAL MEDICINE

## 2019-09-03 PROCEDURE — 77030039266 HC ADH SKN EXOFIN S2SG -A: Performed by: ORTHOPAEDIC SURGERY

## 2019-09-03 PROCEDURE — 89050 BODY FLUID CELL COUNT: CPT

## 2019-09-03 PROCEDURE — 77030008684 HC TU ET CUF COVD -B: Performed by: ANESTHESIOLOGY

## 2019-09-03 RX ORDER — ONDANSETRON 2 MG/ML
INJECTION INTRAMUSCULAR; INTRAVENOUS AS NEEDED
Status: DISCONTINUED | OUTPATIENT
Start: 2019-09-03 | End: 2019-09-03 | Stop reason: HOSPADM

## 2019-09-03 RX ORDER — ACETAMINOPHEN 325 MG/1
650 TABLET ORAL 3 TIMES DAILY
Status: DISCONTINUED | OUTPATIENT
Start: 2019-09-04 | End: 2019-09-05 | Stop reason: HOSPADM

## 2019-09-03 RX ORDER — METHADONE HYDROCHLORIDE 10 MG/1
30 TABLET ORAL ONCE
Status: COMPLETED | OUTPATIENT
Start: 2019-09-03 | End: 2019-09-03

## 2019-09-03 RX ORDER — SODIUM CHLORIDE, SODIUM LACTATE, POTASSIUM CHLORIDE, CALCIUM CHLORIDE 600; 310; 30; 20 MG/100ML; MG/100ML; MG/100ML; MG/100ML
INJECTION, SOLUTION INTRAVENOUS
Status: DISCONTINUED | OUTPATIENT
Start: 2019-09-03 | End: 2019-09-03 | Stop reason: HOSPADM

## 2019-09-03 RX ORDER — SUCCINYLCHOLINE CHLORIDE 20 MG/ML
INJECTION INTRAMUSCULAR; INTRAVENOUS AS NEEDED
Status: DISCONTINUED | OUTPATIENT
Start: 2019-09-03 | End: 2019-09-03 | Stop reason: HOSPADM

## 2019-09-03 RX ORDER — LIDOCAINE HYDROCHLORIDE 10 MG/ML
10 INJECTION INFILTRATION; PERINEURAL ONCE
Status: DISCONTINUED | OUTPATIENT
Start: 2019-09-03 | End: 2019-09-03 | Stop reason: SDUPTHER

## 2019-09-03 RX ORDER — MIDAZOLAM HYDROCHLORIDE 1 MG/ML
INJECTION, SOLUTION INTRAMUSCULAR; INTRAVENOUS AS NEEDED
Status: DISCONTINUED | OUTPATIENT
Start: 2019-09-03 | End: 2019-09-03 | Stop reason: HOSPADM

## 2019-09-03 RX ORDER — OXYCODONE HYDROCHLORIDE 5 MG/1
5 TABLET ORAL
Status: DISCONTINUED | OUTPATIENT
Start: 2019-09-03 | End: 2019-09-05 | Stop reason: HOSPADM

## 2019-09-03 RX ORDER — PROPOFOL 10 MG/ML
INJECTION, EMULSION INTRAVENOUS AS NEEDED
Status: DISCONTINUED | OUTPATIENT
Start: 2019-09-03 | End: 2019-09-03 | Stop reason: HOSPADM

## 2019-09-03 RX ORDER — METHADONE HYDROCHLORIDE 10 MG/1
15 TABLET ORAL DAILY
Status: DISCONTINUED | OUTPATIENT
Start: 2019-09-04 | End: 2019-09-03 | Stop reason: ALTCHOICE

## 2019-09-03 RX ORDER — FENTANYL CITRATE 50 UG/ML
INJECTION, SOLUTION INTRAMUSCULAR; INTRAVENOUS AS NEEDED
Status: DISCONTINUED | OUTPATIENT
Start: 2019-09-03 | End: 2019-09-03 | Stop reason: HOSPADM

## 2019-09-03 RX ORDER — DEXAMETHASONE SODIUM PHOSPHATE 4 MG/ML
INJECTION, SOLUTION INTRA-ARTICULAR; INTRALESIONAL; INTRAMUSCULAR; INTRAVENOUS; SOFT TISSUE AS NEEDED
Status: DISCONTINUED | OUTPATIENT
Start: 2019-09-03 | End: 2019-09-03 | Stop reason: HOSPADM

## 2019-09-03 RX ORDER — ROCURONIUM BROMIDE 10 MG/ML
INJECTION, SOLUTION INTRAVENOUS AS NEEDED
Status: DISCONTINUED | OUTPATIENT
Start: 2019-09-03 | End: 2019-09-03 | Stop reason: HOSPADM

## 2019-09-03 RX ORDER — LIDOCAINE HYDROCHLORIDE 10 MG/ML
10 INJECTION, SOLUTION EPIDURAL; INFILTRATION; INTRACAUDAL; PERINEURAL ONCE
Status: COMPLETED | OUTPATIENT
Start: 2019-09-03 | End: 2019-09-03

## 2019-09-03 RX ORDER — LIDOCAINE HYDROCHLORIDE 20 MG/ML
INJECTION, SOLUTION EPIDURAL; INFILTRATION; INTRACAUDAL; PERINEURAL AS NEEDED
Status: DISCONTINUED | OUTPATIENT
Start: 2019-09-03 | End: 2019-09-03 | Stop reason: HOSPADM

## 2019-09-03 RX ORDER — LORAZEPAM 1 MG/1
1 TABLET ORAL
Status: DISCONTINUED | OUTPATIENT
Start: 2019-09-03 | End: 2019-09-05

## 2019-09-03 RX ORDER — KETOROLAC TROMETHAMINE 30 MG/ML
15 INJECTION, SOLUTION INTRAMUSCULAR; INTRAVENOUS
Status: DISPENSED | OUTPATIENT
Start: 2019-09-04 | End: 2019-09-05

## 2019-09-03 RX ORDER — NEOSTIGMINE METHYLSULFATE 1 MG/ML
INJECTION INTRAVENOUS AS NEEDED
Status: DISCONTINUED | OUTPATIENT
Start: 2019-09-03 | End: 2019-09-03 | Stop reason: HOSPADM

## 2019-09-03 RX ORDER — GLYCOPYRROLATE 0.2 MG/ML
INJECTION INTRAMUSCULAR; INTRAVENOUS AS NEEDED
Status: DISCONTINUED | OUTPATIENT
Start: 2019-09-03 | End: 2019-09-03 | Stop reason: HOSPADM

## 2019-09-03 RX ADMIN — GLYCOPYRROLATE 0.4 MG: 0.2 INJECTION, SOLUTION INTRAMUSCULAR; INTRAVENOUS at 16:15

## 2019-09-03 RX ADMIN — METHADONE HYDROCHLORIDE 30 MG: 10 TABLET ORAL at 07:37

## 2019-09-03 RX ADMIN — SODIUM CHLORIDE 125 ML/HR: 900 INJECTION, SOLUTION INTRAVENOUS at 17:58

## 2019-09-03 RX ADMIN — VANCOMYCIN HYDROCHLORIDE 1250 MG: 10 INJECTION, POWDER, LYOPHILIZED, FOR SOLUTION INTRAVENOUS at 00:03

## 2019-09-03 RX ADMIN — KETOROLAC TROMETHAMINE 15 MG: 30 INJECTION, SOLUTION INTRAMUSCULAR at 18:57

## 2019-09-03 RX ADMIN — POTASSIUM CHLORIDE 10 MEQ: 200 INJECTION, SOLUTION INTRAVENOUS at 03:13

## 2019-09-03 RX ADMIN — KETOROLAC TROMETHAMINE 15 MG: 30 INJECTION, SOLUTION INTRAMUSCULAR at 12:51

## 2019-09-03 RX ADMIN — DEXAMETHASONE SODIUM PHOSPHATE 4 MG: 4 INJECTION, SOLUTION INTRAMUSCULAR; INTRAVENOUS at 16:15

## 2019-09-03 RX ADMIN — HEPARIN SODIUM 5000 UNITS: 5000 INJECTION INTRAVENOUS; SUBCUTANEOUS at 00:55

## 2019-09-03 RX ADMIN — ONDANSETRON HYDROCHLORIDE 4 MG: 2 INJECTION, SOLUTION INTRAMUSCULAR; INTRAVENOUS at 16:15

## 2019-09-03 RX ADMIN — POTASSIUM CHLORIDE 10 MEQ: 200 INJECTION, SOLUTION INTRAVENOUS at 02:11

## 2019-09-03 RX ADMIN — CEFEPIME HYDROCHLORIDE 2 G: 2 INJECTION, POWDER, FOR SOLUTION INTRAVENOUS at 11:20

## 2019-09-03 RX ADMIN — SODIUM CHLORIDE 125 ML/HR: 900 INJECTION, SOLUTION INTRAVENOUS at 00:04

## 2019-09-03 RX ADMIN — SODIUM CHLORIDE, POTASSIUM CHLORIDE, SODIUM LACTATE AND CALCIUM CHLORIDE: 600; 310; 30; 20 INJECTION, SOLUTION INTRAVENOUS at 15:03

## 2019-09-03 RX ADMIN — LIDOCAINE HYDROCHLORIDE 60 MG: 20 INJECTION, SOLUTION EPIDURAL; INFILTRATION; INTRACAUDAL; PERINEURAL at 15:11

## 2019-09-03 RX ADMIN — OXYCODONE HYDROCHLORIDE 5 MG: 5 TABLET ORAL at 22:16

## 2019-09-03 RX ADMIN — ROCURONIUM BROMIDE 25 MG: 10 SOLUTION INTRAVENOUS at 15:22

## 2019-09-03 RX ADMIN — MIDAZOLAM 2 MG: 1 INJECTION INTRAMUSCULAR; INTRAVENOUS at 15:03

## 2019-09-03 RX ADMIN — FENTANYL CITRATE 50 MCG: 50 INJECTION, SOLUTION INTRAMUSCULAR; INTRAVENOUS at 15:40

## 2019-09-03 RX ADMIN — NEOSTIGMINE METHYLSULFATE 3 MG: 1 INJECTION, SOLUTION INTRAMUSCULAR; INTRAVENOUS; SUBCUTANEOUS at 16:15

## 2019-09-03 RX ADMIN — FENTANYL CITRATE 100 MCG: 50 INJECTION, SOLUTION INTRAMUSCULAR; INTRAVENOUS at 15:11

## 2019-09-03 RX ADMIN — HEPARIN SODIUM 5000 UNITS: 5000 INJECTION INTRAVENOUS; SUBCUTANEOUS at 07:37

## 2019-09-03 RX ADMIN — POTASSIUM CHLORIDE 10 MEQ: 200 INJECTION, SOLUTION INTRAVENOUS at 04:14

## 2019-09-03 RX ADMIN — ROCURONIUM BROMIDE 10 MG: 10 SOLUTION INTRAVENOUS at 15:41

## 2019-09-03 RX ADMIN — ROCURONIUM BROMIDE 5 MG: 10 SOLUTION INTRAVENOUS at 15:11

## 2019-09-03 RX ADMIN — PROPOFOL 150 MG: 10 INJECTION, EMULSION INTRAVENOUS at 15:11

## 2019-09-03 RX ADMIN — FENTANYL CITRATE 50 MCG: 50 INJECTION, SOLUTION INTRAMUSCULAR; INTRAVENOUS at 15:50

## 2019-09-03 RX ADMIN — SUCCINYLCHOLINE CHLORIDE 120 MG: 20 INJECTION, SOLUTION INTRAMUSCULAR; INTRAVENOUS at 15:11

## 2019-09-03 RX ADMIN — KETOROLAC TROMETHAMINE 15 MG: 30 INJECTION, SOLUTION INTRAMUSCULAR at 05:41

## 2019-09-03 RX ADMIN — VANCOMYCIN HYDROCHLORIDE 1000 MG: 1 INJECTION, POWDER, LYOPHILIZED, FOR SOLUTION INTRAVENOUS at 12:51

## 2019-09-03 RX ADMIN — Medication 10 ML: at 00:08

## 2019-09-03 RX ADMIN — LIDOCAINE HYDROCHLORIDE 10 ML: 10 INJECTION, SOLUTION EPIDURAL; INFILTRATION; INTRACAUDAL; PERINEURAL at 11:00

## 2019-09-03 RX ADMIN — FENTANYL CITRATE 50 MCG: 50 INJECTION, SOLUTION INTRAMUSCULAR; INTRAVENOUS at 16:26

## 2019-09-03 NOTE — ROUTINE PROCESS
TRANSFER - OUT REPORT:    Verbal report given to Staff RN(name) on Maxx Alcazar  being transferred to (unit) for routine progression of care       Report consisted of patients Situation, Background, Assessment and   Recommendations(SBAR). Information from the following report(s) SBAR, Kardex, ED Summary, [facility] and Recent Results was reviewed with the receiving nurse. Lines:   Peripheral IV 09/02/19 Left Antecubital (Active)   Site Assessment Clean, dry, & intact 9/2/2019 11:11 PM   Phlebitis Assessment 0 9/2/2019 11:11 PM   Infiltration Assessment 0 9/2/2019 11:11 PM        Opportunity for questions and clarification was provided.       Patient transported with:   [employer]

## 2019-09-03 NOTE — ROUTINE PROCESS
Patient: Keke Mercado MRN: [id number]  SSN: [id number]   YOB: 1970  Age: 53 y.o. Sex: female     Patient is status post Procedure(s):  arthroscopic I&D Left Knee  arthroscopic debridement and irrigation of  right shoulder. open I&D Left wrist.    Surgeon(s) and Role:     Wanda Macias MD - Primary    Local/Dose/Irrigation:                 Peripheral IV 09/02/19 Left Antecubital (Active)   Site Assessment Clean, dry, & intact 9/3/2019  8:30 AM   Phlebitis Assessment 0 9/3/2019  8:30 AM   Infiltration Assessment 0 9/3/2019  8:30 AM   Dressing Status Clean, dry, & intact 9/3/2019  8:30 AM   Dressing Type Transparent; Topical skin adhesive 9/3/2019  8:30 AM   Hub Color/Line Status Infusing 9/3/2019  8:30 AM   Action Taken Open ports on tubing capped 9/3/2019  8:30 AM   Alcohol Cap Used Yes 9/3/2019  8:30 AM                           Dressing/Packing:  Wound Knee-Dressing Type: 4 x 4;ABD pad;Cast padding;Elastic bandage;Non adherent (09/03/19 1618)  Wound Wrist Left-Dressing Type: 4 x 4;ABD pad;Cast padding;Elastic bandage;Non adherent (09/03/19 1618)  Wound Shoulder Right-Dressing Type: 4 x 4;ABD pad;Special tape (comment)(paper tape) (09/03/19 1618)    Splint/Cast:  ]    Other:

## 2019-09-03 NOTE — PROGRESS NOTES
Reason for Admission:   Generalized body aches                   RRAT Score:    8                  Plan for utilizing home health:    Not needed. Current Advanced Directive/Advance Care Plan: Not on file. Transition of Care Plan:  CM briefly met with pt to introduce her to CM role. She was crying and complaining of pain. Pt no longer has medicaid per pt. She was independent prior to admission. CM will discuss a PCP with pt and [facility] when pt is feeling better. Will follow.  [address] Management Interventions  PCP Verified by CM: No  Palliative Care Criteria Met (RRAT>21 & CHF Dx)?: No  Transition of Care Consult (CM Consult): Discharge Planning  MyChart Signup: No  Discharge Durable Medical Equipment: No  Physical Therapy Consult: No  Occupational Therapy Consult: No  Speech Therapy Consult: No  Current Support Network: Lives Alone  Confirm Follow Up Transport: Family  Plan discussed with Pt/Family/Caregiver: Yes  Freedom of Choice Offered: Yes   Resource Information Provided?: No

## 2019-09-03 NOTE — ANESTHESIA PREPROCEDURE EVALUATION
Relevant Problems   No relevant active problems       Anesthetic History               Review of Systems / Medical History  Patient summary reviewed, nursing notes reviewed and pertinent labs reviewed    Pulmonary          Smoker         Neuro/Psych         Psychiatric history     Cardiovascular                  Exercise tolerance: >4 METS     GI/Hepatic/Renal       Hepatitis: type C         Endo/Other        Arthritis     Other Findings   Comments: IVDA, heroin         Physical Exam    Airway  Mallampati: I  TM Distance: > 6 cm    Mouth opening: Normal     Cardiovascular    Rhythm: regular  Rate: normal         Dental  No notable dental hx       Pulmonary  Breath sounds clear to auscultation               Abdominal         Other Findings            Anesthetic Plan    ASA: 3  Anesthesia type: general          Induction: Intravenous  Anesthetic plan and risks discussed with: Patient

## 2019-09-03 NOTE — PROGRESS NOTES
Primary Nurse Ray Eugene and Rosamaria Elizalde RN performed a dual skin assessment on this patient Impairment noted- see wound doc flow sheet  Manuel score is 22    Wound on left thigh. Redness on dorsal feet.

## 2019-09-03 NOTE — CONSULTS
ORTHO CONSULT NOTE    Date of Consultation:  September 3, 2019  Referring Physician:  Pola Garcia  CC: leg/arm pain. HPI:  Nelly Hunter is a 53 y.o. right hand dom female IVDU with I&D right hand MRSA tenosynovitis 19 who c/o right shoulder, left wrist, and bilat LE pain that started . Last IVDU yesterday. Noted left knee pain overnight. ER dry tap right shoulder. IV antibiotics started last night. Denies fever at home. Admits numerous superficial wounds BLE d/t injection sites. Past Medical History:   Diagnosis Date    Hepatitis C infection     Smoker       Past Surgical History:   Procedure Laterality Date    BREAST SURGERY PROCEDURE UNLISTED      lumpectomy    HX GYN            History reviewed. No pertinent family history. Social History     Tobacco Use    Smoking status: Current Every Day Smoker     Packs/day: 1.00    Smokeless tobacco: Never Used   Substance Use Topics    Alcohol use: No     Frequency: Never        No Known Allergies     Review of Systems:  Per HPI. Objective:     Patient Vitals for the past 8 hrs:   BP Temp Pulse Resp SpO2 Height   19 0841 111/75 98.6 °F (37 °C) 82 16 96 % --   19 0830 -- -- -- -- -- 5' 4\" (1.626 m)   19 0536 127/84 100.2 °F (37.9 °C) 88 16 98 % --     Temp (24hrs), Av.7 °F (37.1 °C), Min:97.7 °F (36.5 °C), Max:100.2 °F (37.9 °C)      EXAM:   Mild acute distress. Extensive track marks BLE. Tattoos LUE. RLE stiffness but no joint TTP and no effusions. Palp DP, Foot SILT. LLE stiffness. Obvious effusion left knee but no erythema. Palp DP and foot SILT. Left wrist dorsal erythema, swelling, TTP, and ROM limited by pain. CR digits brisk. Moves right wrist/elbow OK. Arm SILT, palp radial. Does not francisco shoulder PROM. Shoulder severe TTP and suspected effusion. No erythema.     Imaging Review:   Results from Hospital Encounter encounter on 19   XR SHOULDER RT AP/LAT MIN 2 V    Narrative EXAM: XR SHOULDER RT AP/LAT MIN 2 V    INDICATION: shoulder pain. COMPARISON: None. FINDINGS: 2 views of the right shoulder demonstrate no fracture, dislocation or  other acute abnormality. Impression IMPRESSION: No acute abnormality. No results found for this or any previous visit. Labs:   WBC 18, ESR 51, CRP 16.9. BUN 11, Cr 0.84, eGFR > 60. Tox + cocaine, opiates. Impression:     Patient Active Problem List    Diagnosis Date Noted    Pyogenic arthritis of right shoulder region ([facility] [address] 09/02/2019    Septic arthritis ([facility] [address] 09/02/2019    Infection of hand 02/22/2019    Cellulitis of hand 02/19/2019     Principal Problem:    Pyogenic arthritis of right shoulder region [facility]) (9/2/2019)    Active Problems:    Septic arthritis ([facility] [address] (9/2/2019)      Concern for septic joints right shoulder, left wrist, and left knee. Plan:   I explained concern for septic joints and need for bedside aspiration. Risks/benefits explained and patient consents. Based on gross appearance of fluid, will post for OR. Patient had some breakfast 08:30 so earliest anesthesia 14:30. Discussed risks/benfits of surgery and patient consents. Plan for arthroscopic I&D left knee, aspiration with possible arthroscopic I&D right shoulder, aspiration with possible open I&D left wrist.    Send knee aspirate for stat cell count, crystals, gr stain, and culture. NPO. ID consult. Consent signed by patient left hand given right shoulder pain. Dr. Henri Zuniga is aware and agrees with above plan. TIM Taylor  Orthopedic Trauma Service  [address]    Agree with above. Patient seen and examined.   Given purulence of knee will take to OR for arthroscopic I and D of right shoulder, left knee and open I and d of wrist.

## 2019-09-03 NOTE — H&P
[facility]  HISTORY AND PHYSICAL    Name:  Laura Camacho  MR#:  [id number]  :  1970  ACCOUNT #:  [de-identified]  ADMIT DATE:  2019    The patient was seen, evaluated and admitted by me on 2019. PRIMARY CARE PHYSICIAN:  None. SOURCE OF INFORMATION:  The patient. CHIEF COMPLAINT:  Generalized body aches and pain. HISTORY OF PRESENT ILLNESS:  This is a 21-year-old woman with a past medical history significant for hepatitis C infection, intravenous drug abuse, was in her usual state of health until the day of her presentation at the emergency room when the patient developed generalized body aches and pain. The patient is complaining of pain, specifically in the right shoulder. According to the patient, the pain is constant, 10/10 in severity, worse with any movement involving the right shoulder, constant, sharp pain. No known relieving factors. The patient has been injecting heroin actively on daily basis. She also complained of pain and swelling involving left thigh, which was the site of drug injection, but the patient stated that she did not directly inject drug into the right shoulder. She came to the emergency room because of worsening symptoms. The patient was last admitted to this hospital from 2019 to 2019. The patient was admitted with right middle finger infection, secondary to methicillin-resistant Staphylococcus aureus. During that hospitalization, the patient was seen by the Orthopedic Service, underwent incision and drainage of the right middle finger abscess. She was also seen by Infectious Disease consultant during that hospitalization. The patient was discharged home in stable condition on antibiotics. She was also advised to follow up with hepatologist for hepatitis C infection. When the patient arrived at the emergency room, attempted arthrocentesis of the right shoulder was performed. It was a dry tap.   She was started on antibiotics and was referred to the hospitalist service for evaluation for admission. The emergency room physician also consulted the orthopedic service on call. Admission to the hospital was advised. MRI of the right shoulder was advised to evaluate the patient for suspected septic arthritis. PAST MEDICAL HISTORY:  1. Intravenous drug abuse. 2.  Hepatitis C infection. ALLERGIES:  NO KNOWN DRUG ALLERGIES. CURRENT MEDICATION:  Methadone 20 mg daily. FAMILY HISTORY:  This was reviewed. Her mother has COPD. Father has diabetes. PAST SURGICAL HISTORY:  This is significant for incision and drainage of the right middle finger abscess,  section and breast lumpectomy. SOCIAL HISTORY:  The patient smokes about a pack of cigarettes daily. Denies alcohol abuse, but admits to use of illicit drugs. REVIEW OS SYSTEMS:  HEAD, EYES, EARS, NOSE AND THROAT:  No headache, no dizziness, no blurring of vision, no photophobia. RESPIRATORY SYSTEM:  No cough, no shortness of breath, no hemoptysis. CARDIOVASCULAR SYSTEM:  No chest pain, no orthopnea, no palpitation. GASTROINTESTINAL SYSTEM:  No nausea or vomiting. No diarrhea. No constipation. GENITOURINARY SYSTEM:  No dysuria, no urgency and no frequency. All other systems are reviewed and they are negative. PHYSICAL EXAMINATION:  GENERAL APPEARANCE:  The patient appeared ill in moderate distress. VITAL SIGNS:  On arrival at the emergency room, temperature 97.7, pulse 98, respiratory rate 16, blood pressure 114/77, oxygen saturation 99% on room air. HEAD:  Normocephalic, atraumatic. EYES:  Normal eye movements. No redness, no drainage, no discharge. EARS:  Normal external ears with no evidence of drainage. NOSE:  No deformity. No drainage. MOUTH AND THROAT:  No visible oral lesion. Dry oral mucosa. NECK:  Neck is supple. No JVD, no thyromegaly. CHEST:  Clear breath sounds. No wheezing, no crackles.   HEART:  Normal S1 and S2, regular. No clinically appreciable murmur. ABDOMEN:  Soft. Nontender. Normal bowel sounds. CNS:  Alert, oriented x3. No gross focal neurological deficit. EXTREMITIES:  No edema. Pulses 2+ bilaterally. MUSCULOSKELETAL SYSTEM:  Tenderness and reduced range of motion of the right shoulder with no obvious redness and swelling noted and present on admission. SKIN:  Scabbed, healing abscess, left upper thigh noted and present on admission. Mild redness involving the left forearm noted and present on admission. PSYCHIATRY:  Normal mood and affect. LYMPHATIC SYSTEM:  No cervical lymphadenopathy. DIAGNOSTIC DATA:  X-ray of the right shoulder, no acute abnormalities. LABORATORY DATA:  Hematology:  WBC 18.0, hemoglobin at 13.7, hematocrit 41.4, platelets 056. Sedimentation rate 51. Chemistry:  Sodium 130, potassium 3.4, chloride 98, CO2 of 24, glucose 90, BUN 12, creatinine 0.79, calcium 9.3, total bilirubin 0.8, ALT 47, AST 42, alkaline phosphatase 121, total protein 8.4, albumin level 3.5, globulin at 4.9.  C-reactive protein level 16.90. Lactic acid level 0.42.    ASSESSMENT:  1. Suspected septic arthritis, right shoulder. 2.  Intravenous drug abuse. 3.  Hepatitis C infection. 4.  Tobacco abuse. 5.  Hyponatremia. 6.  Hypokalemia. PLAN:  1. Suspected septic arthritis, right shoulder. The patient is an active intravenous drug abuser, at significant risk for infection. We will continue with cefepime and vancomycin, started in the emergency room. The patient has elevated inflammatory markers. This may support ongoing infection. We will obtain an MRI of the right shoulder as advised by the orthopedic service. We will await further recommendation from the orthopedic service. 2.  Intravenous drug abuse. We will check urine drug screen. The patient advised to quit. This practice puts the patient at potential risk for infection. We will resume home methadone.   3.  Hepatitis C infection. We will continue to monitor the patient's liver function tests. The patient will require a followup with hepatologist upon discharge from the hospital.  4  Tobacco abuse. The patient advised to quit. She does not want to be placed on Nicoderm patch. 5.  Hyponatremia. This is mild. Most likely due to volume depletion. We will carry out fluid therapy and repeat sodium level. 6.  Hypokalemia. We will replace potassium and repeat her potassium level. We will also check magnesium level. OTHER ISSUES:  Code status, the patient is a full code. We will place the patient on heparin for DVT prophylaxis. FUNCTIONAL STATUS PRIOR TO ADMISSION:  The patient is ambulatory without assistant or device.       Shruti Banks MD      [id number]  D:  09/03/2019 5:41  T:  09/03/2019 5:48  JOB #:  [id number]

## 2019-09-03 NOTE — PROCEDURES
Ortho Procedure:    Explained risks/benefits of knee aspiration to patient who consents. Sterile technique, chlora-prep, injected 2 cc 1% pl lido for local.  Then repeat chlora-prep and aspirated 50 cc grossly cloudly fluid using lateral approach. Patient francisco well. Fluid sent for cell count, crystals, gr stain, and culture.     Venkata Carolina PA

## 2019-09-03 NOTE — PHYSICIAN ADVISORY
Letter of Status Determination: Current Status   INPATIENT is Appropriate         Pt Name:  Yusef Quinn   MR#  [id number]   [facility]#   [id number]   Room and Hospital  556/01  @ [facility]   Hospitalization date  9/2/2019  7:05 PM   Current Attending Physician  Virgel Goodell, MD   Principal diagnosis  Pyogenic arthritis of right shoulder region [facility])    Clinicals    This is a 28-year-old woman with a past medical history significant for hepatitis C infection, intravenous drug abuse, was in her usual state of health until the day of her presentation at the emergency room when the patient developed generalized body aches and pain. The patient is complaining of pain, specifically in the right shoulder. According to the patient, the pain is constant, 10/10 in severity, worse with any movement involving the right shoulder, constant, sharp pain. No known relieving factors. The patient has been injecting heroin actively on daily basis. She also complained of pain and swelling involving left thigh, which was the site of drug injection, but the patient stated that she did not directly inject drug into the right shoulder. She came to the emergency room because of worsening symptoms. The patient was last admitted to this hospital from 02/19/2019 to 03/06/2019. The patient was admitted with right middle finger infection, secondary to methicillin-resistant Staphylococcus aureus. During that hospitalization, the patient was seen by the Orthopedic Service, underwent incision and drainage of the right middle finger abscess. She was also seen by Infectious Disease consultant during that hospitalization. The patient was discharged home in stable condition on antibiotics. She was also advised to follow up with hepatologist for hepatitis C infection. When the patient arrived at the emergency room, attempted arthrocentesis of the right shoulder was performed. It was a dry tap.   Pt continues to have low grade fevers, elevated white count, on IV abx, high risk for occult infection, cultures pending,, on IV abx, NPO currently possible surgical intervention this am, needs medical optimization    [facility]   Does  NOT apply    STATUS DETERMINATION  On the basis of clinical data, available documentaion, we believe that the current status of this patient as INPATIENT is Appropriate     The final decision of the patient's hospitalization status depends on the attending physician's judgment    Additional comments     Insurance  Payor: SELF PAY / Plan: Emi Gonzalez / Product Type: Self Pay /    Insurance Information                SELF PAY/BSHSI SELF PAY Phone: [phone]    Subscriber: Dionna Owen Subscriber#: [id number]    Group#:  Precert#:                    Jacqueline Lemons MD  Cell: [phone]  Physician Advisor

## 2019-09-03 NOTE — ED NOTES
Procedure Note - Arthrocentesis:  R shoulder posterior approach  Performed by Maya Singh DO     Consent was obtained. Immediately prior to the procedure, the patient was reevaluated and found suitable for the planned procedure and any planned medications. Immediately prior to the procedure a time out was called to verify the correct patient, procedure, equipment, staff, and marking as appropriate. The site prepped with ChloraPrep and Sterile draping. Anesthesia was obtained with 1% lidocaine and with epinephrine. Joint was entered posteriorly and it was a dry tap. Attempted twice. Procedure was tolerated well.

## 2019-09-03 NOTE — PERIOP NOTES
TRANSFER - OUT REPORT:    Verbal report given to Camila(name) on Reza Barnett  being transferred to [facility](unit) for routine post - op       Report consisted of patients Situation, Background, Assessment and   Recommendations(SBAR). Time Pre op antibiotic given:na  Anesthesia Stop time: na  Hampton Present on Transfer to floor:no  Order for Hampton on Chart:no  Discharge Prescriptions with Chart:no    Information from the following report(s) SBAR, Kardex, OR Summary, Procedure Summary, Intake/Output and MAR was reviewed with the receiving nurse. Opportunity for questions and clarification was provided. Is the patient on 02? YES       L/Min 2       Other     Is the patient on a monitor? NO    Is the nurse transporting with the patient? NO    Surgical Waiting Area notified of patient's transfer from PACU? YES      The following personal items collected during your admission accompanied patient upon transfer:   Dental Appliance: Dental Appliances: None  Vision: Visual Aid: Glasses, With patient  Hearing Aid:    Jewelry: Jewelry: None  Clothing: Clothing: None  Other Valuables:  Other Valuables: None  Valuables sent to safe:

## 2019-09-03 NOTE — PROGRESS NOTES
Bedside and Verbal shift change report given to Ayesha Pedroza RN (oncoming nurse) by Brian Pink RN (offgoing nurse). Report included the following information SBAR, Kardex, Intake/Output, MAR and Recent Results.

## 2019-09-03 NOTE — BRIEF OP NOTE
BRIEF OPERATIVE NOTE    Date of Procedure: 9/3/2019   Preoperative Diagnosis: septic joints  Postoperative Diagnosis: septic joints    Procedure(s):  arthroscopic I&D Left Knee  arthroscopic debridement and irrigation of  right shoulder.   open I&D Left wrist  Surgeon(s) and Role:     * Salo Pulido MD - Primary         Surgical Assistant: none    Surgical Staff:  Circ-1: Smiley Sanchez RN  Scrub RN-1: Torres Mclaughlin RN  Surg Asst-1: Simran Louie RN  Event Time In Time Out   Incision Start 09/03/2019 1536    Incision Close 09/03/2019 1612      Anesthesia: General   Estimated Blood Loss: 5 cc  Specimens: * No specimens in log *   Findings: synovitis of all three joints    Complications: none  Implants: * No implants in log *

## 2019-09-03 NOTE — PROGRESS NOTES
Pharmacist Note - Vancomycin Dosing    Consult provided for this 53 y.o. female for indication of bone/joint infection. Antibiotic regimen(s): vancomycin    Recent Labs     19   WBC 18.0*   CREA 0.79   BUN 12     Frequency of BMP: daily x 3  Height: 162.6 cm  Weight: 58 kg  Est CrCl: 76 ml/min; UO: - ml/kg/hr  Temp (24hrs), Av.1 °F (36.7 °C), Min:97.7 °F (36.5 °C), Max:98.4 °F (36.9 °C)    Cultures:   paired blood- in process    Goal trough = 15 - 20 mcg/mL    Therapy will be initiated with a loading dose of 1250 mg IV x 1 to be followed by a maintenance dose of 1000 mg IV every 12 hours. Pharmacy to follow patient daily and order levels / make dose adjustments as appropriate.

## 2019-09-03 NOTE — ANESTHESIA POSTPROCEDURE EVALUATION
Procedure(s):  arthroscopic I&D Left Knee  arthroscopic debridement and irrigation of  right shoulder. open I&D Left wrist.    general    Anesthesia Post Evaluation      Multimodal analgesia: multimodal analgesia used between 6 hours prior to anesthesia start to PACU discharge  Patient location during evaluation: bedside  Patient participation: waiting for patient participation  Level of consciousness: awake  Pain management: adequate  Airway patency: patent  Anesthetic complications: no  Cardiovascular status: acceptable  Respiratory status: unassisted  Hydration status: acceptable  Comments: Post-Anesthesia Evaluation and Assessment    I have evaluated the patient and they are ready for PACU discharge. Patient: Mitra Beltran MRN: [id number]  SSN: [id number]   YOB: 1970  Age: 53 y.o. Sex: female      Cardiovascular Function/Vital Signs  /80   Pulse 64   Temp 36.6 °C (97.8 °F)   Resp 14   Ht 5' 4\" (1.626 m)   Wt 58 kg (127 lb 13.9 oz)   SpO2 93%   BMI 21.95 kg/m²     Patient is status post General anesthesia for Procedure(s):  arthroscopic I&D Left Knee  arthroscopic debridement and irrigation of  right shoulder. open I&D Left wrist.    Nausea/Vomiting: None    Postoperative hydration reviewed and adequate. Pain:  Pain Scale 1: Numeric (0 - 10) (09/03/19 1644)  Pain Intensity 1: 0 (09/03/19 1644)   Managed    Neurological Status:   Neuro (WDL): Within Defined Limits (09/03/19 1644)   At baseline    Mental Status, Level of Consciousness: Alert and  oriented to person, place, and time    Pulmonary Status:   O2 Device: Nasal cannula (09/03/19 1651)   Adequate oxygenation and airway patent    Complications related to anesthesia: None    Post-anesthesia assessment completed.  No concerns    Signed By: Jayy Anguiano MD    September 3, 2019                   Vitals Value Taken Time   /80 9/3/2019  5:00 PM   Temp 36.6 °C (97.8 °F) 9/3/2019  4:44 PM   Pulse 64 9/3/2019 5:13 PM   Resp 18 9/3/2019  5:13 PM   SpO2 89 % 9/3/2019  5:13 PM   Vitals shown include unvalidated device data.

## 2019-09-03 NOTE — PROGRESS NOTES
TRANSFER - IN REPORT:    Verbal report received from [address], RN(name) on Marcus Majano  being received from ED(unit) for routine progression of care      Report consisted of patients Situation, Background, Assessment and   Recommendations(SBAR). Information from the following report(s) SBAR, Kardex, ED Summary, Intake/Output, MAR and Recent Results was reviewed with the receiving nurse. Opportunity for questions and clarification was provided. Assessment completed upon patients arrival to unit and care assumed.

## 2019-09-04 ENCOUNTER — APPOINTMENT (OUTPATIENT)
Dept: MRI IMAGING | Age: 49
DRG: 549 | End: 2019-09-04
Attending: ORTHOPAEDIC SURGERY
Payer: SELF-PAY

## 2019-09-04 LAB
ANION GAP SERPL CALC-SCNC: 9 MMOL/L (ref 5–15)
APPEARANCE SNV: ABNORMAL
APTT PPP: 31.9 SEC (ref 22.1–32)
BACTERIA SPEC CULT: NORMAL
BACTERIA SPEC CULT: NORMAL
BASOPHILS # BLD: 0 K/UL (ref 0–0.1)
BASOPHILS NFR BLD: 0 % (ref 0–1)
BUN SERPL-MCNC: 12 MG/DL (ref 6–20)
BUN/CREAT SERPL: 15 (ref 12–20)
CALCIUM SERPL-MCNC: 8.5 MG/DL (ref 8.5–10.1)
CHLORIDE SERPL-SCNC: 106 MMOL/L (ref 97–108)
CO2 SERPL-SCNC: 23 MMOL/L (ref 21–32)
COLOR SNV: YELLOW
CREAT SERPL-MCNC: 0.8 MG/DL (ref 0.55–1.02)
DIFFERENTIAL METHOD BLD: ABNORMAL
EOSINOPHIL # BLD: 0 K/UL (ref 0–0.4)
EOSINOPHIL NFR BLD: 0 % (ref 0–7)
ERYTHROCYTE [DISTWIDTH] IN BLOOD BY AUTOMATED COUNT: 14.2 % (ref 11.5–14.5)
GLUCOSE SERPL-MCNC: 188 MG/DL (ref 65–100)
HCT VFR BLD AUTO: 33.7 % (ref 35–47)
HGB BLD-MCNC: 11.1 G/DL (ref 11.5–16)
HIV 1+2 AB+HIV1 P24 AG SERPL QL IA: NONREACTIVE
HIV12 RESULT COMMENT, HHIVC: NORMAL
IMM GRANULOCYTES # BLD AUTO: 0.2 K/UL (ref 0–0.04)
IMM GRANULOCYTES NFR BLD AUTO: 1 % (ref 0–0.5)
LYMPHOCYTES # BLD: 1.1 K/UL (ref 0.8–3.5)
LYMPHOCYTES NFR BLD: 7 % (ref 12–49)
MCH RBC QN AUTO: 32.9 PG (ref 26–34)
MCHC RBC AUTO-ENTMCNC: 32.9 G/DL (ref 30–36.5)
MCV RBC AUTO: 100 FL (ref 80–99)
MONOCYTES # BLD: 0.7 K/UL (ref 0–1)
MONOCYTES NFR BLD: 5 % (ref 5–13)
MONOCYTES NFR SNV MANUAL: 10 % (ref 0–65)
NEUTROPHILS NFR SNV MANUAL: 90 % (ref 0–20)
NEUTS SEG # BLD: 13.1 K/UL (ref 1.8–8)
NEUTS SEG NFR BLD: 87 % (ref 32–75)
NRBC # BLD: 0 K/UL (ref 0–0.01)
NRBC BLD-RTO: 0 PER 100 WBC
PLATELET # BLD AUTO: 159 K/UL (ref 150–400)
PMV BLD AUTO: 11.5 FL (ref 8.9–12.9)
POTASSIUM SERPL-SCNC: 3.6 MMOL/L (ref 3.5–5.1)
RBC # BLD AUTO: 3.37 M/UL (ref 3.8–5.2)
RBC # SNV: >100 /CU MM
SERVICE CMNT-IMP: NORMAL
SODIUM SERPL-SCNC: 138 MMOL/L (ref 136–145)
SPECIMEN SOURCE FLD: ABNORMAL
THERAPEUTIC RANGE,PTTT: NORMAL SECS (ref 58–77)
WBC # BLD AUTO: 15 K/UL (ref 3.6–11)
WBC # SNV: ABNORMAL /CU MM (ref 0–150)

## 2019-09-04 PROCEDURE — 74011250636 HC RX REV CODE- 250/636: Performed by: ORTHOPAEDIC SURGERY

## 2019-09-04 PROCEDURE — 77030020847 HC STATLOK BARD -A

## 2019-09-04 PROCEDURE — 87389 HIV-1 AG W/HIV-1&-2 AB AG IA: CPT

## 2019-09-04 PROCEDURE — 85025 COMPLETE CBC W/AUTO DIFF WBC: CPT

## 2019-09-04 PROCEDURE — 74011250637 HC RX REV CODE- 250/637: Performed by: HOSPITALIST

## 2019-09-04 PROCEDURE — 77030020365 HC SOL INJ SOD CL 0.9% 50ML

## 2019-09-04 PROCEDURE — 65660000000 HC RM CCU STEPDOWN

## 2019-09-04 PROCEDURE — 74011250636 HC RX REV CODE- 250/636: Performed by: HOSPITALIST

## 2019-09-04 PROCEDURE — 80048 BASIC METABOLIC PNL TOTAL CA: CPT

## 2019-09-04 PROCEDURE — 74011000258 HC RX REV CODE- 258: Performed by: ORTHOPAEDIC SURGERY

## 2019-09-04 PROCEDURE — C1751 CATH, INF, PER/CENT/MIDLINE: HCPCS

## 2019-09-04 PROCEDURE — 74011250637 HC RX REV CODE- 250/637: Performed by: ORTHOPAEDIC SURGERY

## 2019-09-04 PROCEDURE — 36415 COLL VENOUS BLD VENIPUNCTURE: CPT

## 2019-09-04 PROCEDURE — 76937 US GUIDE VASCULAR ACCESS: CPT

## 2019-09-04 PROCEDURE — 02HV33Z INSERTION OF INFUSION DEVICE INTO SUPERIOR VENA CAVA, PERCUTANEOUS APPROACH: ICD-10-PCS

## 2019-09-04 PROCEDURE — 74011250636 HC RX REV CODE- 250/636: Performed by: NURSE PRACTITIONER

## 2019-09-04 PROCEDURE — 74011000250 HC RX REV CODE- 250: Performed by: NURSE PRACTITIONER

## 2019-09-04 PROCEDURE — 85730 THROMBOPLASTIN TIME PARTIAL: CPT

## 2019-09-04 RX ORDER — OXYCODONE HYDROCHLORIDE 5 MG/1
5 TABLET ORAL ONCE
Status: ACTIVE | OUTPATIENT
Start: 2019-09-04 | End: 2019-09-04

## 2019-09-04 RX ORDER — METHADONE HYDROCHLORIDE 10 MG/1
15 TABLET ORAL DAILY
Status: DISCONTINUED | OUTPATIENT
Start: 2019-09-04 | End: 2019-09-04 | Stop reason: ALTCHOICE

## 2019-09-04 RX ORDER — METHADONE HYDROCHLORIDE 10 MG/1
20 TABLET ORAL ONCE
Status: COMPLETED | OUTPATIENT
Start: 2019-09-04 | End: 2019-09-04

## 2019-09-04 RX ORDER — LORAZEPAM 2 MG/ML
1 INJECTION INTRAMUSCULAR ONCE
Status: COMPLETED | OUTPATIENT
Start: 2019-09-04 | End: 2019-09-04

## 2019-09-04 RX ADMIN — Medication 10 ML: at 22:00

## 2019-09-04 RX ADMIN — Medication 10 ML: at 01:37

## 2019-09-04 RX ADMIN — VANCOMYCIN HYDROCHLORIDE 1000 MG: 1 INJECTION, POWDER, LYOPHILIZED, FOR SOLUTION INTRAVENOUS at 12:14

## 2019-09-04 RX ADMIN — OXYCODONE HYDROCHLORIDE 5 MG: 5 TABLET ORAL at 22:42

## 2019-09-04 RX ADMIN — ONDANSETRON 4 MG: 2 INJECTION INTRAMUSCULAR; INTRAVENOUS at 22:15

## 2019-09-04 RX ADMIN — ACETAMINOPHEN 650 MG: 325 TABLET, FILM COATED ORAL at 22:42

## 2019-09-04 RX ADMIN — METHADONE HYDROCHLORIDE 20 MG: 10 TABLET ORAL at 12:11

## 2019-09-04 RX ADMIN — KETOROLAC TROMETHAMINE 15 MG: 30 INJECTION, SOLUTION INTRAMUSCULAR at 18:00

## 2019-09-04 RX ADMIN — OXYCODONE HYDROCHLORIDE 5 MG: 5 TABLET ORAL at 04:36

## 2019-09-04 RX ADMIN — HEPARIN SODIUM 5000 UNITS: 5000 INJECTION INTRAVENOUS; SUBCUTANEOUS at 01:36

## 2019-09-04 RX ADMIN — OXYCODONE HYDROCHLORIDE 5 MG: 5 TABLET ORAL at 14:49

## 2019-09-04 RX ADMIN — OXYCODONE HYDROCHLORIDE 5 MG: 5 TABLET ORAL at 03:21

## 2019-09-04 RX ADMIN — CEFEPIME HYDROCHLORIDE 2 G: 2 INJECTION, POWDER, FOR SOLUTION INTRAVENOUS at 22:37

## 2019-09-04 RX ADMIN — LORAZEPAM: 2 INJECTION INTRAMUSCULAR; INTRAVENOUS at 22:37

## 2019-09-04 RX ADMIN — LORAZEPAM 1 MG: 1 TABLET ORAL at 01:36

## 2019-09-04 RX ADMIN — CEFEPIME HYDROCHLORIDE 2 G: 2 INJECTION, POWDER, FOR SOLUTION INTRAVENOUS at 11:00

## 2019-09-04 RX ADMIN — HEPARIN SODIUM 5000 UNITS: 5000 INJECTION INTRAVENOUS; SUBCUTANEOUS at 09:30

## 2019-09-04 RX ADMIN — SODIUM CHLORIDE 5 MG: 9 INJECTION INTRAMUSCULAR; INTRAVENOUS; SUBCUTANEOUS at 22:27

## 2019-09-04 RX ADMIN — ACETAMINOPHEN 650 MG: 325 TABLET, FILM COATED ORAL at 09:31

## 2019-09-04 RX ADMIN — KETOROLAC TROMETHAMINE 15 MG: 30 INJECTION, SOLUTION INTRAMUSCULAR at 09:31

## 2019-09-04 NOTE — PROGRESS NOTES
Bedside and Verbal shift change report given to Erica [facility] Joo (oncoming nurse) by Floridalma Salinas RN (offgoing nurse). Report included the following information SBAR, Kardex and MAR.

## 2019-09-04 NOTE — PROGRESS NOTES
Bedside and Verbal shift change report given to Eric Portillo RN (oncoming nurse) by [facility], RN (offgoing nurse). Report included the following information SBAR, Kardex, ED Summary, OR Summary, Procedure Summary, Intake/Output, MAR and Recent Results.

## 2019-09-04 NOTE — PROGRESS NOTES
TRANSFER - IN REPORT:    Verbal report received from Richard Angelo RN(name) on Chante Armstrong  being received from PACU(unit) for routine post - op      Report consisted of patients Situation, Background, Assessment and   Recommendations(SBAR). Information from the following report(s) SBAR, Kardex and MAR was reviewed with the receiving nurse. Opportunity for questions and clarification was provided. Assessment completed upon patients arrival to unit and care assumed.

## 2019-09-04 NOTE — PROGRESS NOTES
1051:  Notified Dr. Herber Aguirre that patient has repeatedly asked about her methadone and stated that she will not stay if she does not get some. Per Dr. Herber Aguirre she will go speak with patient. Will continue to monitor. 1959:  Bedside shift change report given to Erica Ge (oncoming nurse) by Matt Edwards (offgoing nurse). Report included the following information SBAR, Kardex, Intake/Output, MAR and Recent Results.

## 2019-09-04 NOTE — OP NOTES
[address]   OPERATIVE REPORT    Name:  Mir Landrum  MR#:  [id number]  :  1970  ACCOUNT #:  [de-identified]  DATE OF SERVICE:  2019      PREOPERATIVE DIAGNOSES:  Questionable septic arthritis of the right shoulder and left wrist, Gram-stain proven septic arthritis of the left knee. POSTOPERATIVE DIAGNOSES:  Questionable septic arthritis of the right shoulder and left wrist, Gram-stain proven septic arthritis of the left knee. PROCEDURES PERFORMED:  1. Right shoulder arthroscopic irrigation and debridement. 2.  Left knee arthroscopic irrigation and debridement. 3.  Left wrist open irrigation and debridement. SURGEON:  Amrit Whitney MD    ASSISTANT:  None. ANESTHESIA:  GETA. COMPLICATIONS:  None. SPECIMENS REMOVED:  None. IMPLANTS:  None. ESTIMATED BLOOD LOSS:  5 mL. JUSTIFICATION FOR PROCEDURE:  The patient is a 78-year-old IV drug user, identified to have significant shoulder, knee, and wrist pain. Shoulder was tapped in the ER, identified as a dry tap; however, continued to have significant pain. Regarding her knee, she did have aspiration, which revealed 50 mL of purulent fluid and grew Gram-negative bacillus. For this reason, she was brought to the operating room. PROCEDURE:  Prior to the surgery, the risks and benefits of the surgery were explained to the patient. These included but were not limited to bleeding, infection, nerve or vessel damage, complications from anesthesia, and need for additional surgery. Following this, the three joints were marked. The patient was then brought to the operating room where she was placed in the beach chair position. All three joints were prepped and draped in sterile fashion. A final time-out was taken to again identify the correct patient and site of surgery. Now, the right shoulder was initially approached. Aspiration attempts from posterior portal revealed no significant fluid.   There was a few mL of questionably turbid fluid, which was seen. For this reason, a posterior portal was made. Inspection of the joint did show significant tenosynovitis. There was labral tearing of the entire labrum, specifically at the biceps anchor. Now, an anterior portal was made and the shoulder was irrigated copiously. Treatment of the labrum was not done obviously because of questionable infection within the joint. At this point, the portals were closed with 3-0 nylon. Next, the left knee was approached. A cannula was placed into the lateral portal and under direct visualization, a medial portal was made. Similarly to the shoulder, there was a significant amount of synovitis, but no true purulence. This was again irrigated copiously arthroscopically. There was also no significant patellofemoral joint arthritis. At this point, attention was turned to the left wrist.  Aspiration of the left wrist did again show very little fluid. However, decision was made to make a small incision in order to irrigate this as she was having significant wrist pain. The synovium again was very inflamed. Irrigation was done. All three wounds were closed with 3-0 nylon. Once this was done, the patient was covered with Adaptic, 4 x 4, sterile cast padding, awoken, extubated, and transferred to the recovery room without complication. POSTOPERATIVE PLAN:  She will be admitted back to the hospitalist service. Cultures will be followed and she will be treated on appropriate IV antibiotics.         Angy Portillo MD      [id number]  D:  09/03/2019 16:45  T:  09/03/2019 23:48  JOB #:  [id number]

## 2019-09-04 NOTE — PROGRESS NOTES
Pt lost IV access around 2145 on 9/3/19    Nurse attempted to place IV twice. Called CCU for assistance, CCU not available. Called ICU, ICU sent nurse down who attempted to find a vein with no luck. ICU nurse suggested calling the anesthesiologist for assistance. A call was made to them and was then diverted to the Critical Care Transport for assistance. Nurse called them and they were not on site at [facility] and were not sure when they would be back. They said to call anestesia back, Call was attempted but no answer. Nurse called ED for assistance. No one could come help. Called Hospitalist NP Star Davis who attempted twice and was semi successful with the second attempt. NP said to let IV fluids run for a little while before starting antibiotics. IV running was painful and pt could not tollerate normal sailine running. Called NP about what to do. NP said IV is no good do not use it/ remove it. And PICC orders were put in for the AM.     NP ordered one time dose of break through OXY 5 mg because pt can no longer receive toradol due to lack of IV access. Pain not managed well according to pt. Will continue to monitor.

## 2019-09-04 NOTE — PROGRESS NOTES
BREANA-Pt will likely stay in the hospital while she is receiving IV antibx. Pending ID consult. Pt discussed in rounds. This pt has a PICC line and will likely need IV antibx. ID has been consulted. Due to her hx of IV drug use, she will not be able to be discharged with a PICC line. This pt will need to stay hospitalized while she is receiving IV antibiotics.  Sahil Santoyo

## 2019-09-04 NOTE — PROCEDURES
PICC Placement Note    PRE-PROCEDURE VERIFICATION  Correct Procedure: yes  Correct Site:  yes  Temperature: Temp: 98.3 °F (36.8 °C), Temperature Source: Temp Source: Oral  Recent Labs     09/03/19  0827   BUN 11   CREA 0.84      WBC 18.0*     Allergies: Patient has no known allergies. Education materials, including PICC Booklet, for PICC Care given to patient: yes. See Patient Education activity for further details. PROCEDURE DETAIL  A double lumen PICC line was started for vascular access. The following documentation is in addition to the PICC properties in the lines/airways flowsheet :  Lot #: [id number]  Was xylocaine 1% used intradermally:  yes  Catheter Length: 42 (cm)  Vein Selection for PICC:right brachial  Central Line Bundle followed yes  Complication Related to Insertion: unable to access basilic vein. The placement was verified by ECG/Sapiens technology: The  tip location is on the left side and the tip is in the  superior vena cava. See ECG results for PICC tip placement. Report given to nurse Cinda Rios. Line is okay to use.     Raghav Bowling RN

## 2019-09-04 NOTE — PROGRESS NOTES
Hospitalist Progress Note  Ella Munson MD  Answering service: [phone] from in house phone        Date of Service:  9/3/2019  NAME:  Arthur Romero  :  1970  MRN:  [id number]      Admission Summary:   60-year-old woman with a past medical history significant for hepatitis C infection, intravenous drug abuse, was in her usual state of health until the day of her presentation at the emergency room when the patient developed generalized body aches and pain. The patient is complaining of pain, specifically in the right shoulder. She also had some left knee pain. Ortho was consulted,Shoulder aspiration could not be performed in the ER. Was started on empiric abx and was admitted for further eval.    Interval history / Subjective:       Patient states that she relapsed and started using IV heroin few months ago after death of her boyfriend. She did not take methadone for 2-3 months now. States that she is uncomfortable as she has joint pains. S/p knee aspiration today -yellowish cloudy fluid/pus aspirated. Assessment & Plan:     #Septic arthritis at multiple joints:  -s/p knee aspiration which so far on gram stain showed gram negative rods  -b/c negative so far  -continue vanc and cefepime  -white count remains high.  -s/p arthroscopic I and D of left knee,debridement and irrigation of rt shoulder and open I and D left wrist  -ortho following  -unclear if intra OP cultures sent or not  -ID consulted. --can use prn toradol, roxicodone for pain management. Schedule tylenol. #Substanace abuse  -UDS positive for cocaine and opiates. Patient reports injecting heroin every day prior to admission  -counselled to quit.  -Patient consented for HIV testing  -d/c methadone as patient not taking it for few months now.  -assess for withdrawal symptoms    #Chronic hep C:  -Qt PCR in 3/2019-5,670,000   -OP follow up.    Hyponatrmia:improving    Chronic tobacco use: nicotine patch.  -counseled to quit  Code status: full  DVT prophylaxis: SCD    Care Plan discussed with: patient,nurse  Disposition: TBD     Hospital Problems  Date Reviewed: 9/2/2019          Codes Class Noted POA    * (Principal) Pyogenic arthritis of right shoulder region [facility]) ICD-10-CM: M00.9  ICD-9-CM: 711.01  9/2/2019 Yes        Septic arthritis ([address] ICD-10-CM: M00.9  ICD-9-CM: 711.00  9/2/2019 Unknown                Review of Systems:   As per HPI       Vital Signs:    Last 24hrs VS reviewed since prior progress note. Most recent are:  Visit Vitals  /89 (BP 1 Location: Left arm, BP Patient Position: At rest)   Pulse 76   Temp 98.3 °F (36.8 °C)   Resp 15   Ht 5' 4\" (1.626 m)   Wt 58 kg (127 lb 13.9 oz)   SpO2 100%   BMI 21.95 kg/m²         Intake/Output Summary (Last 24 hours) at 9/3/2019 2307  Last data filed at 9/3/2019 1700  Gross per 24 hour   Intake 750 ml   Output 403 ml   Net 347 ml        Physical Examination:             Constitutional:  middle aged female   ENT:  Oral mucous dry, oropharynx benign. Neck supple,    Resp:  CTA bilaterally. No wheezing/rhonchi/rales. No accessory muscle use   CV:  Regular rhythm, normal rate, no murmurs    GI:  Soft, non distended, non tender. normoactive bowel sounds    Musculoskeletal:  left wrist is erythematous,left knee is warm to touch,pain ful with ROM. Rt shoulder tender to palpate,ROM limited to pain  Skin: needle marks on both LE    Neurologic:  Moves all extremities.   AAOx3            Data Review:    Review and/or order of clinical lab test  Review and/or order of tests in the radiology section of CPT  Review and/or order of tests in the medicine section of CPT      Labs:     Recent Labs     09/03/19 0827 09/02/19 1947   WBC 18.0* 18.0*   HGB 12.8 13.7   HCT 38.7 41.4    190     Recent Labs     09/03/19 0827 09/02/19 1947   * 130*   K 3.8 3.4*    98   CO2 20* 24   BUN 11 12 CREA 0.84 0.79   * 90   CA 8.8 9.3     Recent Labs     09/03/19 0827 09/02/19 1947   SGOT 37 42*   ALT 41 47    121*   TBILI 0.6 0.8   TP 7.4 8.4*   ALB 2.9* 3.5   GLOB 4.5* 4.9*     No results for input(s): INR, PTP, APTT in the last 72 hours. No lab exists for component: INREXT   No results for input(s): FE, TIBC, PSAT, FERR in the last 72 hours. No results found for: FOL, RBCF   No results for input(s): PH, PCO2, PO2 in the last 72 hours.   Recent Labs     09/02/19 1947   CPK 63     Lab Results   Component Value Date/Time    Cholesterol, total 144 09/03/2019 08:27 AM    HDL Cholesterol 29 09/03/2019 08:27 AM    LDL, calculated 84.2 09/03/2019 08:27 AM    Triglyceride 154 (H) 09/03/2019 08:27 AM    CHOL/HDL Ratio 5.0 09/03/2019 08:27 AM     Lab Results   Component Value Date/Time    Glucose (POC) 130 (H) 02/24/2019 09:31 AM     No results found for: COLOR, APPRN, SPGRU, REFSG, SUE, PROTU, GLUCU, KETU, BILU, UROU, YENIFER, LEUKU, GLUKE, EPSU, BACTU, WBCU, RBCU, CASTS, UCRY      Medications Reviewed:     Current Facility-Administered Medications   Medication Dose Route Frequency    vancomycin (VANCOCIN) 1,000 mg in 0.9% sodium chloride (MBP/ADV) 250 mL  1,000 mg IntraVENous Q12H    LORazepam (ATIVAN) tablet 1 mg  1 mg Oral Q6H PRN    oxyCODONE IR (ROXICODONE) tablet 5 mg  5 mg Oral Q6H PRN    sodium chloride (NS) flush 5-40 mL  5-40 mL IntraVENous Q8H    sodium chloride (NS) flush 5-40 mL  5-40 mL IntraVENous PRN    ketorolac (TORADOL) injection 15 mg  15 mg IntraVENous Q6H PRN    acetaminophen (TYLENOL) tablet 650 mg  650 mg Oral Q4H PRN    ondansetron (ZOFRAN) injection 4 mg  4 mg IntraVENous Q4H PRN    bisacodyl (DULCOLAX) tablet 5 mg  5 mg Oral DAILY PRN    heparin (porcine) injection 5,000 Units  5,000 Units SubCUTAneous Q8H    cefepime (MAXIPIME) 2 g in 0.9% sodium chloride (MBP/ADV) 100 mL  2 g IntraVENous Q12H    nicotine (NICODERM CQ) 21 mg/24 hr patch 1 Patch  1 Patch TransDERmal DAILY    0.9% sodium chloride infusion  125 mL/hr IntraVENous CONTINUOUS    Vancomycin- Pharmacy to Dose   Other Rx Dosing/Monitoring     ______________________________________________________________________  EXPECTED LENGTH OF STAY: 3d 0h  ACTUAL LENGTH OF STAY:          1                 Lili Mcfadden MD

## 2019-09-05 VITALS
HEIGHT: 64 IN | DIASTOLIC BLOOD PRESSURE: 85 MMHG | RESPIRATION RATE: 26 BRPM | OXYGEN SATURATION: 94 % | SYSTOLIC BLOOD PRESSURE: 158 MMHG | HEART RATE: 59 BPM | WEIGHT: 141.76 LBS | TEMPERATURE: 98.7 F | BODY MASS INDEX: 24.2 KG/M2

## 2019-09-05 LAB
ANION GAP SERPL CALC-SCNC: 9 MMOL/L (ref 5–15)
ATRIAL RATE: 61 BPM
BASOPHILS # BLD: 0 K/UL (ref 0–0.1)
BASOPHILS NFR BLD: 0 % (ref 0–1)
BUN SERPL-MCNC: 12 MG/DL (ref 6–20)
BUN/CREAT SERPL: 16 (ref 12–20)
CALCIUM SERPL-MCNC: 8.7 MG/DL (ref 8.5–10.1)
CALCULATED P AXIS, ECG09: 35 DEGREES
CALCULATED R AXIS, ECG10: 37 DEGREES
CALCULATED T AXIS, ECG11: 15 DEGREES
CHLORIDE SERPL-SCNC: 110 MMOL/L (ref 97–108)
CO2 SERPL-SCNC: 24 MMOL/L (ref 21–32)
CREAT SERPL-MCNC: 0.76 MG/DL (ref 0.55–1.02)
DIAGNOSIS, 93000: NORMAL
DIFFERENTIAL METHOD BLD: ABNORMAL
EOSINOPHIL # BLD: 0 K/UL (ref 0–0.4)
EOSINOPHIL NFR BLD: 0 % (ref 0–7)
ERYTHROCYTE [DISTWIDTH] IN BLOOD BY AUTOMATED COUNT: 14.2 % (ref 11.5–14.5)
GLUCOSE SERPL-MCNC: 79 MG/DL (ref 65–100)
HCT VFR BLD AUTO: 33.3 % (ref 35–47)
HGB BLD-MCNC: 11 G/DL (ref 11.5–16)
IMM GRANULOCYTES # BLD AUTO: 0.1 K/UL (ref 0–0.04)
IMM GRANULOCYTES NFR BLD AUTO: 1 % (ref 0–0.5)
LYMPHOCYTES # BLD: 2 K/UL (ref 0.8–3.5)
LYMPHOCYTES NFR BLD: 20 % (ref 12–49)
MCH RBC QN AUTO: 32.9 PG (ref 26–34)
MCHC RBC AUTO-ENTMCNC: 33 G/DL (ref 30–36.5)
MCV RBC AUTO: 99.7 FL (ref 80–99)
MONOCYTES # BLD: 0.9 K/UL (ref 0–1)
MONOCYTES NFR BLD: 9 % (ref 5–13)
NEUTS SEG # BLD: 7 K/UL (ref 1.8–8)
NEUTS SEG NFR BLD: 70 % (ref 32–75)
NRBC # BLD: 0 K/UL (ref 0–0.01)
NRBC BLD-RTO: 0 PER 100 WBC
P-R INTERVAL, ECG05: 156 MS
PLATELET # BLD AUTO: 197 K/UL (ref 150–400)
PMV BLD AUTO: 11.1 FL (ref 8.9–12.9)
POTASSIUM SERPL-SCNC: 3.6 MMOL/L (ref 3.5–5.1)
Q-T INTERVAL, ECG07: 390 MS
QRS DURATION, ECG06: 78 MS
QTC CALCULATION (BEZET), ECG08: 392 MS
RBC # BLD AUTO: 3.34 M/UL (ref 3.8–5.2)
SODIUM SERPL-SCNC: 143 MMOL/L (ref 136–145)
VENTRICULAR RATE, ECG03: 61 BPM
WBC # BLD AUTO: 10.1 K/UL (ref 3.6–11)

## 2019-09-05 PROCEDURE — 74011250636 HC RX REV CODE- 250/636: Performed by: HOSPITALIST

## 2019-09-05 PROCEDURE — 74011250637 HC RX REV CODE- 250/637: Performed by: HOSPITALIST

## 2019-09-05 PROCEDURE — 93005 ELECTROCARDIOGRAM TRACING: CPT

## 2019-09-05 PROCEDURE — 74011250636 HC RX REV CODE- 250/636: Performed by: ORTHOPAEDIC SURGERY

## 2019-09-05 PROCEDURE — 74011000258 HC RX REV CODE- 258: Performed by: ORTHOPAEDIC SURGERY

## 2019-09-05 PROCEDURE — 36415 COLL VENOUS BLD VENIPUNCTURE: CPT

## 2019-09-05 PROCEDURE — 80048 BASIC METABOLIC PNL TOTAL CA: CPT

## 2019-09-05 PROCEDURE — 74011250637 HC RX REV CODE- 250/637: Performed by: ORTHOPAEDIC SURGERY

## 2019-09-05 PROCEDURE — 85025 COMPLETE CBC W/AUTO DIFF WBC: CPT

## 2019-09-05 RX ORDER — LORAZEPAM 2 MG/ML
0.5 INJECTION INTRAMUSCULAR
Status: DISCONTINUED | OUTPATIENT
Start: 2019-09-05 | End: 2019-09-05

## 2019-09-05 RX ORDER — HEPARIN SODIUM 5000 [USP'U]/ML
5000 INJECTION, SOLUTION INTRAVENOUS; SUBCUTANEOUS EVERY 12 HOURS
Status: DISCONTINUED | OUTPATIENT
Start: 2019-09-05 | End: 2019-09-05 | Stop reason: HOSPADM

## 2019-09-05 RX ORDER — HYDROMORPHONE HYDROCHLORIDE 2 MG/ML
2 INJECTION, SOLUTION INTRAMUSCULAR; INTRAVENOUS; SUBCUTANEOUS
Status: DISCONTINUED | OUTPATIENT
Start: 2019-09-05 | End: 2019-09-05 | Stop reason: HOSPADM

## 2019-09-05 RX ORDER — LORAZEPAM 1 MG/1
2 TABLET ORAL
Status: DISCONTINUED | OUTPATIENT
Start: 2019-09-05 | End: 2019-09-05

## 2019-09-05 RX ORDER — LORAZEPAM 2 MG/ML
1 INJECTION INTRAMUSCULAR
Status: DISCONTINUED | OUTPATIENT
Start: 2019-09-05 | End: 2019-09-05 | Stop reason: HOSPADM

## 2019-09-05 RX ORDER — BACITRACIN 500 UNIT/G
PACKET (EA) TOPICAL
Status: DISCONTINUED
Start: 2019-09-05 | End: 2019-09-05 | Stop reason: HOSPADM

## 2019-09-05 RX ORDER — CLONIDINE HYDROCHLORIDE 0.1 MG/1
0.1 TABLET ORAL 2 TIMES DAILY
Status: DISCONTINUED | OUTPATIENT
Start: 2019-09-05 | End: 2019-09-05

## 2019-09-05 RX ORDER — HYDROMORPHONE HYDROCHLORIDE 1 MG/ML
1 INJECTION, SOLUTION INTRAMUSCULAR; INTRAVENOUS; SUBCUTANEOUS
Status: DISCONTINUED | OUTPATIENT
Start: 2019-09-05 | End: 2019-09-05

## 2019-09-05 RX ADMIN — ONDANSETRON 4 MG: 2 INJECTION INTRAMUSCULAR; INTRAVENOUS at 06:59

## 2019-09-05 RX ADMIN — CEFEPIME HYDROCHLORIDE 2 G: 2 INJECTION, POWDER, FOR SOLUTION INTRAVENOUS at 12:17

## 2019-09-05 RX ADMIN — CLONIDINE HYDROCHLORIDE 0.1 MG: 0.1 TABLET ORAL at 10:22

## 2019-09-05 RX ADMIN — VANCOMYCIN HYDROCHLORIDE 1000 MG: 1 INJECTION, POWDER, LYOPHILIZED, FOR SOLUTION INTRAVENOUS at 00:12

## 2019-09-05 RX ADMIN — ACETAMINOPHEN 650 MG: 325 TABLET, FILM COATED ORAL at 10:22

## 2019-09-05 RX ADMIN — HYDROMORPHONE HYDROCHLORIDE 1 MG: 1 INJECTION, SOLUTION INTRAMUSCULAR; INTRAVENOUS; SUBCUTANEOUS at 15:27

## 2019-09-05 RX ADMIN — OXYCODONE HYDROCHLORIDE 5 MG: 5 TABLET ORAL at 04:43

## 2019-09-05 RX ADMIN — VANCOMYCIN HYDROCHLORIDE 1000 MG: 1 INJECTION, POWDER, LYOPHILIZED, FOR SOLUTION INTRAVENOUS at 13:35

## 2019-09-05 RX ADMIN — Medication 10 ML: at 13:36

## 2019-09-05 RX ADMIN — HYDROMORPHONE HYDROCHLORIDE 1 MG: 1 INJECTION, SOLUTION INTRAMUSCULAR; INTRAVENOUS; SUBCUTANEOUS at 17:07

## 2019-09-05 RX ADMIN — SODIUM CHLORIDE 50 ML/HR: 900 INJECTION, SOLUTION INTRAVENOUS at 12:18

## 2019-09-05 RX ADMIN — LORAZEPAM 2 MG: 1 TABLET ORAL at 10:22

## 2019-09-05 RX ADMIN — LORAZEPAM 1 MG: 1 TABLET ORAL at 06:59

## 2019-09-05 RX ADMIN — OXYCODONE HYDROCHLORIDE 5 MG: 5 TABLET ORAL at 10:22

## 2019-09-05 RX ADMIN — Medication 10 ML: at 07:02

## 2019-09-05 NOTE — PROGRESS NOTES
Patient trying multiple times throughout day to leave AMA. Working with Dr. Galen Wynn with medications to help withdrawal.       [id number]: Patient walked out of room, down the pagan, and toward the elevators, refusing to return to room. Code chandan called. Security spoke with patient and brought her back to room. Dr Galen Wynn at bedside, speaking with patient about risks. Patient alert and oriented. Understands risks and still wants to leave AMA. Signed AMA paperwork. Patient states she is staying with friend who agrees to take her home and stay with her. Patient's PICC line removed. Patient stayed flat for 30 minutes. Patient escorted with security and tech out of hospital.       Problem: Falls - Risk of  Goal: *Absence of Falls  Description  Document Liban Hogue Fall Risk and appropriate interventions in the flowsheet. Outcome: Progressing Towards Goal  Note:   Fall Risk Interventions:  Mobility Interventions: Communicate number of staff needed for ambulation/transfer, Patient to call before getting OOB, Strengthening exercises (ROM-active/passive)         Medication Interventions: Patient to call before getting OOB, Teach patient to arise slowly    Elimination Interventions: Call light in reach, Patient to call for help with toileting needs, Toileting schedule/hourly rounds              Problem: Risk for Spread of Infection  Goal: Prevent transmission of infectious organism to others  Description  Prevent the transmission of infectious organisms to other patients, staff members, and visitors.   Outcome: Progressing Towards Goal

## 2019-09-05 NOTE — PROGRESS NOTES
Hospitalist Progress Note  Jarett Salter MD  Answering service: [phone] from in house phone        Date of Service:  2019  NAME:  Chetna Arteaga  :  1970  MRN:  [id number]      Admission Summary:   16-year-old woman with a past medical history significant for hepatitis C infection, intravenous drug abuse, was in her usual state of health until the day of her presentation at the emergency room when the patient developed generalized body aches and pain. The patient is complaining of pain, specifically in the right shoulder. She also had some left knee pain. Ortho was consulted,Shoulder aspiration could not be performed in the ER. Was started on empiric abx and was admitted for further eval.    Interval history / Subjective:       9/3-Patient states that she relapsed and started using IV heroin few months ago after death of her boyfriend. She did not take methadone for 2-3 months now. States that she is uncomfortable as she has joint pains. S/p knee aspiration  -yellowish cloudy fluid/pus aspirated.  - noted overnight events,lost IV access. This morning,patient was asking if methadone could be restarted - discussed with the patient as she did not go the methadone clinic for few months and at discharge we cannot prescribe methadone without a plan further. She said she has an appointment next week with her methadone clinic again - I told her she will need to be in the hospital for IV abx course and the duration yet to determine. She states that everything hurts and feels uncomfortable. Complained of pain at rt shoulder,left wrist and left knee. Assessment & Plan:     #Synovitis of rt shoulder,left wrist and suspected septic arthritis of left knee  -s/p knee aspiration which so far on gram stain showed gram negative rods,cultures pending  -b/c negative so far  -continue vanc and cefepime.  PICC line placed on 9/4 due to poor IV access.  -white count slightly decreased   -s/p arthroscopic I and D of left knee,debridement and irrigation of rt shoulder and open I and D left wrist.  -ortho following  -ID consulted.  -Scheduled tylenol, prn  toradol and 5 mg roxicodone Q 6 hr prn for pain management. #Substanace abuse:  -UDS positive for cocaine and opiates. Patient reports injecting heroin every day prior to admission.  -counselled to quit. HIV negative  - Called [facility] from where patient receiving methadone few  Months ago - staff said Benji Balbuena will be back tomorrow. - She received 30 mg methadone yesterday - she is exhibiting some withdrawal symptoms -restlessness,arthralgias,nausea - will give 20 mg methadone today and wean off in the next few days. -will try to discuss with patient's addiction medicine specialist.  -prn ativan. #Chronic hep C:  -Qt PCR in 3/2019-5,670,000   -OP follow up. Hyponatrmia:improved    Chronic tobacco use: nicotine patch.  -counseled to quit  Code status: full  DVT prophylaxis: SCD    Care Plan discussed with: patient,nurse  Disposition: TBD     Hospital Problems  Date Reviewed: 9/2/2019          Codes Class Noted POA    * (Principal) Pyogenic arthritis of right shoulder region [facility]) ICD-10-CM: M00.9  ICD-9-CM: 711.01  9/2/2019 Yes        Septic arthritis ([facility] [address] ICD-10-CM: M00.9  ICD-9-CM: 711.00  9/2/2019 Unknown                Review of Systems:   As per HPI       Vital Signs:    Last 24hrs VS reviewed since prior progress note. Most recent are:  Visit Vitals  /85 (BP 1 Location: Right arm, BP Patient Position: At rest)   Pulse 60   Temp 98 °F (36.7 °C)   Resp 16   Ht 5' 4\" (1.626 m)   Wt 58 kg (127 lb 13.9 oz)   SpO2 95%   BMI 21.95 kg/m²       No intake or output data in the 24 hours ending 09/04/19 1364     Physical Examination:             Constitutional:  middle aged female   ENT:  Oral mucous dry, oropharynx benign. Neck supple,    Resp:  CTA bilaterally.  No wheezing/rhonchi/rales. No accessory muscle use   CV:  Regular rhythm, normal rate, no murmurs    GI:  Soft, non distended, non tender. normoactive bowel sounds    Musculoskeletal:  left wrist,rt shoulder and left knee dry dressing +  Skin: needle marks on both LE    Neurologic:  Moves all extremities. AAOx3            Data Review:    Review and/or order of clinical lab test  Review and/or order of tests in the medicine section of [facility]      Labs:     Recent Labs     09/04/19 1104 09/03/19 0827   WBC 15.0* 18.0*   HGB 11.1* 12.8   HCT 33.7* 38.7    201     Recent Labs     09/04/19 1104 09/03/19 0827 09/02/19 1947    133* 130*   K 3.6 3.8 3.4*    105 98   CO2 23 20* 24   BUN 12 11 12   CREA 0.80 0.84 0.79   * 143* 90   CA 8.5 8.8 9.3     Recent Labs     09/03/19 0827 09/02/19 1947   SGOT 37 42*   ALT 41 47    121*   TBILI 0.6 0.8   TP 7.4 8.4*   ALB 2.9* 3.5   GLOB 4.5* 4.9*     Recent Labs     09/04/19  1137   APTT 31.9      No results for input(s): FE, TIBC, PSAT, FERR in the last 72 hours. No results found for: FOL, RBCF   No results for input(s): PH, PCO2, PO2 in the last 72 hours.   Recent Labs     09/02/19 1947   CPK 63     Lab Results   Component Value Date/Time    Cholesterol, total 144 09/03/2019 08:27 AM    HDL Cholesterol 29 09/03/2019 08:27 AM    LDL, calculated 84.2 09/03/2019 08:27 AM    Triglyceride 154 (H) 09/03/2019 08:27 AM    CHOL/HDL Ratio 5.0 09/03/2019 08:27 AM     Lab Results   Component Value Date/Time    Glucose (POC) 130 (H) 02/24/2019 09:31 AM     No results found for: COLOR, APPRN, SPGRU, REFSG, SUE, PROTU, GLUCU, KETU, BILU, UROU, YENIFER, LEUKU, GLUKE, EPSU, BACTU, WBCU, RBCU, CASTS, UCRY      Medications Reviewed:     Current Facility-Administered Medications   Medication Dose Route Frequency    vancomycin (VANCOCIN) 1,000 mg in 0.9% sodium chloride (MBP/ADV) 250 mL  1,000 mg IntraVENous Q12H    LORazepam (ATIVAN) tablet 1 mg  1 mg Oral Q6H PRN    oxyCODONE IR (ROXICODONE) tablet 5 mg  5 mg Oral Q6H PRN    ketorolac (TORADOL) injection 15 mg  15 mg IntraVENous Q8H PRN    acetaminophen (TYLENOL) tablet 650 mg  650 mg Oral TID    sodium chloride (NS) flush 5-40 mL  5-40 mL IntraVENous Q8H    sodium chloride (NS) flush 5-40 mL  5-40 mL IntraVENous PRN    ondansetron (ZOFRAN) injection 4 mg  4 mg IntraVENous Q4H PRN    bisacodyl (DULCOLAX) tablet 5 mg  5 mg Oral DAILY PRN    cefepime (MAXIPIME) 2 g in 0.9% sodium chloride (MBP/ADV) 100 mL  2 g IntraVENous Q12H    nicotine (NICODERM CQ) 21 mg/24 hr patch 1 Patch  1 Patch TransDERmal DAILY    0.9% sodium chloride infusion  125 mL/hr IntraVENous CONTINUOUS    Vancomycin- Pharmacy to Dose   Other Rx Dosing/Monitoring     ______________________________________________________________________  EXPECTED LENGTH OF STAY: 3d 0h  ACTUAL LENGTH OF STAY:          2                 Pastor Soledad MD

## 2019-09-05 NOTE — PROGRESS NOTES
Infectious Diseases Consultation     Please see dictated note     Impression      1. Septic arthritis of left knee and possibly right shoulder and left wrist -- antibiotics started before left knee synovial culture obtained    2. Hx of chronic Hep C -- gentype 1a    3. IDU     Recommend:      1. Continue Vanc + cefepime pending cultures -- will likely need 4 weeks of IV Rx in hospital    2. If cultures are NG, would ask GYN to see for sample from cervix, pharynx, and rectum for GC to help evaluate for disseminated GC as cause of oligoarticular septic arthritis    3. Urine for GC    4. Needs Hep A and Hep B vaccine series    5.  Still needs Rx for Hep C      Thanks,   Maryjane rCuz MD  9/4/2019  10:43 PM

## 2019-09-05 NOTE — CONSULTS
[address]    Name:  Michael Watson  MR#:  [id number]  :  1970  ACCOUNT #:  [de-identified]  DATE OF SERVICE:  2019      REQUESTING PHYSICIAN:  Consult was requested by Dr. Fco Lakhani. ATTENDING PHYSICIAN:  Hair Mobley MD    CHIEF COMPLAINT:  Left knee pain, left wrist pain, and right shoulder pain. REASON FOR CONSULTATION:  Presumed septic arthritis of the left knee, right shoulder, and left wrist.    The consultation was requested by Dr. Fco Lakhani. The history is obtained by interview of the patient and review of the medical records. HISTORY OF PRESENT ILLNESS:  This patient is a 20-year-old white female with a history of chronic hepatitis C infection - genotype 1a and a history of intravenous drug use. This patient was recently in [facility] in 2019 with MRSA septic tenosynovitis of the right middle finger. She required surgical I and D. She then received about 2 weeks of IV vancomycin and was discharged on oral Bactrim. She was scheduled to return for followup, but did not followup in the office. The patient reports that her finger healed well. The patient entered a methadone program and states that she was on methadone 65 mg p.o. daily. However, she stopped taking methadone about 3 months ago and returned using IV heroin. She has continued to use IV heroin frequently including on the day of admission to the hospital.  The patient states that she developed left knee pain, right shoulder pain, and left wrist pain perhaps a day or so prior to admission. She presented to the emergency room. She underwent an attempted right shoulder arthrocentesis, but no fluid was obtained. The patient was then started empirically on broad-spectrum antibiotics with cefepime with the first dose at 10:00 p.m. on 2019 and then vancomycin was added at midnight.   The following day, on 2019, she underwent a left knee arthrocentesis yielding 50 mL of \"grossly cloudy fluid. \"  Synovial fluid analysis revealed 57,334 wbc's with 90% neutrophils and a negative crystal exam.  The Gram stain of the fluid revealed 2+ white blood cells and occasional gram-negative rods. Blood cultures that were drawn before antibiotics were started on 2019 are no growth so far and culture of the synovial fluid obtained after the dose of vancomycin and cefepime is no growth so far. On 2019, the patient underwent arthroscopic lavage of the right shoulder and the left knee as well as left wrist open I and D. We are now asked to see her for further evaluation. At the present time, the patient complains of pain in the above joints. She denies other areas of pain. She specifically denies neck and back pain. PAST MEDICAL HISTORY:  Tobacco:  Half a pack per day. Alcohol:  Occasional social use. MEDICATIONS PRIOR TO ADMISSION:  None. ALLERGIES:  NONE. ILLNESSES:  Chronic hepatitis C infection - genotype 1a - diagnosed perhaps about 2 years ago. The patient recalls being given about a week of treatment in [facility], but she still had a positive hepatitis C PCR earlier this year. SURGERIES:  1. Right breast lumpectomy - benign. 2.  . 3.  I and D of septic tenosynovitis of the right middle finger in 2019 with cultures revealing MRSA. 4.  Arthroscopic I and D of the right shoulder and left knee and open I and D of the left wrist on 2019 as in the HPI. SOCIAL HISTORY:  She apparently has four children (twins as well as two other children). She has worked as a . However, she has been using IV heroin daily. FAMILY HISTORY:  Her father apparently has diabetes. Her mother has COPD. REVIEW OF SYSTEMS:  CONSTITUTIONAL:  She does not recall having fever or chills. HEENT:  No headache or visual change and no sore throat. LYMPHATIC:  No history of adenopathy. DERMATOLOGIC:  No history of recent rash.   RESPIRATORY:  No cough or hemoptysis. CARDIOVASCULAR:  No chest pain and no history of heart murmurs. GI:  No nausea, vomiting, abdominal pain, or diarrhea. :  No dysuria. MUSCULOSKELETAL:  As in the HPI. NEUROLOGIC:  No history of seizure or stroke. GYN:  Negative. PSYCHIATRIC:  Negative. PHYSICAL EXAMINATION:  GENERAL:  Lethargic, but easily arousable. VITAL SIGNS:  Blood pressure is 138/85, heart rate 60, respiratory rate 16. Her highest temperature was 100.2 degrees Fahrenheit. Weight 127 pounds. HEENT:  Sclerae and conjunctivae benign, EOMI, PERRL. Oropharynx benign. NECK:  Supple. NODES:  No adenopathy. SKIN:  No rashes. LUNGS:  Clear. CARDIOVASCULAR:  Regular rate and rhythm without murmurs. ABDOMEN:  Soft, nondistended, nontender. No masses or HSM. Bowel sounds present. BACK:  No CVA tenderness. No spinal percussion tenderness. PELVIC:  Exam not done. EXTREMITIES:  No cellulitis. MUSCULOSKELETAL:  Muscles nontender. There is a dressing over the left knee and left wrist and right shoulder. No other joints appear to be tender or have detectable effusions. NEUROLOGIC:  She is lethargic, but arousable. She is oriented. No focal motor deficits. LABORATORY DATA:  CBC on presentation revealed a hemoglobin of 13.7, white count 18,000 with 83% neutrophils and platelets 624,724. Sedimentation rate was 51. Today, the white count is 15,000. The chemistry data revealed BUN 12, creatinine 0.9 on admission. Liver function tests were normal except for minimal elevation of SGOT up to 42 and alkaline phosphatase up to 121. C-reactive protein was 16.90 mg/dL (normal 0.00-0.60 mg/dL). MICROBIOLOGY DATA:  Blood cultures drawn at about 1947 hours on 09/02/2019 (before the first dose of antibiotics) showed no growth so far. Culture of the left knee synovial fluid on 09/03/2019 after a dose of vancomycin and cefepime is no growth so far.   The Gram stain had 2+ white blood cells and occasional gram-negative rods. IMPRESSION:  1. Presumed oligoarticular septic arthritis involving the left knee and possibly the right shoulder and left wrist - antibiotics were started before the left knee synovial fluid culture was obtained: The fluid from the left knee was consistent with septic arthritis. We do not have any cultures from the right shoulder or the left wrist.  Apparently, there was no significant fluid in either joint, but there was significant synovitis. The Gram stain of the left knee showed occasional gram-negative rods. If the culture is negative, we should also consider the possibility of disseminated gonococcal infection with oligoarticular arthritis. I will go ahead and get a urine for GC. Once again, if the culture of the left knee fluid is negative, then we should also consider sampling the cervix, oropharynx, and rectum for GC as well. 2.  History of chronic hepatitis C infection - genotype 1a: The patient should be treated and also has a recent history of negative hepatitis A total antibody and negative hepatitis B surface antibody. She should receive the vaccine series. 3.  History of injection drug use. RECOMMENDATIONS:  1. Continue vancomycin and cefepime pending cultures. She will likely need 4 weeks of IV antibiotics in the hospital.  2.  If cultures are no growth, I would ask GYN to see the patient to sample the cervix, oropharynx, and rectum for GC to help exclude disseminated GC as the cause of the oligoarticular septic arthritis. 3.  Urine for GC.  4.  I would advise the hepatitis A and hepatitis B vaccine series. 5.  The patient still needs treatment for chronic hepatitis C infection. Thank you very much for allowing us to see this patient with you.       Abbie Chaves MD      JOSE [id number]  D:  09/05/2019 0:34  T:  09/05/2019 0:42  JOB #:  [id number]  CC:  MD Carmine Wilson MD Neill Mitchell, MD

## 2019-09-05 NOTE — PROGRESS NOTES
TRANSFER - OUT REPORT:    Verbal report given to JAMES Johnson (name) on Maxx Alcazar  being transferred to [facility] (unit) for change in patient condition(COW scale 20; moderate withdrawal)       Report consisted of patients Situation, Background, Assessment and   Recommendations(SBAR). Information from the following report(s) SBAR, Kardex, Procedure Summary, Intake/Output, MAR, Accordion, Recent Results, Med Rec Status and Quality Measures was reviewed with the receiving nurse. Lines:   PICC Double Lumen 09/04/19 Left;Brachial (Active)   Central Line Being Utilized Yes 9/4/2019  7:25 PM   Criteria for Appropriate Use Long term IV/antibiotic administration 9/4/2019  7:25 PM   Site Assessment Clean, dry, & intact 9/4/2019  7:25 PM   Phlebitis Assessment 0 9/4/2019  7:25 PM   Infiltration Assessment 0 9/4/2019  7:25 PM   Date of Last Dressing Change 09/04/19 9/4/2019  7:25 PM   Dressing Status Clean, dry, & intact 9/4/2019  7:25 PM   Action Taken Open ports on tubing capped;Dressing reinforced;Dressing changed;Catheter repositioned 9/4/2019  7:25 PM   Dressing Type Disk with Chlorhexadine gluconate (CHG) 9/4/2019  7:25 PM   Hub Color/Line Status Purple 9/4/2019  7:25 PM   Positive Blood Return (Site #1) Yes 9/4/2019  7:25 PM   Hub Color/Line Status Red 9/4/2019  7:25 PM   Positive Blood Return (Site #2) Yes 9/4/2019  7:25 PM   Alcohol Cap Used Yes 9/4/2019  7:25 PM        Opportunity for questions and clarification was provided. Patient transported with:   Registered Nurse     Per supervisor Pola pt to be transported to [place] [address] without monitor d/t patient refusing to have RT on. Patient had removed RT leads at 1020 and refused to have leads placed back on.

## 2019-09-05 NOTE — PROGRESS NOTES
Code chandan called as patient walked down the hallway and went down -trying to leave the hospital. She escorted back to the room. I came to the room - patient said that she wants to the leave the hospital. I explained her again that she has multiple septic joints and needs Iv antibiotics - duration atleast 4 weeks was recommended by  for the treatment and needs to remain in the hospital.Patient repeatedly said she cannot stay that long and wants to leave the hospital.I explained her if we do not treat this infection it can destroy her joints,infection can worsen and even cause death. She states that she understands all the risks and still wants to leave the hospital against medical advise. PICC line removed prior to patient leaving AMA.

## 2019-09-05 NOTE — PROGRESS NOTES
Bedside and Verbal shift change report given to Selena Rees RN (oncoming nurse) by Saul Reddy RN (offgoing nurse). Report included the following information SBAR, Kardex, OR Summary, Procedure Summary, Intake/Output, MAR and Recent Results. LONG TERM CURRENT USE OF ANTICOAGULATION THERAPY    Pt on anticoagulation at home with daily warfarin. INR was 4.4 on admission. Has decreased PO intake for the past two months. INR 1.4 today. Will re-initiate warfarin.   - One dose this AM with additional dose in the afternoon  - Warfarin 5 mg every Sun, Mon, Wed, Fri  - Warfarin 7.5 mg every Tue, Thu, Sat  - Target INR of 2.5 (range 2.0 to 3.0)    -holding anticoagulation, as patient is on heparin. Will restart after [facility]

## 2019-09-05 NOTE — PROGRESS NOTES
ORTHO FRACTURE PROGRESS NOTE    2019  Admit Date:   2019    Post Op day: 2 Days Post-Op    Subjective:    Manjinder Fava states that her pain remains out of control worse in the shoulder and wrist; admits that knee is not very painful. Apparently fell last night but did not hit head and had no apparent injuries. Remains on Abx for likely polyarticular septic arthritis. ID following. Appears to be in withdrawal from heroin. States she wants to leave. Required Ativan, compazine and zofran overnight.      PT/OT:   Gait:                    Vital Signs:    Patient Vitals for the past 8 hrs:   BP Temp Pulse Resp SpO2 Weight   19 0926 146/83 99 °F (37.2 °C) 84 20 98 % --   19 0800 -- -- -- -- -- 64.3 kg (141 lb 12.1 oz)   19 0750 139/83 98.2 °F (36.8 °C) 62 16 98 % --   19 0332 135/80 98.3 °F (36.8 °C) 62 16 96 % --     Temp (24hrs), Av.3 °F (36.8 °C), Min:97.9 °F (36.6 °C), Max:99 °F (37.2 °C)      Pain Control:   Pain Assessment  Pain Scale 1: Numeric (0 - 10)  Pain Intensity 1: 5  Pain Onset 1: acute  Pain Location 1: Leg, Arm  Pain Orientation 1: Anterior  Pain Description 1: Aching  Pain Intervention(s) 1: Medication (see MAR)    Meds:    Current Facility-Administered Medications   Medication Dose Route Frequency    cloNIDine HCl (CATAPRES) tablet 0.1 mg  0.1 mg Oral BID    LORazepam (ATIVAN) tablet 2 mg  2 mg Oral Q4H PRN    vancomycin (VANCOCIN) 1,000 mg in 0.9% sodium chloride (MBP/ADV) 250 mL  1,000 mg IntraVENous Q12H    oxyCODONE IR (ROXICODONE) tablet 5 mg  5 mg Oral Q6H PRN    acetaminophen (TYLENOL) tablet 650 mg  650 mg Oral TID    sodium chloride (NS) flush 5-40 mL  5-40 mL IntraVENous Q8H    sodium chloride (NS) flush 5-40 mL  5-40 mL IntraVENous PRN    ondansetron (ZOFRAN) injection 4 mg  4 mg IntraVENous Q4H PRN    bisacodyl (DULCOLAX) tablet 5 mg  5 mg Oral DAILY PRN    cefepime (MAXIPIME) 2 g in 0.9% sodium chloride (MBP/ADV) 100 mL  2 g IntraVENous Q12H    nicotine (NICODERM CQ) 21 mg/24 hr patch 1 Patch  1 Patch TransDERmal DAILY    0.9% sodium chloride infusion  50 mL/hr IntraVENous CONTINUOUS    Vancomycin- Pharmacy to Dose   Other Rx Dosing/Monitoring       LAB:    Recent Labs     09/05/19  0451   HCT 33.3*   HGB 11.0*       Transfuse PRBC's:      Assessment & Physician's Comment:  Patient very uncomfortable and writhing  Right Shoulder dressing c/d/i with intact portal incisions  Left wrist with improving erythema and no active drainage noted - remains TTP  Left knee with no effusion; NTTP  Dressing is clean, dry, and intact  Neurovascular checks within normal limits  Orientation:  Oriented    Principal Problem:    Pyogenic arthritis of right shoulder region [facility]) (9/2/2019)    Active Problems:    Septic arthritis ([facility] [address] (9/2/2019)        Plan:    Cont Abx per ID - evaluating for disseminated GC  Heroin withdrawal - treatment and plan per Medicine  OK to WBAT through all limbs  Following  Dr Daya Walters aware of patient presentation and agrees with current plan    Sheryl Pugh PA-C   Orthopedic Trauma Service  [facility]

## 2019-09-06 ENCOUNTER — HOSPITAL ENCOUNTER (INPATIENT)
Age: 49
LOS: 1 days | Discharge: HOME | DRG: 550 | End: 2019-09-07
Attending: EMERGENCY MEDICINE | Admitting: HOSPITALIST
Payer: SELF-PAY

## 2019-09-06 ENCOUNTER — APPOINTMENT (OUTPATIENT)
Dept: GENERAL RADIOLOGY | Age: 49
DRG: 550 | End: 2019-09-06
Attending: EMERGENCY MEDICINE
Payer: SELF-PAY

## 2019-09-06 DIAGNOSIS — M00.89 ARTHRITIS OF MULTIPLE SITES DUE TO OTHER BACTERIA (HCC): Primary | ICD-10-CM

## 2019-09-06 PROBLEM — M00.9 SEPTIC JOINT OF LEFT KNEE JOINT (HCC): Status: ACTIVE | Noted: 2019-09-06

## 2019-09-06 LAB
ALBUMIN SERPL-MCNC: 3.3 G/DL (ref 3.5–5)
ALBUMIN/GLOB SERPL: 0.7 {RATIO} (ref 1.1–2.2)
ALP SERPL-CCNC: 99 U/L (ref 45–117)
ALT SERPL-CCNC: 31 U/L (ref 12–78)
AMPHET UR QL SCN: NEGATIVE
ANION GAP SERPL CALC-SCNC: 9 MMOL/L (ref 5–15)
AST SERPL-CCNC: 20 U/L (ref 15–37)
BARBITURATES UR QL SCN: NEGATIVE
BASOPHILS # BLD: 0 K/UL (ref 0–0.1)
BASOPHILS NFR BLD: 0 % (ref 0–1)
BENZODIAZ UR QL: NEGATIVE
BILIRUB SERPL-MCNC: 0.6 MG/DL (ref 0.2–1)
BUN SERPL-MCNC: 10 MG/DL (ref 6–20)
BUN/CREAT SERPL: 15 (ref 12–20)
CALCIUM SERPL-MCNC: 9.2 MG/DL (ref 8.5–10.1)
CANNABINOIDS UR QL SCN: NEGATIVE
CHLORIDE SERPL-SCNC: 102 MMOL/L (ref 97–108)
CO2 SERPL-SCNC: 26 MMOL/L (ref 21–32)
COCAINE UR QL SCN: POSITIVE
COMMENT, HOLDF: NORMAL
CREAT SERPL-MCNC: 0.68 MG/DL (ref 0.55–1.02)
DIFFERENTIAL METHOD BLD: ABNORMAL
DRUG SCRN COMMENT,DRGCM: ABNORMAL
EOSINOPHIL # BLD: 0.1 K/UL (ref 0–0.4)
EOSINOPHIL NFR BLD: 1 % (ref 0–7)
ERYTHROCYTE [DISTWIDTH] IN BLOOD BY AUTOMATED COUNT: 13.3 % (ref 11.5–14.5)
GLOBULIN SER CALC-MCNC: 4.5 G/DL (ref 2–4)
GLUCOSE SERPL-MCNC: 90 MG/DL (ref 65–100)
HCT VFR BLD AUTO: 38.6 % (ref 35–47)
HGB BLD-MCNC: 13 G/DL (ref 11.5–16)
IMM GRANULOCYTES # BLD AUTO: 0.1 K/UL (ref 0–0.04)
IMM GRANULOCYTES NFR BLD AUTO: 1 % (ref 0–0.5)
LACTATE SERPL-SCNC: 0.7 MMOL/L (ref 0.4–2)
LYMPHOCYTES # BLD: 1.8 K/UL (ref 0.8–3.5)
LYMPHOCYTES NFR BLD: 18 % (ref 12–49)
MCH RBC QN AUTO: 32.3 PG (ref 26–34)
MCHC RBC AUTO-ENTMCNC: 33.7 G/DL (ref 30–36.5)
MCV RBC AUTO: 95.8 FL (ref 80–99)
METHADONE UR QL: NEGATIVE
MONOCYTES # BLD: 1.2 K/UL (ref 0–1)
MONOCYTES NFR BLD: 12 % (ref 5–13)
NEUTS SEG # BLD: 6.8 K/UL (ref 1.8–8)
NEUTS SEG NFR BLD: 68 % (ref 32–75)
NRBC # BLD: 0 K/UL (ref 0–0.01)
NRBC BLD-RTO: 0 PER 100 WBC
OPIATES UR QL: POSITIVE
PCP UR QL: NEGATIVE
PLATELET # BLD AUTO: 278 K/UL (ref 150–400)
PMV BLD AUTO: 10.4 FL (ref 8.9–12.9)
POTASSIUM SERPL-SCNC: 3.2 MMOL/L (ref 3.5–5.1)
PROT SERPL-MCNC: 7.8 G/DL (ref 6.4–8.2)
RBC # BLD AUTO: 4.03 M/UL (ref 3.8–5.2)
SAMPLES BEING HELD,HOLD: NORMAL
SODIUM SERPL-SCNC: 137 MMOL/L (ref 136–145)
WBC # BLD AUTO: 10 K/UL (ref 3.6–11)

## 2019-09-06 PROCEDURE — 74011250636 HC RX REV CODE- 250/636: Performed by: EMERGENCY MEDICINE

## 2019-09-06 PROCEDURE — 87491 CHLMYD TRACH DNA AMP PROBE: CPT

## 2019-09-06 PROCEDURE — 99285 EMERGENCY DEPT VISIT HI MDM: CPT

## 2019-09-06 PROCEDURE — 83605 ASSAY OF LACTIC ACID: CPT

## 2019-09-06 PROCEDURE — 80307 DRUG TEST PRSMV CHEM ANLYZR: CPT

## 2019-09-06 PROCEDURE — 85025 COMPLETE CBC W/AUTO DIFF WBC: CPT

## 2019-09-06 PROCEDURE — 96361 HYDRATE IV INFUSION ADD-ON: CPT

## 2019-09-06 PROCEDURE — 74011250636 HC RX REV CODE- 250/636: Performed by: NURSE PRACTITIONER

## 2019-09-06 PROCEDURE — 74011250637 HC RX REV CODE- 250/637: Performed by: HOSPITALIST

## 2019-09-06 PROCEDURE — 93005 ELECTROCARDIOGRAM TRACING: CPT

## 2019-09-06 PROCEDURE — 71045 X-RAY EXAM CHEST 1 VIEW: CPT

## 2019-09-06 PROCEDURE — 96376 TX/PRO/DX INJ SAME DRUG ADON: CPT

## 2019-09-06 PROCEDURE — 96375 TX/PRO/DX INJ NEW DRUG ADDON: CPT

## 2019-09-06 PROCEDURE — 65660000000 HC RM CCU STEPDOWN

## 2019-09-06 PROCEDURE — 36415 COLL VENOUS BLD VENIPUNCTURE: CPT

## 2019-09-06 PROCEDURE — 96365 THER/PROPH/DIAG IV INF INIT: CPT

## 2019-09-06 PROCEDURE — 87040 BLOOD CULTURE FOR BACTERIA: CPT

## 2019-09-06 PROCEDURE — 80053 COMPREHEN METABOLIC PANEL: CPT

## 2019-09-06 PROCEDURE — 74011000258 HC RX REV CODE- 258: Performed by: EMERGENCY MEDICINE

## 2019-09-06 PROCEDURE — 74011250636 HC RX REV CODE- 250/636: Performed by: HOSPITALIST

## 2019-09-06 RX ORDER — HYDROMORPHONE HYDROCHLORIDE 1 MG/ML
1 INJECTION, SOLUTION INTRAMUSCULAR; INTRAVENOUS; SUBCUTANEOUS
Status: DISCONTINUED | OUTPATIENT
Start: 2019-09-06 | End: 2019-09-06

## 2019-09-06 RX ORDER — ACETAMINOPHEN 325 MG/1
650 TABLET ORAL 4 TIMES DAILY
Status: DISCONTINUED | OUTPATIENT
Start: 2019-09-06 | End: 2019-09-07 | Stop reason: HOSPADM

## 2019-09-06 RX ORDER — HYDROMORPHONE HYDROCHLORIDE 2 MG/ML
2 INJECTION, SOLUTION INTRAMUSCULAR; INTRAVENOUS; SUBCUTANEOUS
Status: DISCONTINUED | OUTPATIENT
Start: 2019-09-06 | End: 2019-09-06

## 2019-09-06 RX ORDER — ONDANSETRON 2 MG/ML
4 INJECTION INTRAMUSCULAR; INTRAVENOUS
Status: DISCONTINUED | OUTPATIENT
Start: 2019-09-06 | End: 2019-09-07 | Stop reason: HOSPADM

## 2019-09-06 RX ORDER — HYDROMORPHONE HYDROCHLORIDE 1 MG/ML
1 INJECTION, SOLUTION INTRAMUSCULAR; INTRAVENOUS; SUBCUTANEOUS
Status: DISCONTINUED | OUTPATIENT
Start: 2019-09-06 | End: 2019-09-07

## 2019-09-06 RX ORDER — KETOROLAC TROMETHAMINE 30 MG/ML
15 INJECTION, SOLUTION INTRAMUSCULAR; INTRAVENOUS
Status: COMPLETED | OUTPATIENT
Start: 2019-09-06 | End: 2019-09-06

## 2019-09-06 RX ORDER — POTASSIUM CHLORIDE 7.45 MG/ML
10 INJECTION INTRAVENOUS
Status: COMPLETED | OUTPATIENT
Start: 2019-09-06 | End: 2019-09-07

## 2019-09-06 RX ORDER — LORAZEPAM 2 MG/ML
0.5 INJECTION INTRAMUSCULAR
Status: DISCONTINUED | OUTPATIENT
Start: 2019-09-06 | End: 2019-09-06

## 2019-09-06 RX ORDER — KETOROLAC TROMETHAMINE 30 MG/ML
15 INJECTION, SOLUTION INTRAMUSCULAR; INTRAVENOUS
Status: DISCONTINUED | OUTPATIENT
Start: 2019-09-06 | End: 2019-09-07 | Stop reason: HOSPADM

## 2019-09-06 RX ADMIN — POTASSIUM CHLORIDE 10 MEQ: 10 INJECTION, SOLUTION INTRAVENOUS at 22:30

## 2019-09-06 RX ADMIN — VANCOMYCIN HYDROCHLORIDE 1000 MG: 1 INJECTION, POWDER, LYOPHILIZED, FOR SOLUTION INTRAVENOUS at 14:46

## 2019-09-06 RX ADMIN — KETOROLAC TROMETHAMINE 15 MG: 30 INJECTION, SOLUTION INTRAMUSCULAR at 15:00

## 2019-09-06 RX ADMIN — KETOROLAC TROMETHAMINE 15 MG: 30 INJECTION, SOLUTION INTRAMUSCULAR at 17:30

## 2019-09-06 RX ADMIN — ACETAMINOPHEN 650 MG: 325 TABLET, FILM COATED ORAL at 22:26

## 2019-09-06 RX ADMIN — HYDROMORPHONE HYDROCHLORIDE 1 MG: 1 INJECTION, SOLUTION INTRAMUSCULAR; INTRAVENOUS; SUBCUTANEOUS at 22:26

## 2019-09-06 RX ADMIN — CEFEPIME HYDROCHLORIDE 2 G: 2 INJECTION, POWDER, FOR SOLUTION INTRAVENOUS at 14:05

## 2019-09-06 RX ADMIN — KETOROLAC TROMETHAMINE 15 MG: 30 INJECTION, SOLUTION INTRAMUSCULAR at 14:21

## 2019-09-06 RX ADMIN — HYDROMORPHONE HYDROCHLORIDE 2 MG: 2 INJECTION, SOLUTION INTRAMUSCULAR; INTRAVENOUS; SUBCUTANEOUS at 18:36

## 2019-09-06 RX ADMIN — ONDANSETRON 4 MG: 2 INJECTION INTRAMUSCULAR; INTRAVENOUS at 18:37

## 2019-09-06 RX ADMIN — SODIUM CHLORIDE 1000 ML: 900 INJECTION, SOLUTION INTRAVENOUS at 12:51

## 2019-09-06 NOTE — PROGRESS NOTES
Day #1 of Cefepime  Indication:  SSTI  Current regimen:  2g IV q8h  Abx regimen: Cefepime + Vanc  Recent Labs     19  1248 19  0452 19  0451 19  1104   WBC 10.0  --  10.1 15.0*   CREA 0.68 0.76  --  0.80   BUN 10 12  --  12     Est CrCl: 88.3 ml/min; UO: n/a  Temp (24hrs), Av.1 °F (36.7 °C), Min:98.1 °F (36.7 °C), Max:98.1 °F (36.7 °C)    Cultures:    Blood pending    Plan: Change to 2g IV q12h for skin and soft tissue infection with CrCl > 60 ml/min

## 2019-09-06 NOTE — PROGRESS NOTES
Admission Medication Reconciliation:    Information obtained from:  Patient  RxQuery data available¹:  NO    Comments/Recommendations: Updated PTA meds/reviewed patient's allergies. 1.  Patient reports she is currently taking no medications. 2.  Medication changes (since last review): Added  - none    Adjusted  - none    Removed  - methadone: patient had only received on 9/3 and 9/4 during admission. Reports she has not had any doses since then. ¹RxQuery pharmacy benefit data reflects medications filled and processed through the patient's insurance, however   this data does NOT capture whether the medication was picked up or is currently being taken by the patient. Allergies:  Patient has no known allergies.     Significant PMH/Disease States:   Past Medical History:   Diagnosis Date    Hepatitis C infection     Smoker      Chief Complaint for this Admission:    Chief Complaint   Patient presents with    Medication Management     Prior to Admission Medications:   None

## 2019-09-06 NOTE — ED PROVIDER NOTES
53 y.o. female with past medical history significant for Hepatitis C who presents from home via private vehicle with chief complaint of right shoulder pain. Pt left AMA from hospital last night but returns today for persisting right shoulder pain s/p arthroscopy. She also notes her left wrist and left knee are sore at incision sites. She is requesting readmission to receive IV antibiotics. Pt reports chronic cough. She admits to using heroin 3 days ago and reports she was given Methadone while admitted. Prior to recent admission, pt last took methadone 4 months ago. Pt specifically denies fever, chills, dysuria, changes in urinary frequency, hematuria, vomiting, diarrhea, and any other pain or symptoms. There are no other acute medical concerns at this time. Chart Review:  Pt was admitted on 9/2/19, had negative blood culture growth after 4 days. On 9/3/19, she was negative for MRSA; gram stain showed white blood cells and gram negative rods. She also had arthroscopic incision and drainage of L knee, arthroscopic debridement and irrigation of R shoulder, and open incision and drainage of L wrist. L knee arthrocentesis collected 50 mL fluid on 9/3/19 that was grossly cloudy: 11722 WBC with WBCS, 90% neutrophils, and occasional gram negative rods.  of Infectious Disease planned to administer 4 weeks IV treatment for joint infection. IV started for Cefepime and Vancomycin on 9/2/19. PIC line was pulled when she left AMA yesterday. Pt had septic tenosynovitis of R middle finger 02/2019, culture showing MRSA at that time. Social hx: Current everyday tobacco smoker (1 ppd); Denies EtOH use; Methadone drug use  PCP: None    Note written by Allison Rivera, as dictated by Deb Clinton MD 10:58 AM    The history is provided by the patient and medical records. No  was used.         Past Medical History:   Diagnosis Date    Hepatitis C infection     Smoker        Past Surgical History:   Procedure Laterality Date    BREAST SURGERY PROCEDURE UNLISTED      lumpectomy    HX GYN               No family history on file. Social History     Socioeconomic History    Marital status: SINGLE     Spouse name: Not on file    Number of children: Not on file    Years of education: Not on file    Highest education level: Not on file   Occupational History    Not on file   Social Needs    Financial resource strain: Not on file    Food insecurity:     Worry: Not on file     Inability: Not on file    Transportation needs:     Medical: Not on file     Non-medical: Not on file   Tobacco Use    Smoking status: Current Every Day Smoker     Packs/day: 1.00    Smokeless tobacco: Never Used   Substance and Sexual Activity    Alcohol use: No     Frequency: Never    Drug use: Yes     Types: Prescription     Comment: pt is on methadone    Sexual activity: Not on file   Lifestyle    Physical activity:     Days per week: Not on file     Minutes per session: Not on file    Stress: Not on file   Relationships    Social connections:     Talks on phone: Not on file     Gets together: Not on file     Attends Rastafari service: Not on file     Active member of club or organization: Not on file     Attends meetings of clubs or organizations: Not on file     Relationship status: Not on file    Intimate partner violence:     Fear of current or ex partner: Not on file     Emotionally abused: Not on file     Physically abused: Not on file     Forced sexual activity: Not on file   Other Topics Concern    Not on file   Social History Narrative    Not on file         ALLERGIES: Patient has no known allergies. Review of Systems   Constitutional: Negative for appetite change, chills and fever. HENT: Negative for rhinorrhea, sore throat and trouble swallowing. Eyes: Negative for photophobia. Respiratory: Positive for cough. Negative for shortness of breath.     Cardiovascular: Negative for chest pain and palpitations. Gastrointestinal: Negative for abdominal pain, diarrhea, nausea and vomiting. Genitourinary: Negative for dysuria, frequency and hematuria. Musculoskeletal: Positive for arthralgias. Skin: Positive for wound. Neurological: Negative for dizziness, syncope and weakness. Psychiatric/Behavioral: Negative for behavioral problems. The patient is not nervous/anxious. All other systems reviewed and are negative. Vitals:    09/06/19 1016 09/06/19 1040   BP:  (!) 158/93   Pulse: 88 80   Resp:  16   Temp:  98.1 °F (36.7 °C)   SpO2: 95% 95%            Physical Exam   Constitutional: She appears well-developed and well-nourished. HENT:   Head: Normocephalic and atraumatic. Mouth/Throat: Oropharynx is clear and moist.   Eyes: Pupils are equal, round, and reactive to light. EOM are normal.   Neck: Normal range of motion. Neck supple. Cardiovascular: Normal rate, regular rhythm, normal heart sounds and intact distal pulses. Exam reveals no gallop and no friction rub. No murmur heard. Pulmonary/Chest: Effort normal. No respiratory distress. She has no wheezes. She has no rales. Abdominal: Soft. There is no tenderness. There is no rebound. Musculoskeletal: Normal range of motion. She exhibits no tenderness. Neurological: She is alert. No cranial nerve deficit. Motor; symmetric   Skin: No erythema. Pt has 2 arthroscopic sites inferior to L knee with dingle suture at each site. Also has arthroscopic site over anterior L wrist with several sutures, and on R shoulder with single suture. Psychiatric: She has a normal mood and affect. Her behavior is normal.   Nursing note and vitals reviewed.      Note written by Allison Sanon, as dictated by Gavin Cardona MD 10:58 AM    [facility]       Procedures    Hospitalist John for Admission  2:00 PM    ED Room Number: ER06/06  Patient Name and age:  Chetna Arteaga 53 y.o.  female  Working Diagnosis: 1. Arthritis of multiple sites due to other bacteria [facility])      Readmission: yes  Isolation Requirements:  no  Recommended Level of Care:  med/surg  Code Status:  Full Code  Department:[facility] ED - [phone]  Other:  AMA yesterday

## 2019-09-06 NOTE — ROUTINE PROCESS
TRANSFER - OUT REPORT:    Verbal report given to leonarda(name) on Macrina Handler  being transferred to [facility](unit) for routine progression of care       Report consisted of patients Situation, Background, Assessment and   Recommendations(SBAR). Information from the following report(s) SBAR was reviewed with the receiving nurse. Lines:   Peripheral IV 09/06/19 Right External jugular (Active)        Opportunity for questions and clarification was provided.       Patient transported with:   [employer]

## 2019-09-06 NOTE — PROGRESS NOTES
Pharmacist Note - Vancomycin Dosing- Restart Day #4    Consult provided for this 53 y.o. female for indication of suspected septic arthritis of right shoulder, left wrist, left knee.  -patient left AMA yesterday; last dose  @1339  Antibiotic regimen(s): Vanc + Cefepime    Recent Labs     19  1248 19  0452 19  0451 19  1104   WBC 10.0  --  10.1 15.0*   CREA 0.68 0.76  --  0.80   BUN 10 12  --  12     Frequency of BMP: daily x 3  Height: 162.6 cm  Weight: 60.6 kg  Est CrCl: 88 ml/min; UO: -- ml/kg/hr  Temp (24hrs), Av °F (36.7 °C), Min:97.5 °F (36.4 °C), Max:98.4 °F (36.9 °C)    Cultures:   Blood, paired: NGTD  9/3 MRSA Nares: negative  9/3 Knee fluid, body fluid:NGTD (abx were started prior to this culture being taken)    Goal trough = 15 - 20 mcg/mL    Patient was receiving 1000 mg Q 12hr prior to leaving AMA. Received 1000 mg in ER @ 1446. Will resume 1000 mg q12h. Pharmacy to follow patient daily and order levels / make dose adjustments as appropriate.

## 2019-09-06 NOTE — PROGRESS NOTES
Date of previous inpatient admission/ ED visit? Last IP admission was 09/02/2018 to yesterday, 09/06/2019, for pyogenic arthritis of right shoulder region. Pt RRAT score is 8- LOW. What brought the patient back to ED? Pt left AMA after being admitted on 9/2/19 for multiple septic joints; wants to be seen by a doctor regarding readmission. Did patient decline recommended services during last admission/ ED visit (if yes, what)? Yes, pt left AMA yesterday. Has patient seen a provider since their last inpatient admission/ED visit (if yes, when)? No    CM Interventions:  From previous inpatient admission/ED visit: Pt left AMA; no CM needs noted. From current inpatient admission/ED visit: Pt being evaluated in the ED at this time. Disposition needs TBD/subject to change pending care recommendations. CM will assist with disposition needs as they arise. CM met with pt at bedside to discuss CM role and to assess pt needs. CM verified demographic information including emergency contact. CM verified pt insurance; pt reports her Medicaid is currently not active. CM gave pt Care Card application, [facility] application, and uninsured resources. CM discussed resources for drug cessation; pt declined reporting appointments arranged for Methadone clinic. Care Management Interventions  PCP Verified by CM: No  Palliative Care Criteria Met (RRAT>21 & CHF Dx)?: No  Mode of Transport at Discharge: Other (see comment)(Family)  Transition of Care Consult (CM Consult):  Other(Readmission Evaluation)  MyChart Signup: No  Discharge Durable Medical Equipment: No  Physical Therapy Consult: No  Occupational Therapy Consult: No  Speech Therapy Consult: No  Current Support Network: Own Home, Lives Alone  Confirm Follow Up Transport: Family  Plan discussed with Pt/Family/Caregiver: Yes  Discharge Location  Discharge Placement: Home(Disposition TBD/subject to change pending care recommendations)    Micheal Oliva RN, BSN  Care Management Department

## 2019-09-06 NOTE — ED TRIAGE NOTES
Admitted Monday 9/2 for multiple septic joints including LEFT knee, LEFT wrist and RIGHT shoulder. Pt states she was given ativan last night and doesn't remember what time she left AMA. Wounds to knee, wrist and shoulder with sutures in place, no surrounding redness or swelling.       Multiple track marks to legs and arms, IV drug user x 20 years

## 2019-09-06 NOTE — PROGRESS NOTES
1648 :   Pt arrived to [phone]:    During assessment pt was notably more anxious and fidgeting. Pt stated that she likely is going through withdrawals, pt stated that she last took heroin last night. Ld GAGE.     1283:    Called to bedside by pt. Pt had an episode of emesis after attempting to eat dinner. Provided pt with PRN diluadid and zofran with cool wash cloth. 1928:   Spoke with Dr. Pola Garcia she stated that pt may receive klonopine if pt continues to withdrawal symptoms. Will pass onto night shift.

## 2019-09-06 NOTE — ED NOTES
10:29 AM  I have evaluated the patient as the Provider in Triage. I have reviewed Her vital signs and the triage nurse assessment. I have talked with the patient and any available family and advised that I am the provider in triage and have ordered the appropriate study to initiate their work up based on the clinical presentation during my assessment. I have advised that the patient will be accommodated in the Main ED as soon as possible. I have also requested to contact the triage nurse or myself immediately if the patient experiences any changes in their condition during this brief waiting period. Blake Ledezma NP    Pt left AMA after being admitted on 9/2/19 for multiple septic joints; wants to be seen by a doctor regarding readmission.  Blake Ledezma NP

## 2019-09-06 NOTE — PROGRESS NOTES
TRANSFER - IN REPORT:    Verbal report received from Alonso(name) on Kun Cerrato  being received from ED(unit) for routine post - op      Report consisted of patients Situation, Background, Assessment and   Recommendations(SBAR). Information from the following report(s) SBAR, Kardex, Intake/Output, MAR, Recent Results and Cardiac Rhythm NSR was reviewed with the receiving nurse. Opportunity for questions and clarification was provided. Assessment completed upon patients arrival to unit and care assumed.

## 2019-09-06 NOTE — CONSULTS
ORTHO CONSULT NOTE    Date of Consultation:  2019  Referring Physician:  Patricia Shah  CC: joint pain    HPI:  Campos Austin is a 53 y.o. female PMH IVDU and previous hand MRSA tenosynovitis 2019 who was recently admitted 19 for suspected septic arthritis and left AMA yesterday. She had left knee arthrocentesis 9/3/19 with 57k TNC and GNR on gram stain. She was taken to OR 9/3/19 for scope I&D of right shoulder and left knee combined with open I&D of left wrist.  She returns to ER with c/o severe right shoulder pain. She continues with inability to tolerate any active or passive ROM right shoulder. She has left knee stiffness but no pain. She has somewhat improved left wrist AROM with some pain. She denies fever and denies drainage. Past Medical History:   Diagnosis Date    Hepatitis C infection     Smoker       Past Surgical History:   Procedure Laterality Date    BREAST SURGERY PROCEDURE UNLISTED      lumpectomy    HX GYN            No family history on file. Social History     Tobacco Use    Smoking status: Current Every Day Smoker     Packs/day: 1.00    Smokeless tobacco: Never Used   Substance Use Topics    Alcohol use: No     Frequency: Never     Prior to Admission medications    Not on File        No Known Allergies     Review of Systems:  Per HPI. Objective:     Patient Vitals for the past 8 hrs:   BP Temp Pulse Resp SpO2   19 1330 (!) 209/89 -- -- -- 94 %   19 1200 175/85 -- -- -- 94 %   19 1130 175/90 -- -- -- 92 %   19 1040 (!) 158/93 98.1 °F (36.7 °C) 80 16 95 %   19 1016 -- -- 88 -- 95 %     Temp (24hrs), Av.1 °F (36.7 °C), Min:98.1 °F (36.7 °C), Max:98.1 °F (36.7 °C)      EXAM:   NAD. She ambulates in hallway returning from bathroom. Left knee portal incisions healing with nylon intact. No dehiscence, no erythema, and no drainage. Mild knee effusion. AROM 0-90.   Right shoulder portal incisions healing with nylon intact. No dehiscence, no erythema, and no drainage. She is very TTP globally in shoulder. She has some internal/external rotation but abduct 15, flex < 15. Min effusion. Left wrist incision healing with nylon intact. No dehiscence, no erythema, and no drainage. Min swelling, OK AROM. Labs:   WBC 10.0  BUN 10, Cr 0.68, eGFR > 60. Impression:     Patient Active Problem List    Diagnosis Date Noted    Septic joint of left knee joint ([address] 09/06/2019    Pyogenic arthritis of right shoulder region ([address] 09/02/2019    Septic arthritis ([address] 09/02/2019    Infection of hand 02/22/2019    Cellulitis of hand 02/19/2019     Active Problems:    Septic joint of left knee joint ([address] (9/6/2019)        Plan:   Cont work-up and IV antibiotics per ID recs. No plans for further Ortho procedures. Band-aids for incisions. WBAT LLE. Dr. Nhan Rios is aware and agrees with above plan.       TIM Loera  Orthopedic Trauma Service  [facility]

## 2019-09-07 VITALS
HEIGHT: 64 IN | TEMPERATURE: 98 F | SYSTOLIC BLOOD PRESSURE: 149 MMHG | BODY MASS INDEX: 22.51 KG/M2 | RESPIRATION RATE: 22 BRPM | OXYGEN SATURATION: 100 % | HEART RATE: 52 BPM | DIASTOLIC BLOOD PRESSURE: 86 MMHG | WEIGHT: 131.84 LBS

## 2019-09-07 LAB
ANION GAP SERPL CALC-SCNC: 11 MMOL/L (ref 5–15)
ATRIAL RATE: 66 BPM
BACTERIA SPEC CULT: NORMAL
BASOPHILS # BLD: 0 K/UL (ref 0–0.1)
BASOPHILS NFR BLD: 0 % (ref 0–1)
BUN SERPL-MCNC: 11 MG/DL (ref 6–20)
BUN/CREAT SERPL: 17 (ref 12–20)
CALCIUM SERPL-MCNC: 8.7 MG/DL (ref 8.5–10.1)
CALCULATED P AXIS, ECG09: 55 DEGREES
CALCULATED R AXIS, ECG10: 41 DEGREES
CALCULATED T AXIS, ECG11: 40 DEGREES
CHLORIDE SERPL-SCNC: 106 MMOL/L (ref 97–108)
CO2 SERPL-SCNC: 22 MMOL/L (ref 21–32)
CREAT SERPL-MCNC: 0.66 MG/DL (ref 0.55–1.02)
DIAGNOSIS, 93000: NORMAL
DIFFERENTIAL METHOD BLD: ABNORMAL
EOSINOPHIL # BLD: 0.1 K/UL (ref 0–0.4)
EOSINOPHIL NFR BLD: 1 % (ref 0–7)
ERYTHROCYTE [DISTWIDTH] IN BLOOD BY AUTOMATED COUNT: 13.3 % (ref 11.5–14.5)
GLUCOSE SERPL-MCNC: 96 MG/DL (ref 65–100)
HCT VFR BLD AUTO: 38.9 % (ref 35–47)
HGB BLD-MCNC: 13.3 G/DL (ref 11.5–16)
IMM GRANULOCYTES # BLD AUTO: 0.1 K/UL (ref 0–0.04)
IMM GRANULOCYTES NFR BLD AUTO: 1 % (ref 0–0.5)
LYMPHOCYTES # BLD: 1.5 K/UL (ref 0.8–3.5)
LYMPHOCYTES NFR BLD: 15 % (ref 12–49)
MCH RBC QN AUTO: 32.2 PG (ref 26–34)
MCHC RBC AUTO-ENTMCNC: 34.2 G/DL (ref 30–36.5)
MCV RBC AUTO: 94.2 FL (ref 80–99)
MONOCYTES # BLD: 1 K/UL (ref 0–1)
MONOCYTES NFR BLD: 9 % (ref 5–13)
NEUTS SEG # BLD: 7.5 K/UL (ref 1.8–8)
NEUTS SEG NFR BLD: 74 % (ref 32–75)
NRBC # BLD: 0 K/UL (ref 0–0.01)
NRBC BLD-RTO: 0 PER 100 WBC
P-R INTERVAL, ECG05: 160 MS
PLATELET # BLD AUTO: 301 K/UL (ref 150–400)
PMV BLD AUTO: 10 FL (ref 8.9–12.9)
POTASSIUM SERPL-SCNC: 4 MMOL/L (ref 3.5–5.1)
Q-T INTERVAL, ECG07: 420 MS
QRS DURATION, ECG06: 82 MS
QTC CALCULATION (BEZET), ECG08: 440 MS
RBC # BLD AUTO: 4.13 M/UL (ref 3.8–5.2)
SERVICE CMNT-IMP: NORMAL
SODIUM SERPL-SCNC: 139 MMOL/L (ref 136–145)
VENTRICULAR RATE, ECG03: 66 BPM
WBC # BLD AUTO: 10.2 K/UL (ref 3.6–11)

## 2019-09-07 PROCEDURE — 36415 COLL VENOUS BLD VENIPUNCTURE: CPT

## 2019-09-07 PROCEDURE — 80048 BASIC METABOLIC PNL TOTAL CA: CPT

## 2019-09-07 PROCEDURE — 74011250636 HC RX REV CODE- 250/636: Performed by: HOSPITALIST

## 2019-09-07 PROCEDURE — 74011000258 HC RX REV CODE- 258: Performed by: HOSPITALIST

## 2019-09-07 PROCEDURE — 85025 COMPLETE CBC W/AUTO DIFF WBC: CPT

## 2019-09-07 PROCEDURE — 74011250636 HC RX REV CODE- 250/636

## 2019-09-07 PROCEDURE — 74011250636 HC RX REV CODE- 250/636: Performed by: NURSE PRACTITIONER

## 2019-09-07 RX ORDER — SODIUM CHLORIDE 0.9 % (FLUSH) 0.9 %
5-40 SYRINGE (ML) INJECTION EVERY 8 HOURS
Status: DISCONTINUED | OUTPATIENT
Start: 2019-09-07 | End: 2019-09-07 | Stop reason: HOSPADM

## 2019-09-07 RX ORDER — SODIUM CHLORIDE 0.9 % (FLUSH) 0.9 %
5-40 SYRINGE (ML) INJECTION AS NEEDED
Status: DISCONTINUED | OUTPATIENT
Start: 2019-09-07 | End: 2019-09-07 | Stop reason: HOSPADM

## 2019-09-07 RX ORDER — POTASSIUM CHLORIDE 7.45 MG/ML
INJECTION INTRAVENOUS
Status: COMPLETED
Start: 2019-09-07 | End: 2019-09-07

## 2019-09-07 RX ORDER — POTASSIUM CHLORIDE 7.45 MG/ML
INJECTION INTRAVENOUS
Status: DISCONTINUED
Start: 2019-09-07 | End: 2019-09-07 | Stop reason: HOSPADM

## 2019-09-07 RX ORDER — HYDROMORPHONE HYDROCHLORIDE 1 MG/ML
1 INJECTION, SOLUTION INTRAMUSCULAR; INTRAVENOUS; SUBCUTANEOUS
Status: DISCONTINUED | OUTPATIENT
Start: 2019-09-07 | End: 2019-09-07 | Stop reason: HOSPADM

## 2019-09-07 RX ADMIN — KETOROLAC TROMETHAMINE 15 MG: 30 INJECTION, SOLUTION INTRAMUSCULAR at 05:13

## 2019-09-07 RX ADMIN — VANCOMYCIN HYDROCHLORIDE 1000 MG: 1 INJECTION, POWDER, LYOPHILIZED, FOR SOLUTION INTRAVENOUS at 03:41

## 2019-09-07 RX ADMIN — POTASSIUM CHLORIDE 10 MEQ: 10 INJECTION, SOLUTION INTRAVENOUS at 02:45

## 2019-09-07 RX ADMIN — HYDROMORPHONE HYDROCHLORIDE 1 MG: 1 INJECTION, SOLUTION INTRAMUSCULAR; INTRAVENOUS; SUBCUTANEOUS at 07:30

## 2019-09-07 RX ADMIN — POTASSIUM CHLORIDE 10 MEQ: 10 INJECTION, SOLUTION INTRAVENOUS at 00:55

## 2019-09-07 RX ADMIN — ONDANSETRON 4 MG: 2 INJECTION INTRAMUSCULAR; INTRAVENOUS at 11:36

## 2019-09-07 RX ADMIN — HYDROMORPHONE HYDROCHLORIDE 1 MG: 1 INJECTION, SOLUTION INTRAMUSCULAR; INTRAVENOUS; SUBCUTANEOUS at 03:40

## 2019-09-07 RX ADMIN — POTASSIUM CHLORIDE 10 MEQ: 10 INJECTION, SOLUTION INTRAVENOUS at 00:15

## 2019-09-07 RX ADMIN — CEFEPIME HYDROCHLORIDE 2 G: 2 INJECTION, POWDER, FOR SOLUTION INTRAVENOUS at 01:30

## 2019-09-07 RX ADMIN — HYDROMORPHONE HYDROCHLORIDE 1 MG: 1 INJECTION, SOLUTION INTRAMUSCULAR; INTRAVENOUS; SUBCUTANEOUS at 11:28

## 2019-09-07 NOTE — H&P
History and Physical  Primary Care Provider: None    Subjective:     Chelsie Lopez is a 53 y.o. female with PMH of substance abuse -IV heroin,cocaine,tobacco abuse,history of MRSA infection of hand,chronic hep C came to the hospital with cc of multiple joint pains. Patient states that after going home last night,she sniffed heroin. She complaints of 7/10 intensity constant dull joint pain - mainly her shoulder and to some extent in her left wrist and left knee. She denied any fever,nausea/vomiting,abdominal pain,chest pain,SOB. Patient known to our service,she was admitted to the hospital on 2019 for multiple joint pains -rt shoulder,left knee and left wrist-  Rt shoulder aspiration was unsuccessful in the ER,satrted on vanc and cefepime. She underwent left knee aspiration on 9/3  which showed 57,000 WBCs,gram stain showed gram negative rods. She went to OR -s/p arthroscopic I and D of left knee,debridement and irrigation of rt shoulder and open I and D left wrist on 9/3/2019. She was receiving medications to treat withdrawal symptoms from the heroin as well. On  - patient left AMA despite explaining multiple times that she needs IV for 4 weeks and the risk of further worsening of infection. Review of Systems:    A comprehensive review of systems was negative except for that written in the History of Present Illness. Past Medical History:   Diagnosis Date    Hepatitis C infection     Smoker       Past Surgical History:   Procedure Laterality Date    BREAST SURGERY PROCEDURE UNLISTED      lumpectomy    HX GYN           Prior to Admission medications    Not on File     No Known Allergies     Family history : reviewed,non contributory   SOCIAL HISTORY:  Patient resides at home.   Patient ambulates independtly  Smoking history: smokes 1 PPD  Alcohol history: denies use of alcohol  Illicit drug abuse: cocaine and heroin IV        Objective:       Physical Exam:   Gen: Well-developed, well-nourished, in no acute distress  HEENT:   PERRL, EOMI,anicteric, no pallor,hearing intact to voice, moist mucous membranes,sinus non tender  Neck:  Supple, without masses, thyroid non-tender  Resp:  No accessory muscle use, clear breath sounds without wheezes rales or rhonchi  Card:  No murmurs, normal S1, S2 without thrills  Abd:  Soft, non-tender, non-distended, normoactive bowel sounds are present  Musc:   sutures on the left knee,rt shoulder and left wrist, rt shoudler tender to palpate. Skin:  needle marks on b/l LE  Neuro:  alert and oriented X 3, muscle strength 5/5,sensation intact             Data Review: All diagnostic labs and studies have been reviewed. Xr Chest Port    Result Date: 9/6/2019  IMPRESSION: No Acute Disease. Assessment:     Active Problems:    Septic joint of left knee joint ([address] (9/6/2019)      # Suspected septic arthritis -left knee,rt shoulder and left wrist:  -s/p arthroscopic I and D of left knee,debridement and irrigation of rt shoulder and open I and D left wrist  -cultures so far negative, gram stain showed gram negative rods  -blood cultures sent again today  -continue vanc and cefepime -discussed with the patient that she needs 4 weeks of IV abx and that she should remain in the hospital for the treatment. -reconsult ortho and ID  -Urine sent for GC/chlamydia  -Gyn consult for pelvic exam to obtain cervical/rectal culture for GC/chlamydia to rule out disseminated GC infection. #Substance abuse: IV heroin and cocaine abuse  -UDS again this admission positive for cocaine and opiates. Patient admits use of heroin last night  -Patient asked that she wants to be on methadone while inpatient -discussed with the patient  that we will not prescribe methadone while inpatient as she did not go for methadone clinic since February and she needs to establish at a methadone maintainace clinic after discharge.   For withdrawal symptoms we will use clonidine if BP and HR tolerates,  prn IV dilaudid (also for pain management) or ativan.  -prn zofran.  -counseled to quit    #Chronic hep C:  -Qt PCR in 3/2019-5,670,000   -OP follow up. RN paged me that patient is experiencing withdrawal symptoms. HR was 50s and BP 160s  -As HR on the lower side,will give IV dilaudid for now and use clonidine later if HR improves.     Signed By: Izzy Hernandez MD     September 6, 2019

## 2019-09-07 NOTE — PROGRESS NOTES
Problem: Patient Education: Go to Patient Education Activity  Goal: Patient/Family Education  Outcome: Progressing Towards Goal     Problem: Falls - Risk of  Goal: *Absence of Falls  Description  Document Sowmya Lerner Fall Risk and appropriate interventions in the flowsheet.   Outcome: Progressing Towards Goal  Note:   Fall Risk Interventions:            Medication Interventions: Teach patient to arise slowly

## 2019-09-07 NOTE — DISCHARGE SUMMARY
Discharge Summary       PATIENT ID: Romeo Lr  MRN: [id number]   YOB: 1970    DATE OF ADMISSION: 9/2/2019  7:05 PM    DATE OF DISCHARGE: 9/5/2019  PRIMARY CARE PROVIDER: None     ATTENDING PHYSICIAN: Sara Christian MD    DISCHARGING PROVIDER: Marjorie Turner MD    To contact this individual call [phone] and ask the  to page. If unavailable ask to be transferred the Adult Hospitalist Department. CONSULTATIONS: IP CONSULT TO ORTHOPEDIC SURGERY    PROCEDURES/SURGERIES: Procedure(s):  arthroscopic I&D Left Knee  arthroscopic debridement and irrigation of  right shoulder. open I&D Left wrist    [id number] [facility] COURSE:   78-year-old woman with a past medical history significant for hepatitis C infection, intravenous drug abuse, was in her usual state of health until the day of her presentation at the emergency room when the patient developed generalized body aches and pain.  The patient is complaining of pain, specifically in the right shoulder. She also had some left knee pain. Ortho was consulted,Shoulder aspiration could not be performed in the ER. Was started on empiric abx and was admitted for further eval.    #Suspected septic arthritis of rt shoulder,left wrist and left knee  -s/p knee aspiration which so far on gram stain showed gram negative rods,cultures NGTD  -b/c negative so far  -continue vanc and cefepime. PICC line placed on 9/4 due to poor IV access. -s/p arthroscopic I and D of left knee,debridement and irrigation of rt shoulder and open I and D left wrist.  -ortho and ID following  -ID recommended GYN consult for cervical cultures to rule out disseminated gonococcal arthritis  -ID team recommended 4 weeks of IV abx in the hospital.  -Scheduled tylenol, prn  toradol and 5 mg roxicodone Q 6 hr prn for pain management.     #Substanace abuse:  -UDS positive for cocaine and opiates. Patient reports injecting heroin every day prior to admission.  -counselled to quit. HIV negative  -Patient was in methadone program in 2/2019 but later relapsed and started using IV heroin. She has not been to methadone clinic for several months  - Discussed with  at methadone clinic on 9/5/2019 - he said patient needs to re enroll in methadone clinic after discharge. Can use of IV dilaudid or clonidine or ativan for withdrawal symptoms while in the hospital.  Patient insisted on using methadone only while she is in the hospital - I told her that we are doing short taper course and we cannot give methadone  for 4 weeks as she is currently not established in any methadone clinic.        #Chronic hep C:  -Qt PCR in 3/2019-5,670,000   -OP follow up.     Hyponatrmia:improved     Chronic tobacco use: nicotine patch.  -Counseled to quit. Constitutional:  middle aged female   ENT:  Oral mucous dry, oropharynx benign. Neck supple,    Resp:  CTA bilaterally. No wheezing/rhonchi/rales. No accessory muscle use   CV:  Regular rhythm, normal rate, no murmurs    GI:  Soft, non distended, non tender. normoactive bowel sounds    Musculoskeletal:  left wrist,rt shoulder and left knee dry dressing +  Skin: needle marks on both LE    Neurologic:  Moves all extremities. AAOx3             Code atlas called on 9/5 evening  as patient walked down the hallway and went down -trying to leave the hospital. She escorted back to the room. I came to the room - patient said that she wants to the leave the hospital. I explained her again that she has multiple septic joints and needs Iv antibiotics - duration atleast 4 weeks was recommended by  for the treatment and needs to remain in the hospital.Patient repeatedly said she cannot stay that long and wants to leave the hospital.I explained her if we do not treat this infection it can destroy her joints,infection can worsen and even cause death.  She is alert and oriented X 4, verbalized  that she understands all the risks and still wants to leave the hospital against medical advise.            PICC line removed prior to patient leaving AMA. Friend at bed side will transport her.                   Lindsey Foster MD  9/6/2019  11:32 PM

## 2019-09-07 NOTE — PROGRESS NOTES
Infectious Diseases Consultation     Please see dictated note     Impression      1. Septic arthritis of left knee and possibly right shoulder and left wrist -- etiologic microorganism not yet determined     2. Hx of chronic Hep C -- gentype 1a     3. IDU     Recommend:      1.  Vancomycin + Cefepime    ThanksMarita MD  9/6/2019  8:44 PM

## 2019-09-07 NOTE — CONSULTS
[address]    Name:  Kalen Mcfadden  MR#:  [id number]  :  1970  ACCOUNT #:  [de-identified]  DATE OF SERVICE:  2019    The patient is in 447. Consult was requested by Jazmine Jordan MD.    CHIEF COMPLAINT:  Right shoulder pain. REASON FOR CONSULTATION:  Probable septic arthritis of the left knee, left wrist, and right shoulder in a patient with a history of intravenous drug use. The consultation was requested by Dr. Jazmine Jordan. The history was obtained by interviewing the patient and review of the medical records. HISTORY OF PRESENT ILLNESS:  This patient is a 50-year-old white female with a history of chronic hepatitis C infection - genotype 1a and a history of intravenous drug use. The patient had been on methadone until about 3 months ago when she stopped methadone and resumed using IV heroin. The patient developed left knee pain, right shoulder pain, and left wrist pain on about 2019 or 2019. The pain became so significant that she came to [facility] Emergency Room on 2019. At that time, a right shoulder arthrocentesis was performed, but no fluid was obtained. The patient was started on cefepime with the first dose at 10 p.m. on 2019, and then vancomycin was added that night. The following day, on 2019, she underwent a left knee arthrocentesis yielding 50 mL of \"grossly cloudy fluid. \"  Analysis of the synovial fluid revealed 57,334 wbc's with 90% neutrophils and a negative crystal exam.  Gram stain revealed 2+ white cells and occasional gram-negative rods. On 2019, she underwent arthroscopic lavage of the right shoulder and then the left knee as well as open I and D of the left wrist.  There was significant synovitis noted in the left wrist and right shoulder but very little fluid. I saw the patient on 2019, and discussed treatment plans.   However, on 2019, the patient left the hospital against medical advice. She returned today. She states that she used heroin after she left the hospital AMA, but now has decided to return for further treatment. Today, her major complaint is right shoulder pain. Her left knee pain and left wrist pain have improved. The patient does not recall having fever, chills, sweats, or systemic symptoms. PAST MEDICAL HISTORY:  Tobacco half pack per day. Alcohol, occasional social use. MEDICATIONS PRIOR TO ADMISSION:  None. ALLERGIES:  NONE. ILLNESSES:  Chronic hepatitis C infection - genotype 1a - diagnosed in about . SURGERY:  1. Right breast lumpectomy - benign. 2.  . 3.  I and D of septic tenosynovitis of the right middle finger in 2019, with cultures revealing MRSA. 4.  Arthroscopic I and D of the right shoulder and left knee as well as open I and D of the left wrist on 2019. SOCIAL HISTORY:  The patient has four children (twins as well as two other children). She has worked as a . FAMILY HISTORY:  Positive for diabetes in her father and COPD in her mother. REVIEW OF SYSTEMS:  A full review of systems was unremarkable except as above. PHYSICAL EXAMINATION:  GENERAL:  No acute distress. VITAL SIGNS:  Blood pressure is 126/97, heart rate 75, respiratory rate 18. She is afebrile. HEENT:  Sclerae and conjunctivae are benign, oropharynx benign. NECK:  Supple. NODES:  No adenopathy. SKIN:  No rashes. LUNGS:  Clear. CARDIOVASCULAR:  Regular rate and rhythm without murmurs. ABDOMEN:  Soft, nontender, no masses, bowel sounds are present. BACK:  No CVA tenderness. EXTREMITIES:  Right shoulder; no warmth or erythema, but she does have pain with passive range of motion and with palpation. Left wrist; the surgical I and D site was benign. There was only mild tenderness. Left knee; there is a small-to-moderate residual left knee effusion that is nontender.   She has fairly good range of motion of her left knee. Extremities have no cellulitis. Peripheral pulses intact. NEUROLOGIC:  Alert and oriented. LABORATORY DATA:  CBC reveals a hemoglobin of 13.0, white count 10,000, platelets 861,908. Chemistry data reveals BUN 10, creatinine 0.68 with normal liver function tests. Potassium was 3.2. Microbiology data; blood cultures drawn on 09/02/2019, before antibiotics were given last admission, are still no growth. Culture of the left knee synovial fluid on 09/03/2019, after she had received at least one dose of cefepime and vancomycin, is still negative at 3 days. Cultures of the left wrist and right shoulder were not sent. IMPRESSION:  1. Oligoarticular inflammatory arthritis - presumed septic arthritis - involving the left knee, right shoulder, and left wrist.  At this time, I think we will resume vancomycin and cefepime. Possible gram-negative rods were seen in the left knee synovial fluid, but this is not definitive evidence of gram-negative infection. I will give her vancomycin and cefepime for now pending further cultures. We will also send studies for gonorrhea to rule out disseminated gonococcal infection. 2.  History of chronic hepatitis C infection - genotype 1a. 3.  History of injection drug use. RECOMMENDATIONS:  1. Begin vancomycin and cefepime pending further culture results. 2.  Urine for GC and Chlamydia. 3.  Pelvic exam to send the cervical specimen for GC. We will also send anal and oropharyngeal samples for GC.  4.  I would advice hepatitis A and hepatitis B vaccine series. 5.  The patient will still need to be evaluated for treatment for chronic hepatitis C infection. Thank you very much for allowing us to see this patient with you.       Arti Titus MD      JOSE [id number]  D:  09/07/2019 0:19  T:  09/07/2019 0:30  JOB #:  [id number]  CC:  MD Lisbeth Washington MD

## 2019-09-07 NOTE — PROGRESS NOTES
1245:    Answered pt call light. Pt stated that she was going to leave. Pt removed her own IV and telemetry pack. Instructed pt to please wait while I call the doctor and get her AMA form. When I returned to room to turn off call light pt had already left. 1247:     MD paged. [phone] made aware.     1255:    Dr. Liv Jon was made aware that pt had left the hospital.

## 2019-09-08 NOTE — DISCHARGE SUMMARY
Discharge Summary -Left against medical advise       PATIENT ID: Bobbi Gallegos  MRN: [id number]   YOB: 1970    DATE OF ADMISSION: 9/6/2019 10:32 AM    DATE OF DISCHARGE -left against medical advise: 9/7/2019  PRIMARY CARE PROVIDER: None     ATTENDING PHYSICIAN: Josette Peralta MD    DISCHARGING PROVIDER: Josette Peralta MD    To contact this individual call [phone] and ask the  to page. If unavailable ask to be transferred the Adult Hospitalist Department. CONSULTATIONS: IP CONSULT TO ORTHOPEDIC SURGERY    PROCEDURES/SURGERIES: * No surgery found *    ADMITTING DIAGNOSES & HOSPITAL COURSE:     53 y.o. female with PMH of substance abuse -IV heroin,cocaine,tobacco abuse,history of MRSA infection of hand,chronic hep C came to the hospital with cc of multiple joint pains.     Patient states that after going home on 9/5 night,she sniffed heroin. She complained of 7/10 intensity constant dull joint pain - mainly her shoulder and to some extent in her left wrist and left knee. She denied any fever,nausea/vomiting,abdominal pain,chest pain,SOB.     Patient known to our service,she was admitted to the hospital on 9/2/2019 for multiple joint pains -rt shoulder,left knee and left wrist-  Rt shoulder aspiration was unsuccessful in the ER,satrted on vanc and cefepime. She underwent left knee aspiration on 9/3  which showed 57,000 WBCs,gram stain showed gram negative rods. She went to OR -s/p arthroscopic I and D of left knee,debridement and irrigation of rt shoulder and open I and D left wrist on 9/3/2019. She was receiving medications to treat withdrawal symptoms from the heroin as well. On 9/5 - patient left AMA despite explaining multiple times that she needs IV for 4 weeks and the risk of further worsening of infection.       Suspected septic arthritis -left knee,rt shoulder and left wrist:  -s/p arthroscopic I and D of left knee,debridement and irrigation of rt shoulder and open I and D left wrist  -cultures so far negative, gram stain showed gram negative rods  -blood cultures sent again today  -continue vanc and cefepime -discussed with the patient that she needs 4 weeks of IV abx and that she should remain in the hospital for the treatment. -reconsult ortho and ID  -Urine sent for GC/chlamydia  -Gyn consult for pelvic exam to obtain cervical/rectal culture for GC/chlamydia to rule out disseminated GC infection.     #Substance abuse: IV heroin and cocaine abuse  -UDS again this admission positive for cocaine and opiates. Patient admits use of heroin last night  -Patient asked that she wants to be on methadone while inpatient -discussed with the patient  that we will not prescribe methadone while inpatient as she did not go for methadone clinic since February and she needs to establish at a methadone maintainace clinic after discharge. For withdrawal symptoms we will use clonidine if BP and HR tolerates,  prn IV dilaudid (also for pain management) or ativan.  -prn zofran.  -counseled to quit     #Chronic hep C:  -Qt PCR in 3/2019-5,670,000   -OP follow up.               RN paged me in the afternoon and said patient left the hospital.    I did not see the patient today  prior to her leaving the hospital          Signed:   Penny Ware MD  9/8/2019  12:21 AM

## 2019-09-09 LAB
C TRACH DNA SPEC QL NAA+PROBE: NEGATIVE
N GONORRHOEA DNA SPEC QL NAA+PROBE: NEGATIVE
SAMPLE TYPE: NORMAL
SERVICE CMNT-IMP: NORMAL
SPECIMEN SOURCE: NORMAL

## 2019-09-11 LAB
BACTERIA SPEC CULT: NORMAL
SERVICE CMNT-IMP: NORMAL

## 2019-09-17 LAB
BACTERIA SPEC CULT: NORMAL
BACTERIA SPEC CULT: NORMAL
GRAM STN SPEC: NORMAL
SERVICE CMNT-IMP: NORMAL

## 2020-04-11 ENCOUNTER — APPOINTMENT (OUTPATIENT)
Dept: GENERAL RADIOLOGY | Age: 50
End: 2020-04-11
Attending: PHYSICIAN ASSISTANT
Payer: MEDICAID

## 2020-04-11 ENCOUNTER — HOSPITAL ENCOUNTER (EMERGENCY)
Age: 50
Discharge: HOME | End: 2020-04-11
Attending: EMERGENCY MEDICINE | Admitting: EMERGENCY MEDICINE
Payer: MEDICAID

## 2020-04-11 VITALS
RESPIRATION RATE: 18 BRPM | OXYGEN SATURATION: 94 % | DIASTOLIC BLOOD PRESSURE: 59 MMHG | HEART RATE: 74 BPM | TEMPERATURE: 98.2 F | SYSTOLIC BLOOD PRESSURE: 90 MMHG

## 2020-04-11 DIAGNOSIS — F41.1 ANXIETY STATE: Primary | ICD-10-CM

## 2020-04-11 LAB
ALBUMIN SERPL-MCNC: 3.7 G/DL (ref 3.5–5)
ALBUMIN/GLOB SERPL: 0.8 {RATIO} (ref 1.1–2.2)
ALP SERPL-CCNC: 133 U/L (ref 45–117)
ALT SERPL-CCNC: 173 U/L (ref 12–78)
AMPHET UR QL SCN: NEGATIVE
ANION GAP SERPL CALC-SCNC: 4 MMOL/L (ref 5–15)
AST SERPL-CCNC: 117 U/L (ref 15–37)
BARBITURATES UR QL SCN: NEGATIVE
BASOPHILS # BLD: 0 K/UL (ref 0–0.1)
BASOPHILS NFR BLD: 1 % (ref 0–1)
BENZODIAZ UR QL: NEGATIVE
BILIRUB SERPL-MCNC: 0.6 MG/DL (ref 0.2–1)
BUN SERPL-MCNC: 15 MG/DL (ref 6–20)
BUN/CREAT SERPL: 15 (ref 12–20)
CALCIUM SERPL-MCNC: 9.2 MG/DL (ref 8.5–10.1)
CANNABINOIDS UR QL SCN: NEGATIVE
CHLORIDE SERPL-SCNC: 103 MMOL/L (ref 97–108)
CO2 SERPL-SCNC: 26 MMOL/L (ref 21–32)
COCAINE UR QL SCN: POSITIVE
COMMENT, HOLDF: NORMAL
CREAT SERPL-MCNC: 1 MG/DL (ref 0.55–1.02)
DIFFERENTIAL METHOD BLD: NORMAL
DRUG SCRN COMMENT,DRGCM: ABNORMAL
EOSINOPHIL # BLD: 0.1 K/UL (ref 0–0.4)
EOSINOPHIL NFR BLD: 2 % (ref 0–7)
ERYTHROCYTE [DISTWIDTH] IN BLOOD BY AUTOMATED COUNT: 12.6 % (ref 11.5–14.5)
ETHANOL SERPL-MCNC: <10 MG/DL
GLOBULIN SER CALC-MCNC: 4.7 G/DL (ref 2–4)
GLUCOSE SERPL-MCNC: 103 MG/DL (ref 65–100)
HCT VFR BLD AUTO: 42.3 % (ref 35–47)
HGB BLD-MCNC: 14.1 G/DL (ref 11.5–16)
IMM GRANULOCYTES # BLD AUTO: 0 K/UL (ref 0–0.04)
IMM GRANULOCYTES NFR BLD AUTO: 0 % (ref 0–0.5)
LYMPHOCYTES # BLD: 1.8 K/UL (ref 0.8–3.5)
LYMPHOCYTES NFR BLD: 31 % (ref 12–49)
MCH RBC QN AUTO: 31.7 PG (ref 26–34)
MCHC RBC AUTO-ENTMCNC: 33.3 G/DL (ref 30–36.5)
MCV RBC AUTO: 95.1 FL (ref 80–99)
METHADONE UR QL: POSITIVE
MONOCYTES # BLD: 0.6 K/UL (ref 0–1)
MONOCYTES NFR BLD: 10 % (ref 5–13)
NEUTS SEG # BLD: 3.3 K/UL (ref 1.8–8)
NEUTS SEG NFR BLD: 56 % (ref 32–75)
NRBC # BLD: 0 K/UL (ref 0–0.01)
NRBC BLD-RTO: 0 PER 100 WBC
OPIATES UR QL: NEGATIVE
PCP UR QL: NEGATIVE
PLATELET # BLD AUTO: 203 K/UL (ref 150–400)
PMV BLD AUTO: 10.6 FL (ref 8.9–12.9)
POTASSIUM SERPL-SCNC: 4.4 MMOL/L (ref 3.5–5.1)
PROT SERPL-MCNC: 8.4 G/DL (ref 6.4–8.2)
RBC # BLD AUTO: 4.45 M/UL (ref 3.8–5.2)
SAMPLES BEING HELD,HOLD: NORMAL
SODIUM SERPL-SCNC: 133 MMOL/L (ref 136–145)
WBC # BLD AUTO: 5.8 K/UL (ref 3.6–11)

## 2020-04-11 PROCEDURE — 71045 X-RAY EXAM CHEST 1 VIEW: CPT

## 2020-04-11 PROCEDURE — 80307 DRUG TEST PRSMV CHEM ANLYZR: CPT

## 2020-04-11 PROCEDURE — 36415 COLL VENOUS BLD VENIPUNCTURE: CPT

## 2020-04-11 PROCEDURE — 74011250637 HC RX REV CODE- 250/637: Performed by: PHYSICIAN ASSISTANT

## 2020-04-11 PROCEDURE — 99284 EMERGENCY DEPT VISIT MOD MDM: CPT

## 2020-04-11 PROCEDURE — 85025 COMPLETE CBC W/AUTO DIFF WBC: CPT

## 2020-04-11 PROCEDURE — 80053 COMPREHEN METABOLIC PANEL: CPT

## 2020-04-11 RX ORDER — LORAZEPAM 1 MG/1
1 TABLET ORAL
Status: COMPLETED | OUTPATIENT
Start: 2020-04-11 | End: 2020-04-11

## 2020-04-11 RX ADMIN — LORAZEPAM 1 MG: 1 TABLET ORAL at 21:25
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
## 2020-04-12 NOTE — DISCHARGE INSTRUCTIONS

## 2020-04-12 NOTE — ED PROVIDER NOTES
Pavan Jimenez is a 51 y.o. female  who presents by EMS  to ER with c/o Patient presents with: Anxiety  PAtient with complaints of SOB, dry mouth, and hot flashes. Patient also with anxiety x 1 week. Patient reports symptoms worsened tonight. Patient anxious over COVID concerns, denies any symptoms. Patient also denies any recent exposure or travel. Patient denies suicidal or homicidal ideation. Denies fever or chills. She specifically denies any fevers, chills, nausea, vomiting, chest pain, shortness of breath, headache, rash, diarrhea, abdominal pain, urinary/bowel changes, sweating or weight loss. PCP: None   PMHx significant for: Past Medical History:  No date: Hepatitis C infection  No date: Smoker   PSHx significant for: Past Surgical History:  No date: BREAST SURGERY PROCEDURE UNLISTED      Comment:  lumpectomy  No date: HX GYN      Comment:    Social Hx: Tobacco use: Social History    Tobacco Use      Smoking status: Current Every Day Smoker        Packs/day: 1.00      Smokeless tobacco: Never Used  ; EtOH use: The patient states she drinks 0 per week.; Illicit Drug use: Allergies:  No Known Allergies    There are no other complaints, changes or physical findings at this time. Past Medical History:   Diagnosis Date    Hepatitis C infection     Smoker        Past Surgical History:   Procedure Laterality Date    BREAST SURGERY PROCEDURE UNLISTED      lumpectomy    HX GYN               History reviewed. No pertinent family history.     Social History     Socioeconomic History    Marital status: SINGLE     Spouse name: Not on file    Number of children: Not on file    Years of education: Not on file    Highest education level: Not on file   Occupational History    Not on file   Social Needs    Financial resource strain: Not on file    Food insecurity     Worry: Not on file     Inability: Not on file    Transportation needs     Medical: Not on file Non-medical: Not on file   Tobacco Use    Smoking status: Current Every Day Smoker     Packs/day: 1.00    Smokeless tobacco: Never Used   Substance and Sexual Activity    Alcohol use: Yes     Frequency: Never     Comment: 1-2 beers per day    Drug use: Yes     Types: Prescription     Comment: pt is on methadone    Sexual activity: Not on file   Lifestyle    Physical activity     Days per week: Not on file     Minutes per session: Not on file    Stress: Not on file   Relationships    Social connections     Talks on phone: Not on file     Gets together: Not on file     Attends [employer] service: Not on file     Active member of club or organization: Not on file     Attends meetings of clubs or organizations: Not on file     Relationship status: Not on file    Intimate partner violence     Fear of current or ex partner: Not on file     Emotionally abused: Not on file     Physically abused: Not on file     Forced sexual activity: Not on file   Other Topics Concern    Not on file   Social History Narrative    Not on file         ALLERGIES: Patient has no known allergies. Review of Systems   Constitutional: Positive for diaphoresis. Negative for activity change, chills and fever. HENT: Negative for congestion, rhinorrhea and sore throat. Respiratory: Positive for shortness of breath. Cardiovascular: Negative for chest pain and leg swelling. Gastrointestinal: Negative for abdominal pain, diarrhea, nausea and vomiting. Genitourinary: Negative for dysuria, vaginal bleeding and vaginal discharge. Musculoskeletal: Negative for arthralgias and myalgias. Neurological: Negative for dizziness. Psychiatric/Behavioral: Negative for confusion. The patient is nervous/anxious. All other systems reviewed and are negative. Vitals:    04/11/20 2055   BP: 137/86   Pulse: 86   Resp: 20   Temp: 98.3 °F (36.8 °C)   SpO2: 97%            Physical Exam  Constitutional:       Appearance: Normal appearance. She is well-developed. HENT:      Head: Normocephalic and atraumatic. Right Ear: External ear normal.      Left Ear: External ear normal.      Nose: Nose normal.      Mouth/Throat:      Pharynx: No oropharyngeal exudate. Eyes:      General: Lids are normal.         Right eye: No discharge. Left eye: No discharge. Conjunctiva/sclera: Conjunctivae normal.   Neck:      Musculoskeletal: Normal range of motion. Thyroid: No thyromegaly. Trachea: No tracheal deviation. Cardiovascular:      Rate and Rhythm: Normal rate and regular rhythm. Heart sounds: Normal heart sounds. Pulmonary:      Effort: Pulmonary effort is normal.      Breath sounds: Normal breath sounds. Abdominal:      General: Bowel sounds are normal.      Palpations: Abdomen is soft. Musculoskeletal: Normal range of motion. Skin:     General: Skin is warm and dry. Neurological:      Mental Status: She is alert and oriented to person, place, and time. Psychiatric:         Mood and Affect: Mood is anxious. Thought Content: Thought content does not include homicidal or suicidal ideation. Thought content does not include homicidal or suicidal plan. Judgment: Judgment normal.          MDM  Number of Diagnoses or Management Options  Anxiety state:   Diagnosis management comments: Assesment/Plan- 51 y.o. Patient presents with: Anxiety  differential includes: anxiety, electrolyte abnormality. Labs and imaging reviewed with no acute findings. Patient is well appearing, afebrile and tolerating PO. Recommend PCP follow up. Patient educated on reasons to return to the ED.            Procedures

## 2020-04-12 NOTE — ED NOTES
Transportation for called [phone] for patient at discharge to go [facility] the [facility] ([facility] Confirmation [id number]. ETA within 3 hours. Patient given crackers, discharge papers, and escorted to waiting room to wait for ride. VSS prior to leaving department.

## 2021-11-05 NOTE — PROGRESS NOTES
[facility] FRACTURE PROGRESS NOTE    2019  Admit Date:   2019    Post Op day: 10 Days Post-Op    Subjective:    Vamsi Servin states that her finger is feeling ok. Pain controlled. Remains on IV Abx per ID. No new complaints. Continues to do ROM exercises on own.   Tolerating diet  Denies N/V/SOB or CP     PT/OT:   Gait:                    Vital Signs:    Patient Vitals for the past 8 hrs:   BP Temp Pulse Resp SpO2   19 0807 96/64 97.6 °F (36.4 °C) 68 16 96 %     Temp (24hrs), Av.7 °F (36.5 °C), Min:97.5 °F (36.4 °C), Max:97.9 °F (36.6 °C)      Pain Control:   Pain Assessment  Pain Scale 1: Numeric (0 - 10)  Pain Intensity 1: 0  Pain Location 1: Finger (comment which one)  Pain Orientation 1: Right  Pain Description 1: Throbbing  Pain Intervention(s) 1: Other (comment)(denies pain and/or pain management needs)    Meds:    Current Facility-Administered Medications   Medication Dose Route Frequency    methadone (DOLOPHINE) 5 mg/5 mL oral solution 60 mg  60 mg Oral DAILY    diphenhydrAMINE (BENADRYL) capsule 25 mg  25 mg Oral Q6H PRN    sodium chloride (NS) flush 10 mL  10 mL InterCATHeter PRN    alteplase (CATHFLO) 1 mg in sterile water (preservative free) 1 mL injection  1 mg InterCATHeter PRN    vancomycin (VANCOCIN) 1,000 mg in 0.9% sodium chloride (MBP/ADV) 250 mL  1,000 mg IntraVENous Q12H    heparin (porcine) injection 5,000 Units  5,000 Units SubCUTAneous Q12H    sodium chloride (NS) flush 5-40 mL  5-40 mL IntraVENous Q8H    sodium chloride (NS) flush 5-40 mL  5-40 mL IntraVENous PRN    ibuprofen (MOTRIN) tablet 400 mg  400 mg Oral TID    Vancomycin Pharmacy Dosing   Other PRN       LAB:    Recent Labs     19  0226   HCT 39.3   HGB 13.1       Transfuse PRBC's:      Assessment & Physician's Comment:  Right long finger with well healing wounds and sutures still in place  No drainage or erythema  Mild flexion contracture at this time and unable to fully flex finger but no pain with ROM  Dressing is clean, dry, and intact  Neurovascular checks within normal limits  Orientation:  Oriented    Principal Problem:    Cellulitis of hand (2/19/2019)    Active Problems:    Infection of hand (2/22/2019)        Plan:    Cont OT for finger ROM  Twice daily low bulk dressing changes to finger - allow patient to work on ROM when bandages off  Abx per ID team  Cont current pain management  Sutures to be removed Monday    Greg Quintana PA-C   Orthopedic Trauma Service  [address] [facility] 18

## 2022-03-19 PROBLEM — M00.9 SEPTIC JOINT OF LEFT KNEE JOINT (HCC): Status: ACTIVE | Noted: 2019-09-06

## 2022-03-19 PROBLEM — M00.9 SEPTIC ARTHRITIS (HCC): Status: ACTIVE | Noted: 2019-09-02

## 2022-03-19 PROBLEM — L08.9 INFECTION OF HAND: Status: ACTIVE | Noted: 2019-02-22

## 2022-03-19 PROBLEM — L03.119 CELLULITIS OF HAND: Status: ACTIVE | Noted: 2019-02-19

## 2022-03-20 PROBLEM — M00.9 PYOGENIC ARTHRITIS OF RIGHT SHOULDER REGION (HCC): Status: ACTIVE | Noted: 2019-09-02

## 2022-05-11 NOTE — PROGRESS NOTES
Bedside and Verbal shift change report given to Sameer Washington (oncoming nurse) by Jaci Gutiérrez (offgoing nurse). Report included the following information SBAR, Kardex, Intake/Output, MAR, Recent Results and Cardiac Rhythm Keyur-NSR. Family Medicine Office Visit    HISTORY OF PRESENT ILLNESS:   Pt is here today for a physical. She also reports that she had probable COVID in March (tested neg, but had classic symptoms), and since then has had persistent palpitations, feeling hot all the time. Per her fit bit, her resting heart rate is up into the 80s/90s, which is a marked elevation from before March. She feels hot all the time, feels like she's melting. It doesn't come and go like typical hot flashes.     PAST MEDICAL HISTORY:    RAD (reactive airway disease)                                 Diabetes mellitus (CMS/HCC)                                   PAST SURGICAL HISTORY:    NO PAST SURGERIES                                             ALLERGIES:   Allergen Reactions   • Augmentin [Amoclan] RASH   • Demerol    • Dilaudid [Hydromorphone Hcl]      Social History     Socioeconomic History   • Marital status: /Civil Union     Spouse name: Not on file   • Number of children: Not on file   • Years of education: Not on file   • Highest education level: Not on file   Occupational History   • Not on file   Tobacco Use   • Smoking status: Never Smoker   • Smokeless tobacco: Never Used   Substance and Sexual Activity   • Alcohol use: Not Currently     Alcohol/week: 0.0 standard drinks   • Drug use: No   • Sexual activity: Yes     Partners: Male     Birth control/protection: Pill   Other Topics Concern   • Not on file   Social History Narrative   • Not on file     Social Determinants of Health     Financial Resource Strain: Not on file   Food Insecurity: Not on file   Transportation Needs: Not on file   Physical Activity: Not on file   Stress: Not on file   Social Connections: Not on file   Intimate Partner Violence: Not on file     Family History   Problem Relation Age of Onset   • Cancer Mother    • Glaucoma Mother    • Diabetes Father      Current Outpatient Medications   Medication Sig Dispense Refill   • Ozempic, 1 MG/DOSE, 4 MG/3ML Solution  Pen-injector DIAL AND INJECT UNDER THE SKIN 1 MG WEEKLY 3 mL 0   • metFORMIN (GLUCOPHAGE-XR) 500 MG 24 hr tablet TAKE TWO TABLETS BY MOUTH TWICE A  tablet 0   • venlafaxine XR (EFFEXOR XR) 75 MG 24 hr capsule Take 1 capsule by mouth daily. 90 capsule 0   • Larissia 0.1-20 MG-MCG per tablet TAKE 1 TABLET BY MOUTH EVERY DAY 84 tablet 3   • Symbicort 160-4.5 MCG/ACT inhaler TAKE 2 PUFFS BY MOUTH TWICE A DAY 30.6 Inhaler 3   • albuterol 108 (90 Base) MCG/ACT inhaler INHALE 2 PUFFS BY MOUTH EVERY 4 HOURS AS NEEDED FOR WHEEZE 17 Inhaler 11   • MULTIPLE VITAMIN PO Take by mouth daily.     • diphenhydrAMINE (BENADRYL) 25 MG capsule Take 1 capsule by mouth 3 times daily as needed for Allergies. 15 capsule 0     No current facility-administered medications for this visit.              REVIEW OF SYSTEMS:  Constitutional: Positive for heat intolerance. Negative for fevers, chills  Ears, nose and mouth: Negative for earache, hearing changes, rhinorrhea, sore throat.  Respiratory: Negative for cough, shortness of breath, wheezing.  Cardiovascular: Positive for palpitations. Negative for chest pain, lower extremity edema.  Gastrointestinal: Negative for abdominal pain, nausea, vomiting, constipation, diarrhea.  Eyes: Negative for vision changes, irritation or redness.  Genitourinary: Negative for dysuria, hematuria, frequency  Skin: Negative for rashes, new lesions, changing moles.  Psychiatric: Negative for changes in mood or memory.    PHYSICAL EXAMINATION:  Visit Vitals  /85   Pulse 95   Resp 16   Ht 5' 2.5\" (1.588 m)   Wt 89 kg (196 lb 1.6 oz)   LMP 2022   SpO2 98%   BMI 35.30 kg/m²     General:  Well-developed, well-nourished, no acute distress.  Eyes: Normal lids, conjunctivae clear bilaterally.   ENT: External ears normal, mask covering nose and mouth  Neck: Soft, supple, symmetric. Normal thyroid, no palpable lesions.  Respiratory: Normal respiratory effort, lungs clear to auscultation  bilaterally  Cardiovascular: Regular rate and rhythm, no murmurs. No LE(lower extremity) edema.  Skin: Warm, no rashes or lesions. No palpable nodules.  Psychiatric: Alert and oriented times 3. Normal mood and affect.       ASSESSMENT AND PLAN:  1. Annual exam - labs ordered for screening. Immunizations deferred today. Otherwise up to date on health maintenance.  2. Type 2 diabetes - with hyperglycemia; Pt back on ozempic, recheck A1c in July  3. Palpitations, health intolerance - labs ordered as above. If neg, could be post-covid symptoms  4. Class 2 obesity - BMI 35.3 - would encourage activity as tolerated and a health diet  5. Major depression - stable, cont effexor    Follow up: PRN  In addition to her physical, 10 minutes spent in chart review, history and physical exam, discussion with patient, and documentation of acute concerns.    Suzie Mcfadden MD  5/11/2022

## 2022-07-09 PROCEDURE — 99284 EMERGENCY DEPT VISIT MOD MDM: CPT

## 2022-07-10 ENCOUNTER — HOSPITAL ENCOUNTER (EMERGENCY)
Age: 52
Discharge: HOME | End: 2022-07-10
Attending: EMERGENCY MEDICINE
Payer: MEDICAID

## 2022-07-10 VITALS
SYSTOLIC BLOOD PRESSURE: 109 MMHG | OXYGEN SATURATION: 98 % | RESPIRATION RATE: 18 BRPM | DIASTOLIC BLOOD PRESSURE: 69 MMHG | TEMPERATURE: 97.9 F | HEART RATE: 79 BPM

## 2022-07-10 DIAGNOSIS — F15.10 AMPHETAMINE ABUSE (HCC): ICD-10-CM

## 2022-07-10 DIAGNOSIS — F22 PARANOIA (PSYCHOSIS) (HCC): ICD-10-CM

## 2022-07-10 DIAGNOSIS — F19.950 SUBSTANCE-INDUCED PSYCHOTIC DISORDER WITH DELUSIONS (HCC): Primary | ICD-10-CM

## 2022-07-10 LAB
ALBUMIN SERPL-MCNC: 4.2 G/DL (ref 3.5–5)
ALBUMIN/GLOB SERPL: 0.9 {RATIO} (ref 1.1–2.2)
ALP SERPL-CCNC: 116 U/L (ref 45–117)
ALT SERPL-CCNC: 32 U/L (ref 12–78)
AMPHET UR QL SCN: POSITIVE
ANION GAP SERPL CALC-SCNC: 7 MMOL/L (ref 5–15)
APAP SERPL-MCNC: <2 UG/ML (ref 10–30)
APPEARANCE UR: CLEAR
AST SERPL-CCNC: 51 U/L (ref 15–37)
ATRIAL RATE: 66 BPM
BACTERIA URNS QL MICRO: NEGATIVE /HPF
BARBITURATES UR QL SCN: NEGATIVE
BASOPHILS # BLD: 0.1 K/UL (ref 0–0.1)
BASOPHILS NFR BLD: 1 % (ref 0–1)
BENZODIAZ UR QL: NEGATIVE
BILIRUB SERPL-MCNC: 0.6 MG/DL (ref 0.2–1)
BILIRUB UR QL: NEGATIVE
BUN SERPL-MCNC: 14 MG/DL (ref 6–20)
BUN/CREAT SERPL: 16 (ref 12–20)
CALCIUM SERPL-MCNC: 9.8 MG/DL (ref 8.5–10.1)
CALCULATED P AXIS, ECG09: 51 DEGREES
CALCULATED R AXIS, ECG10: 70 DEGREES
CALCULATED T AXIS, ECG11: 59 DEGREES
CANNABINOIDS UR QL SCN: NEGATIVE
CHLORIDE SERPL-SCNC: 102 MMOL/L (ref 97–108)
CO2 SERPL-SCNC: 25 MMOL/L (ref 21–32)
COCAINE UR QL SCN: NEGATIVE
COLOR UR: ABNORMAL
COMMENT, HOLDF: NORMAL
CREAT SERPL-MCNC: 0.86 MG/DL (ref 0.55–1.02)
DIAGNOSIS, 93000: NORMAL
DIFFERENTIAL METHOD BLD: NORMAL
DRUG SCRN COMMENT,DRGCM: ABNORMAL
EOSINOPHIL # BLD: 0.2 K/UL (ref 0–0.4)
EOSINOPHIL NFR BLD: 2 % (ref 0–7)
EPITH CASTS URNS QL MICRO: ABNORMAL /LPF
ERYTHROCYTE [DISTWIDTH] IN BLOOD BY AUTOMATED COUNT: 12.6 % (ref 11.5–14.5)
ETHANOL SERPL-MCNC: <10 MG/DL
GLOBULIN SER CALC-MCNC: 4.7 G/DL (ref 2–4)
GLUCOSE SERPL-MCNC: 102 MG/DL (ref 65–100)
GLUCOSE UR STRIP.AUTO-MCNC: NEGATIVE MG/DL
HCT VFR BLD AUTO: 36.4 % (ref 35–47)
HGB BLD-MCNC: 12.8 G/DL (ref 11.5–16)
HGB UR QL STRIP: NEGATIVE
HYALINE CASTS URNS QL MICRO: ABNORMAL /LPF (ref 0–5)
IMM GRANULOCYTES # BLD AUTO: 0 K/UL (ref 0–0.04)
IMM GRANULOCYTES NFR BLD AUTO: 0 % (ref 0–0.5)
KETONES UR QL STRIP.AUTO: ABNORMAL MG/DL
LEUKOCYTE ESTERASE UR QL STRIP.AUTO: NEGATIVE
LYMPHOCYTES # BLD: 2.5 K/UL (ref 0.8–3.5)
LYMPHOCYTES NFR BLD: 23 % (ref 12–49)
MAGNESIUM SERPL-MCNC: 2.4 MG/DL (ref 1.6–2.4)
MCH RBC QN AUTO: 31.2 PG (ref 26–34)
MCHC RBC AUTO-ENTMCNC: 35.2 G/DL (ref 30–36.5)
MCV RBC AUTO: 88.8 FL (ref 80–99)
METHADONE UR QL: NEGATIVE
MONOCYTES # BLD: 0.6 K/UL (ref 0–1)
MONOCYTES NFR BLD: 6 % (ref 5–13)
NEUTS SEG # BLD: 7.4 K/UL (ref 1.8–8)
NEUTS SEG NFR BLD: 68 % (ref 32–75)
NITRITE UR QL STRIP.AUTO: NEGATIVE
NRBC # BLD: 0 K/UL (ref 0–0.01)
NRBC BLD-RTO: 0 PER 100 WBC
OPIATES UR QL: NEGATIVE
P-R INTERVAL, ECG05: 154 MS
PCP UR QL: NEGATIVE
PH UR STRIP: 5.5 [PH] (ref 5–8)
PLATELET # BLD AUTO: 277 K/UL (ref 150–400)
PMV BLD AUTO: 11 FL (ref 8.9–12.9)
POTASSIUM SERPL-SCNC: 4.4 MMOL/L (ref 3.5–5.1)
PROT SERPL-MCNC: 8.9 G/DL (ref 6.4–8.2)
PROT UR STRIP-MCNC: NEGATIVE MG/DL
Q-T INTERVAL, ECG07: 412 MS
QRS DURATION, ECG06: 82 MS
QTC CALCULATION (BEZET), ECG08: 431 MS
RBC # BLD AUTO: 4.1 M/UL (ref 3.8–5.2)
RBC #/AREA URNS HPF: ABNORMAL /HPF (ref 0–5)
SALICYLATES SERPL-MCNC: 4.7 MG/DL (ref 2.8–20)
SAMPLES BEING HELD,HOLD: NORMAL
SODIUM SERPL-SCNC: 134 MMOL/L (ref 136–145)
SP GR UR REFRACTOMETRY: 1 (ref 1–1.03)
UR CULT HOLD, URHOLD: NORMAL
UROBILINOGEN UR QL STRIP.AUTO: 0.2 EU/DL (ref 0.2–1)
VENTRICULAR RATE, ECG03: 66 BPM
WBC # BLD AUTO: 10.9 K/UL (ref 3.6–11)
WBC URNS QL MICRO: ABNORMAL /HPF (ref 0–4)

## 2022-07-10 PROCEDURE — 80179 DRUG ASSAY SALICYLATE: CPT

## 2022-07-10 PROCEDURE — 74011250637 HC RX REV CODE- 250/637: Performed by: NURSE PRACTITIONER

## 2022-07-10 PROCEDURE — 80053 COMPREHEN METABOLIC PANEL: CPT

## 2022-07-10 PROCEDURE — 93005 ELECTROCARDIOGRAM TRACING: CPT

## 2022-07-10 PROCEDURE — 82077 ASSAY SPEC XCP UR&BREATH IA: CPT

## 2022-07-10 PROCEDURE — 80307 DRUG TEST PRSMV CHEM ANLYZR: CPT

## 2022-07-10 PROCEDURE — 85025 COMPLETE CBC W/AUTO DIFF WBC: CPT

## 2022-07-10 PROCEDURE — 81001 URINALYSIS AUTO W/SCOPE: CPT

## 2022-07-10 PROCEDURE — 80143 DRUG ASSAY ACETAMINOPHEN: CPT

## 2022-07-10 PROCEDURE — 36415 COLL VENOUS BLD VENIPUNCTURE: CPT

## 2022-07-10 PROCEDURE — 83735 ASSAY OF MAGNESIUM: CPT

## 2022-07-10 RX ORDER — LORAZEPAM 0.5 MG/1
1 TABLET ORAL
Status: COMPLETED | OUTPATIENT
Start: 2022-07-10 | End: 2022-07-10

## 2022-07-10 RX ADMIN — LORAZEPAM 1 MG: 0.5 TABLET ORAL at 01:28

## 2022-07-10 NOTE — ED TRIAGE NOTES
Pt arrives via ems from [facility]. on [address] [place]. EMS reports the  found the pt naked on the floor in the 's closet. EMS reports they called police department about the situation. EMS reports the patient reports \"people shoving stuff in her\" pt denies bleeding but endorses abdominal pain. EMS reports /71, HR 78, RR 18, . EMS reports pt sprayed peroxide multi-surface  on herself in attempt to clean herself. Pt reports \"feces was implanted in me. Not sure last night I think. Different people are indicating to me that it has happened. Its happened several times apparently\" Pt alert to self and time.

## 2022-07-10 NOTE — ED NOTES
MD reviewed discharge instructions and options with patient and patient verbalized understanding. RN reviewed discharge instructions using teachback method. Pt ambulated to exit without difficulty and in no signs of acute distress, and a friend  will drive home. No complaints or needs expressed at this time. Patient was counseled on medications prescribed at discharge. VSS, verbalized relief from most intense pain. Patient to call [facility] in the morning for appointment.

## 2022-07-10 NOTE — ED PROVIDER NOTES
50-year-old male with past medical history of hepatitis C and prior polysubstance abuse presents the ER today with the following story per nursing staff:    \"Pt arrives via ems from [facility] [address] on [address] Peterson Macario. EMS reports the  found the pt naked on the floor in the 's closet. EMS reports they called police department about the situation. EMS reports the patient reports \"people shoving stuff in her\" pt denies bleeding but endorses abdominal pain. EMS reports /71, HR 78, RR 18, . EMS reports pt sprayed peroxide multi-surface  on herself in attempt to clean herself. Pt reports \"feces was implanted in me. Not sure last night I think. Different people are indicating to me that it has happened. Its happened several times apparently\" Pt alert to self and time. \"    My spoke with patient, she appeared somewhat disheveled and in tears. She states that she feels like someone is out to get her and that random people \"from my past\" have been showing up and trying to harm her, including shopping feces into her bottom. Her thoughts are quite tangential and she also states that she is smelling gas and does not understand why people are following her. She reports last using heroin and cocaine about 5 months ago. She smokes cigarettes but does not drink alcohol. She states that she stopped using drugs \"because all the stuff was going on and I had no idea what it was so I need to get straight so that other people would believe me\". She denies any SI/HI or self mutilating behaviors. She reports feel little bit nauseous, but denies any other medical complaints. Past Medical History:   Diagnosis Date    Hepatitis C infection     Smoker        Past Surgical History:   Procedure Laterality Date    HX GYN          [place] BREAST SURGERY PROCEDURE UNLISTED      lumpectomy         History reviewed. No pertinent family history.     Social History Socioeconomic History    Marital status: SINGLE     Spouse name: Not on file    Number of children: Not on file    Years of education: Not on file    Highest education level: Not on file   Occupational History    Not on file   Tobacco Use    Smoking status: Current Every Day Smoker     Packs/day: 1.00    Smokeless tobacco: Never Used   Substance and Sexual Activity    Alcohol use: Yes     Comment: 1-2 beers per day    Drug use: Yes     Types: Prescription     Comment: pt is on methadone    Sexual activity: Not on file   Other Topics Concern    Not on file   Social History Narrative    Not on file     Social Determinants of Health     Financial Resource Strain:     Difficulty of Paying Living Expenses: Not on file   Food Insecurity:     Worried About Running Out of Food in the Last Year: Not on file    Karmen of Food in the Last Year: Not on file   Transportation Needs:     Lack of Transportation (Medical): Not on file    Lack of Transportation (Non-Medical):  Not on file   Physical Activity:     Days of Exercise per Week: Not on file    Minutes of Exercise per Session: Not on file   Stress:     Feeling of Stress : Not on file   Social Connections:     Frequency of Communication with Friends and Family: Not on file    Frequency of Social Gatherings with Friends and Family: Not on file    Attends Orthodox Services: Not on file    Active Member of [address] [place] [address] or Organizations: Not on file    Attends Club or Organization Meetings: Not on file    Marital Status: Not on file   Intimate Partner Violence:     Fear of Current or Ex-Partner: Not on file    Emotionally Abused: Not on file    Physically Abused: Not on file    Sexually Abused: Not on file   Housing Stability:     Unable to Pay for Housing in the Last Year: Not on file    Number of [place] in the Last Year: Not on file    Unstable Housing in the Last Year: Not on file         ALLERGIES: Patient has no known allergies. Review of Systems   Constitutional: Negative for fever. HENT: Negative for sore throat. Eyes: Negative for visual disturbance. Respiratory: Negative for shortness of breath. Cardiovascular: Negative for palpitations. Gastrointestinal: Positive for nausea. Negative for vomiting. Genitourinary: Negative for dysuria. Musculoskeletal: Negative for myalgias. Skin: Negative for rash. Neurological: Negative for dizziness. Psychiatric/Behavioral: Negative for dysphoric mood. Vitals:    07/09/22 2319   BP: 109/69   Pulse: 79   Resp: 18   Temp: 98.6 °F (37 °C)   SpO2: 98%            Physical Exam  Vitals and nursing note reviewed. Constitutional:       General: She is not in acute distress. Appearance: Normal appearance. She is not ill-appearing. HENT:      Head: Normocephalic and atraumatic. Right Ear: External ear normal.      Left Ear: External ear normal.      Nose: Nose normal.      Mouth/Throat:      Mouth: Mucous membranes are moist.      Pharynx: Oropharynx is clear. Eyes:      General: No scleral icterus. Extraocular Movements: Extraocular movements intact. Conjunctiva/sclera: Conjunctivae normal.      Pupils: Pupils are equal, round, and reactive to light. Cardiovascular:      Rate and Rhythm: Normal rate and regular rhythm. Pulses: Normal pulses. Heart sounds: Normal heart sounds. Pulmonary:      Effort: Pulmonary effort is normal.      Breath sounds: Normal breath sounds. Abdominal:      General: Abdomen is flat. Bowel sounds are normal. There is no distension. Palpations: Abdomen is soft. Tenderness: There is no abdominal tenderness. There is no right CVA tenderness or left CVA tenderness. Musculoskeletal:         General: Normal range of motion. Cervical back: Normal range of motion and neck supple. No rigidity. No muscular tenderness. Skin:     General: Skin is warm and dry. Coloration: Skin is not pale. Neurological:      General: No focal deficit present. Mental Status: She is alert and oriented to person, place, and time. Psychiatric:         Attention and Perception: She is inattentive. She perceives visual hallucinations. Mood and Affect: Mood is anxious. Affect is tearful. Speech: Speech is tangential.         Thought Content: Thought content is paranoid. Cognition and Memory: Cognition is impaired. MDM      VITAL SIGNS:  Patient Vitals for the past 4 hrs:   Temp Pulse Resp BP SpO2   07/10/22 0203 97.9 °F (36.6 °C) -- -- -- --   07/10/22 0100 -- -- -- -- 98 %   07/09/22 2319 98.6 °F (37 °C) 79 18 109/69 98 %         LABS:  Recent Results (from the past 6 hour(s))   CBC WITH AUTOMATED DIFF    Collection Time: 07/10/22  2:14 AM   Result Value Ref Range    WBC 10.9 3.6 - 11.0 K/uL    RBC 4.10 3.80 - 5.20 M/uL    HGB 12.8 11.5 - 16.0 g/dL    HCT 36.4 35.0 - 47.0 %    MCV 88.8 80.0 - 99.0 FL    MCH 31.2 26.0 - 34.0 PG    MCHC 35.2 30.0 - 36.5 g/dL    RDW 12.6 11.5 - 14.5 %    PLATELET 621 437 - 192 K/uL    MPV 11.0 8.9 - 12.9 FL    NRBC 0.0 0  WBC    ABSOLUTE NRBC 0.00 0.00 - 0.01 K/uL    NEUTROPHILS 68 32 - 75 %    LYMPHOCYTES 23 12 - 49 %    MONOCYTES 6 5 - 13 %    EOSINOPHILS 2 0 - 7 %    BASOPHILS 1 0 - 1 %    IMMATURE GRANULOCYTES 0 0.0 - 0.5 %    ABS. NEUTROPHILS 7.4 1.8 - 8.0 K/UL    ABS. LYMPHOCYTES 2.5 0.8 - 3.5 K/UL    ABS. MONOCYTES 0.6 0.0 - 1.0 K/UL    ABS. EOSINOPHILS 0.2 0.0 - 0.4 K/UL    ABS. BASOPHILS 0.1 0.0 - 0.1 K/UL    ABS. IMM.  GRANS. 0.0 0.00 - 0.04 K/UL    DF AUTOMATED     METABOLIC PANEL, COMPREHENSIVE    Collection Time: 07/10/22  2:14 AM   Result Value Ref Range    Sodium 134 (L) 136 - 145 mmol/L    Potassium 4.4 3.5 - 5.1 mmol/L    Chloride 102 97 - 108 mmol/L    CO2 25 21 - 32 mmol/L    Anion gap 7 5 - 15 mmol/L    Glucose 102 (H) 65 - 100 mg/dL    BUN 14 6 - 20 MG/DL    Creatinine 0.86 0.55 - 1.02 MG/DL    BUN/Creatinine ratio 16 12 - 20 GFR est AA >60 >60 ml/min/1.73m2    GFR est non-AA >60 >60 ml/min/1.73m2    Calcium 9.8 8.5 - 10.1 MG/DL    Bilirubin, total 0.6 0.2 - 1.0 MG/DL    ALT (SGPT) 32 12 - 78 U/L    AST (SGOT) 51 (H) 15 - 37 U/L    Alk. phosphatase 116 45 - 117 U/L    Protein, total 8.9 (H) 6.4 - 8.2 g/dL    Albumin 4.2 3.5 - 5.0 g/dL    Globulin 4.7 (H) 2.0 - 4.0 g/dL    A-G Ratio 0.9 (L) 1.1 - 2.2     MAGNESIUM    Collection Time: 07/10/22  2:14 AM   Result Value Ref Range    Magnesium 2.4 1.6 - 2.4 mg/dL   ETHYL ALCOHOL    Collection Time: 07/10/22  2:14 AM   Result Value Ref Range    ALCOHOL(ETHYL),SERUM <13 <48 MG/DL   SALICYLATE    Collection Time: 07/10/22  2:14 AM   Result Value Ref Range    Salicylate level 4.7 2.8 - 20.0 MG/DL   ACETAMINOPHEN    Collection Time: 07/10/22  2:14 AM   Result Value Ref Range    Acetaminophen level <2 (L) 10 - 30 ug/mL   SAMPLES BEING HELD    Collection Time: 07/10/22  2:14 AM   Result Value Ref Range    SAMPLES BEING HELD 1BLUE     COMMENT        Add-on orders for these samples will be processed based on acceptable specimen integrity and analyte stability, which may vary by analyte. IMAGING:  No orders to display         Medications During Visit:  Medications   LORazepam (ATIVAN) tablet 1 mg (1 mg Oral Given 7/10/22 0128)         DECISION MAKING:  Naomy Joy is a 49 y.o. female who comes in as above. Patient presents acutely psychotic and paranoid, without clear etiology. Her physical exam is mostly unremarkable, but does reveal extensive track marks consistent with IVDU, which she states she has not done in approximately 5 months. Medical work-up in process. There is certainly a chance that her psychosis and paranoia is drug-induced and toxicology screen is pending. She will be evaluated by the behavioral health team and will dispo once pending tests have resulted. Handoff given to Dr. Rawleigh Burak at 0300      IMPRESSION:  1.  Psychosis, unspecified psychosis type ([facility] 2. Paranoia (psychosis) ([facility]        DISPOSITION:        There are no discharge medications for this patient. Follow-up Information     Follow up With Specialties Details Why [address] [address]  [phone]            The patient is asked to follow-up with their primary care provider in the next several days. They are to call tomorrow for an appointment. The patient is asked to return promptly for any increased concerns or worsening of symptoms. They can return to this emergency department or any other emergency department.       Procedures

## 2022-07-10 NOTE — BSMART NOTE
Comprehensive Assessment Form Part 1      Section I - Disposition    Axis I - ***   Axis II - ***  Axis III - ***  Axis IV - ***  Axis V - ***      The Medical Doctor to Psychiatrist conference {WAS/NOT:15240::\"was\"} completed. The Medical Doctor is in agreement with Psychiatrist disposition because of (reason) ***. The plan is ***. The on-call Psychiatrist consulted was Dr. Sukhi Paige The admitting Psychiatrist will be Dr. Sukhi Paige The admitting Diagnosis is ***. The Payor source is ***. The name of the representative was ***. This was {[facility] APPROVED/NOT APPROVED:24519}. Section II - Integrated Summary    \"Pt arrives via ems from [facility] [address] on [address] Ortega. EMS reports the  found the pt naked on the floor in the 's closet. EMS reports they called police department about the situation. EMS reports the patient reports \"people shoving stuff in her\" pt denies bleeding but endorses abdominal pain. EMS reports /71, HR 78, RR 18, . EMS reports pt sprayed peroxide multi-surface  on herself in attempt to clean herself. Pt reports \"feces was implanted in me. Not sure last night I think. Different people are indicating to me that it has happened. Its happened several times apparently\" Pt alert to self and time. \"     Pt is a 50 y/o female transported to the ED via EMS for c/c summarized in triage note above. Pt has a history of IV substance abuse and was admitted here for sepsis in 2019. Pt reportedly told the ED provider that she worries people are \"out to get her\" and \"all this crazy stuff has been happening\" for several months. Pt reportedly acknowledged her SA hx but stated that she has abstained from all illicit substances for the past 5 months. The patient has demonstrated mental capacity to provide informed consent. The information is given by the patient. The Chief Complaint is ***. The Precipitant Factors are ***.   Previous Hospitalizations: ***  The patient {HAS BEEN IN RESTRAINTS:19228}  Current Psychiatrist and/or  is ***. Lethality Assessment:    The potential for suicide noted by the following: {Suicide Potential Choices:21512} . The potential for homicide is {NOTED:19212}. The patient {HAS/NOT:48411} been a perpetrator of sexual or physical abuse. There {ARE/ARE NOT PENDING CHARGES:29819}  The patient {IS/IS NOT:38784} felt to be at risk for self harm or harm to others. The attending nurse was advised {BSHSI HARM TO SELF OR OTHERS:48379}. Section III - Psychosocial  The patient's overall mood and attitude is ***. Feelings of helplessness and hopelessness are {OBSERVED/NOT OBSERVED:61806}. Generalized anxiety is {OBSERVED/NOT OBSERVED:33146}. Panic is {OBSERVED/NOT OBSERVED:83297}. Phobias are {OBSERVED/NOT OBSERVED:01674}. Obsessive compulsive tendencies are {OBSERVED/NOT OBSERVED:94167}. Section IV - Mental Status Exam  The patient's appearance {APPEARANCE:36314}. The patient's behavior {BEHAVIOR:23064}. The patient is {ORIENTATION:81247::\"oriented to time, place, person and situation\"}. The patient's speech {SPEECH:10537}. The patient's mood {MOOD BH:22074}. The range of affect {RANGE OF OBYOLU:07755}. The patient's thought content demonstrates {THOUGHT CONTENT:68492}. The thought process {THOUGHT PROCESS:28245}. The patient's perception {PERCEPTION:86650}. The patient's memory {MEMORY BH:87676}. The patient's appetite {APPETITIE:24079}. The patient's sleep {SLEEP BH:50871}. {DCSSYRC:83523}. The patient's judgement {JUDGEMENT:37997}. Section V - Substance Abuse  The patient {IS/NOT:19568} using substances. The patient is using {SUBSTANCE TYPE:79953}. The patient has experienced the following withdrawal symptoms: {SUBSTANCE ABUSE SYMPTOMS:48783}. Section VI - Living Arrangements  The patient {MARITIAL STATUS:49211}. The patient lives {CAREGIVER:97486}. The patient has {CHILDREN:08117}.   The patient {DOES/DOES NOT/WILD CARD (D):76087} plan to return home upon discharge. The patient {DOES/DOES NOT/WILD CARD (D):68345} have legal issues pending. The patient's source of income comes from [facility] SOURCE:}. Christian and cultural practices {CULTURAL/Confucianism PRACTICES:78386}    The patient's greatest support comes from *** and this person {WILL/WILL NOT:43564} be involved with the treatment. The patient {HAS/HAS NOT:22599108} been in an event described as horrible or outside the realm of ordinary life experience either currently or in the past.  The patient {HAS/NOT:21692} been a victim of sexual/physical abuse. Section VII - Other Areas of Clinical Concern  The highest grade achieved is *** with the overall quality of school experience being described as ***. The patient is currently {EMPLOYED:93690} and speaks {LANGUAGE:76133} as a primary language. The patient has {IMPAIRED COMMUNICATION:95630} affecting communication. The patient's preference for learning can be described as: {Learning assessment:09045}. The patient's hearing is { HEARIN}. The patient's vision is { VISION:05447}.       Sujata Valerio

## 2022-07-28 ENCOUNTER — HOSPITAL ENCOUNTER (EMERGENCY)
Age: 52
Discharge: PSYCHIATRIC OR SUBSTANCE USE FACILITY | End: 2022-07-29
Attending: EMERGENCY MEDICINE | Admitting: EMERGENCY MEDICINE
Payer: MEDICAID

## 2022-07-28 DIAGNOSIS — F32.A DEPRESSION, UNSPECIFIED DEPRESSION TYPE: Primary | ICD-10-CM

## 2022-07-28 DIAGNOSIS — R45.851 SUICIDAL IDEATION: ICD-10-CM

## 2022-07-28 LAB
ALBUMIN SERPL-MCNC: 3.9 G/DL (ref 3.5–5)
ALBUMIN/GLOB SERPL: 1.1 {RATIO} (ref 1.1–2.2)
ALP SERPL-CCNC: 105 U/L (ref 45–117)
ALT SERPL-CCNC: 25 U/L (ref 12–78)
AMPHET UR QL SCN: NEGATIVE
ANION GAP SERPL CALC-SCNC: 6 MMOL/L (ref 5–15)
APAP SERPL-MCNC: <2 UG/ML (ref 10–30)
APPEARANCE UR: ABNORMAL
AST SERPL-CCNC: 17 U/L (ref 15–37)
BACTERIA URNS QL MICRO: ABNORMAL /HPF
BARBITURATES UR QL SCN: NEGATIVE
BASOPHILS # BLD: 0 K/UL (ref 0–0.1)
BASOPHILS NFR BLD: 0 % (ref 0–1)
BENZODIAZ UR QL: NEGATIVE
BILIRUB SERPL-MCNC: 0.5 MG/DL (ref 0.2–1)
BILIRUB UR QL: NEGATIVE
BUN SERPL-MCNC: 22 MG/DL (ref 6–20)
BUN/CREAT SERPL: 25 (ref 12–20)
CALCIUM SERPL-MCNC: 9.4 MG/DL (ref 8.5–10.1)
CANNABINOIDS UR QL SCN: NEGATIVE
CHLORIDE SERPL-SCNC: 104 MMOL/L (ref 97–108)
CO2 SERPL-SCNC: 27 MMOL/L (ref 21–32)
COCAINE UR QL SCN: NEGATIVE
COLOR UR: ABNORMAL
CREAT SERPL-MCNC: 0.88 MG/DL (ref 0.55–1.02)
DIFFERENTIAL METHOD BLD: NORMAL
DRUG SCRN COMMENT,DRGCM: NORMAL
EOSINOPHIL # BLD: 0.1 K/UL (ref 0–0.4)
EOSINOPHIL NFR BLD: 2 % (ref 0–7)
EPITH CASTS URNS QL MICRO: ABNORMAL /LPF
ERYTHROCYTE [DISTWIDTH] IN BLOOD BY AUTOMATED COUNT: 12.6 % (ref 11.5–14.5)
ETHANOL SERPL-MCNC: <10 MG/DL
FLUAV RNA SPEC QL NAA+PROBE: NOT DETECTED
FLUBV RNA SPEC QL NAA+PROBE: NOT DETECTED
GLOBULIN SER CALC-MCNC: 3.6 G/DL (ref 2–4)
GLUCOSE SERPL-MCNC: 103 MG/DL (ref 65–100)
GLUCOSE UR STRIP.AUTO-MCNC: NEGATIVE MG/DL
HCT VFR BLD AUTO: 37.2 % (ref 35–47)
HGB BLD-MCNC: 13.1 G/DL (ref 11.5–16)
HGB UR QL STRIP: ABNORMAL
HYALINE CASTS URNS QL MICRO: ABNORMAL /LPF (ref 0–5)
IMM GRANULOCYTES # BLD AUTO: 0 K/UL (ref 0–0.04)
IMM GRANULOCYTES NFR BLD AUTO: 0 % (ref 0–0.5)
KETONES UR QL STRIP.AUTO: NEGATIVE MG/DL
LEUKOCYTE ESTERASE UR QL STRIP.AUTO: ABNORMAL
LYMPHOCYTES # BLD: 2.5 K/UL (ref 0.8–3.5)
LYMPHOCYTES NFR BLD: 39 % (ref 12–49)
MCH RBC QN AUTO: 30.7 PG (ref 26–34)
MCHC RBC AUTO-ENTMCNC: 35.2 G/DL (ref 30–36.5)
MCV RBC AUTO: 87.1 FL (ref 80–99)
METHADONE UR QL: NEGATIVE
MONOCYTES # BLD: 0.7 K/UL (ref 0–1)
MONOCYTES NFR BLD: 10 % (ref 5–13)
NEUTS SEG # BLD: 3.1 K/UL (ref 1.8–8)
NEUTS SEG NFR BLD: 49 % (ref 32–75)
NITRITE UR QL STRIP.AUTO: NEGATIVE
NRBC # BLD: 0 K/UL (ref 0–0.01)
NRBC BLD-RTO: 0 PER 100 WBC
OPIATES UR QL: NEGATIVE
PCP UR QL: NEGATIVE
PH UR STRIP: 6 [PH] (ref 5–8)
PLATELET # BLD AUTO: 210 K/UL (ref 150–400)
PMV BLD AUTO: 10.7 FL (ref 8.9–12.9)
POTASSIUM SERPL-SCNC: 3.4 MMOL/L (ref 3.5–5.1)
PROT SERPL-MCNC: 7.5 G/DL (ref 6.4–8.2)
PROT UR STRIP-MCNC: NEGATIVE MG/DL
RBC # BLD AUTO: 4.27 M/UL (ref 3.8–5.2)
RBC #/AREA URNS HPF: ABNORMAL /HPF (ref 0–5)
SALICYLATES SERPL-MCNC: 4.8 MG/DL (ref 2.8–20)
SARS-COV-2, COV2: NOT DETECTED
SODIUM SERPL-SCNC: 137 MMOL/L (ref 136–145)
SP GR UR REFRACTOMETRY: 1.01 (ref 1–1.03)
UR CULT HOLD, URHOLD: NORMAL
UROBILINOGEN UR QL STRIP.AUTO: 1 EU/DL (ref 0.2–1)
WBC # BLD AUTO: 6.4 K/UL (ref 3.6–11)
WBC URNS QL MICRO: ABNORMAL /HPF (ref 0–4)

## 2022-07-28 PROCEDURE — 87636 SARSCOV2 & INF A&B AMP PRB: CPT

## 2022-07-28 PROCEDURE — 90791 PSYCH DIAGNOSTIC EVALUATION: CPT

## 2022-07-28 PROCEDURE — 85025 COMPLETE CBC W/AUTO DIFF WBC: CPT

## 2022-07-28 PROCEDURE — 80179 DRUG ASSAY SALICYLATE: CPT

## 2022-07-28 PROCEDURE — 80053 COMPREHEN METABOLIC PANEL: CPT

## 2022-07-28 PROCEDURE — 80307 DRUG TEST PRSMV CHEM ANLYZR: CPT

## 2022-07-28 PROCEDURE — 82077 ASSAY SPEC XCP UR&BREATH IA: CPT

## 2022-07-28 PROCEDURE — 80143 DRUG ASSAY ACETAMINOPHEN: CPT

## 2022-07-28 PROCEDURE — 99285 EMERGENCY DEPT VISIT HI MDM: CPT

## 2022-07-28 PROCEDURE — 81001 URINALYSIS AUTO W/SCOPE: CPT

## 2022-07-28 PROCEDURE — 36415 COLL VENOUS BLD VENIPUNCTURE: CPT

## 2022-07-28 NOTE — ED TRIAGE NOTES
Pt arrives tearful in triage for c/o of \"having a breakdown\". Pt appears paranoid, stating that after a therapy session she heard a commercial about the exact thing she was talking about, and knows everything is recorded and watched. Pt states she is having thoughts of harming herself, and has cut her wrists and taken pills in the past. When asked if she wants to hurt anyone, she stated \"just the people who are doing this to me\", but doesn't that who they are.

## 2022-07-29 ENCOUNTER — HOSPITAL ENCOUNTER (INPATIENT)
Age: 52
LOS: 7 days | Discharge: HOME | DRG: 751 | End: 2022-08-05
Attending: PSYCHIATRY & NEUROLOGY | Admitting: PSYCHIATRY & NEUROLOGY
Payer: MEDICAID

## 2022-07-29 VITALS
WEIGHT: 129.63 LBS | DIASTOLIC BLOOD PRESSURE: 60 MMHG | BODY MASS INDEX: 22.13 KG/M2 | TEMPERATURE: 97.6 F | RESPIRATION RATE: 15 BRPM | SYSTOLIC BLOOD PRESSURE: 97 MMHG | HEIGHT: 64 IN | OXYGEN SATURATION: 97 % | HEART RATE: 51 BPM

## 2022-07-29 PROBLEM — F29 UNSPECIFIED PSYCHOSIS NOT DUE TO A SUBSTANCE OR KNOWN PHYSIOLOGICAL CONDITION (HCC): Status: ACTIVE | Noted: 2022-07-29

## 2022-07-29 PROCEDURE — 65220000003 HC RM SEMIPRIVATE PSYCH

## 2022-07-29 PROCEDURE — 74011250637 HC RX REV CODE- 250/637: Performed by: PSYCHIATRY & NEUROLOGY

## 2022-07-29 RX ORDER — ADHESIVE BANDAGE
30 BANDAGE TOPICAL DAILY PRN
Status: DISCONTINUED | OUTPATIENT
Start: 2022-07-29 | End: 2022-08-05 | Stop reason: HOSPADM

## 2022-07-29 RX ORDER — BUPRENORPHINE AND NALOXONE 8; 2 MG/1; MG/1
1 FILM, SOLUBLE BUCCAL; SUBLINGUAL DAILY
Status: ON HOLD | COMMUNITY
End: 2022-07-29

## 2022-07-29 RX ORDER — LORAZEPAM 2 MG/ML
1 INJECTION INTRAMUSCULAR
Status: DISCONTINUED | OUTPATIENT
Start: 2022-07-29 | End: 2022-07-29

## 2022-07-29 RX ORDER — MIDAZOLAM HYDROCHLORIDE 1 MG/ML
2 INJECTION, SOLUTION INTRAMUSCULAR; INTRAVENOUS
Status: DISCONTINUED | OUTPATIENT
Start: 2022-07-29 | End: 2022-08-05 | Stop reason: HOSPADM

## 2022-07-29 RX ORDER — ACETAMINOPHEN 325 MG/1
650 TABLET ORAL
Status: DISCONTINUED | OUTPATIENT
Start: 2022-07-29 | End: 2022-08-05 | Stop reason: HOSPADM

## 2022-07-29 RX ORDER — BUPRENORPHINE AND NALOXONE 8; 2 MG/1; MG/1
1 FILM, SOLUBLE BUCCAL; SUBLINGUAL DAILY
COMMUNITY

## 2022-07-29 RX ORDER — BUPRENORPHINE HYDROCHLORIDE AND NALOXONE HYDROCHLORIDE DIHYDRATE 8; 2 MG/1; MG/1
TABLET SUBLINGUAL DAILY
Status: ON HOLD | COMMUNITY
End: 2022-07-29

## 2022-07-29 RX ORDER — HALOPERIDOL 5 MG/ML
5 INJECTION INTRAMUSCULAR
Status: DISCONTINUED | OUTPATIENT
Start: 2022-07-29 | End: 2022-08-05 | Stop reason: HOSPADM

## 2022-07-29 RX ORDER — BUPRENORPHINE AND NALOXONE 8; 2 MG/1; MG/1
1 FILM, SOLUBLE BUCCAL; SUBLINGUAL DAILY
Status: DISCONTINUED | OUTPATIENT
Start: 2022-07-30 | End: 2022-08-05 | Stop reason: HOSPADM

## 2022-07-29 RX ORDER — HYDROXYZINE 25 MG/1
50 TABLET, FILM COATED ORAL
Status: DISCONTINUED | OUTPATIENT
Start: 2022-07-29 | End: 2022-08-05 | Stop reason: HOSPADM

## 2022-07-29 RX ORDER — TRAZODONE HYDROCHLORIDE 50 MG/1
50 TABLET ORAL
Status: DISCONTINUED | OUTPATIENT
Start: 2022-07-29 | End: 2022-08-05 | Stop reason: HOSPADM

## 2022-07-29 RX ORDER — DIPHENHYDRAMINE HYDROCHLORIDE 50 MG/ML
50 INJECTION, SOLUTION INTRAMUSCULAR; INTRAVENOUS
Status: DISCONTINUED | OUTPATIENT
Start: 2022-07-29 | End: 2022-08-05 | Stop reason: HOSPADM

## 2022-07-29 RX ORDER — OLANZAPINE 5 MG/1
5 TABLET ORAL
Status: DISCONTINUED | OUTPATIENT
Start: 2022-07-29 | End: 2022-08-05 | Stop reason: HOSPADM

## 2022-07-29 RX ORDER — BENZTROPINE MESYLATE 1 MG/1
1 TABLET ORAL
Status: DISCONTINUED | OUTPATIENT
Start: 2022-07-29 | End: 2022-08-05 | Stop reason: HOSPADM

## 2022-07-29 RX ADMIN — TRAZODONE HYDROCHLORIDE 50 MG: 50 TABLET ORAL at 21:38

## 2022-07-29 RX ADMIN — HYDROXYZINE HYDROCHLORIDE 50 MG: 25 TABLET ORAL at 21:38

## 2022-07-29 NOTE — ED PROVIDER NOTES
81-year-old female with history of depression, suicide attempt presents with complaints of suicidal thoughts. Patient reports that she has cut her wrists in the past and overdosed on medicine. Denies any self-harm today. Denies any recent illness, fever, chills, nausea, vomiting, chest pain, shortness of breath. Patient is not currently being treated for her depression and does not see a psychiatrist or therapist at this time. Regular tobacco use, regular alcohol use, regular methadone use       Past Medical History:   Diagnosis Date    Hepatitis C infection     Smoker        Past Surgical History:   Procedure Laterality Date    HX GYN          [place] BREAST SURGERY PROCEDURE UNLISTED      lumpectomy         No family history on file. Social History     Socioeconomic History    Marital status: SINGLE     Spouse name: Not on file    Number of children: Not on file    Years of education: Not on file    Highest education level: Not on file   Occupational History    Not on file   Tobacco Use    Smoking status: Every Day     Packs/day: 1.00     Types: Cigarettes    Smokeless tobacco: Never   Substance and Sexual Activity    Alcohol use: Yes     Comment: 1-2 beers per day    Drug use: Yes     Types: Prescription     Comment: pt is on methadone    Sexual activity: Not on file   Other Topics Concern    Not on file   Social History Narrative    Not on file     Social Determinants of Health     Financial Resource Strain: Not on file   Food Insecurity: Not on file   Transportation Needs: Not on file   Physical Activity: Not on file   Stress: Not on file   Social Connections: Not on file   Intimate Partner Violence: Not on file   Housing Stability: Not on file         ALLERGIES: Patient has no known allergies. Review of Systems   Constitutional:  Negative for chills and fever. HENT:  Negative for congestion, nosebleeds and rhinorrhea. Eyes:  Negative for pain and redness.    Respiratory:  Negative for cough and shortness of breath. Cardiovascular:  Negative for chest pain and palpitations. Gastrointestinal:  Negative for abdominal pain, nausea and vomiting. Genitourinary:  Negative for dysuria, frequency, vaginal bleeding and vaginal pain. Musculoskeletal:  Negative for myalgias. Skin:  Negative for rash and wound. Neurological:  Negative for seizures, syncope and weakness. Hematological:  Does not bruise/bleed easily. Psychiatric/Behavioral:  Positive for dysphoric mood and suicidal ideas. Negative for agitation and confusion. The patient is not nervous/anxious. Vitals:    07/28/22 1920   BP: 124/87   Pulse: 78   Resp: 18   Temp: 98 °F (36.7 °C)   SpO2: 96%            Physical Exam  Vitals and nursing note reviewed. Constitutional:       Appearance: She is well-developed. HENT:      Head: Normocephalic and atraumatic. Eyes:      Pupils: Pupils are equal, round, and reactive to light. Neck:      Trachea: No tracheal deviation. Cardiovascular:      Rate and Rhythm: Normal rate and regular rhythm. Heart sounds: Normal heart sounds. Pulmonary:      Effort: Pulmonary effort is normal. No respiratory distress. Breath sounds: Normal breath sounds. No stridor. No wheezing or rales. Chest:      Chest wall: No tenderness. Abdominal:      General: Bowel sounds are normal. There is no distension. Palpations: Abdomen is soft. Tenderness: There is no abdominal tenderness. There is no rebound. Musculoskeletal:         General: No tenderness. Normal range of motion. Cervical back: Normal range of motion and neck supple. Skin:     General: Skin is warm and dry. Coloration: Skin is not pale. Findings: No rash. Neurological:      Mental Status: She is alert and oriented to person, place, and time. Cranial Nerves: No cranial nerve deficit. Psychiatric:         Thought Content: Thought content includes suicidal ideation.  Thought content does not include suicidal plan. MDM  Number of Diagnoses or Management Options  Depression, unspecified depression type  Suicidal ideation  Diagnosis management comments: 52-year-old female with history of depression presents with suicidal thoughts. Patient is well-appearing, afebrile, nontoxic, hemodynamically stable, no acute distress, no respiratory distress, clear to auscultation bilaterally. Plan be smart evaluation, labs for medical clearance and admission to psychiatric unit. Amount and/or Complexity of Data Reviewed  Clinical lab tests: ordered and reviewed           Procedures        9:45 PM  Change of shift. Care of patient signed over to Tristen Negron. Bedside handoff complete. Awaiting labs for medical clearance for psychiatric admission.

## 2022-07-29 NOTE — BSMART NOTE
Pt accepted to [facility] room 324 Bed 1 NP Vik / Dr. Ricarda Jimenez Br # [phone]    Writer provided update to Brittany Byrnes

## 2022-07-29 NOTE — PROGRESS NOTES
BSHSI: MED RECONCILIATION    Comments/Recommendations:   Suboxone prescription is prescribed by Syeda Corbett MD, and was filled at [address] [place] [address] on 7/20/22 for a 28-day supply. Medications added:   Suboxone    Information obtained from: Patient and RxQuery refill history    Allergies: Patient has no known allergies. Prior to Admission Medications:   Prior to Admission Medications   Prescriptions Last Dose Informant Patient Reported? Taking? buprenorphine-naloxone (Suboxone) 8-2 mg film sublingaul film  Self Yes Yes   Sig: Take 1 Film by SubLINGual route in the morning.  Indications: dependence on opioid-type drugs        Thank you,  Angy Lou, PharmD, BCPS  [phone]

## 2022-07-29 NOTE — BSMART NOTE
BSMART assessment completed, and suicide risk level noted to be Moderate Risk . Primary Nurse Holly Nazario and Physician Isael notified. Concerns not observed. Security/Off- has not been notified.

## 2022-07-29 NOTE — ED PROVIDER NOTES
ED Course as of 08/17/22 0926   u Jul 28, 2022 2157 9:58 PM  Change of shift. Care of patient taken over from Dr. Ana Lujan; H&P reviewed, bedside handoff complete. Awaiting Check labs. Bed search underway. [NY]      ED Course User Index  [NY] Dayna Ta MD      LABORATORY TESTS:  Recent Results (from the past 12 hour(s))   DRUG SCREEN, URINE    Collection Time: 07/28/22  7:54 PM   Result Value Ref Range    AMPHETAMINES Negative NEG      BARBITURATES Negative NEG      BENZODIAZEPINES Negative NEG      COCAINE Negative NEG      METHADONE Negative NEG      OPIATES Negative NEG      PCP(PHENCYCLIDINE) Negative NEG      THC (TH-CANNABINOL) Negative NEG      Drug screen comment (NOTE)    URINALYSIS W/MICROSCOPIC    Collection Time: 07/28/22  7:54 PM   Result Value Ref Range    Color YELLOW/STRAW      Appearance CLOUDY (A) CLEAR      Specific gravity 1.014 1.003 - 1.030      pH (UA) 6.0 5.0 - 8.0      Protein Negative NEG mg/dL    Glucose Negative NEG mg/dL    Ketone Negative NEG mg/dL    Bilirubin Negative NEG      Blood TRACE (A) NEG      Urobilinogen 1.0 0.2 - 1.0 EU/dL    Nitrites Negative NEG      Leukocyte Esterase SMALL (A) NEG      WBC 20-50 0 - 4 /hpf    RBC 0-5 0 - 5 /hpf    Epithelial cells MODERATE (A) FEW /lpf    Bacteria 2+ (A) NEG /hpf    Hyaline cast 0-2 0 - 5 /lpf   URINE CULTURE HOLD SAMPLE    Collection Time: 07/28/22  7:54 PM    Specimen: Serum; Urine   Result Value Ref Range    Urine culture hold        Urine on hold in Microbiology dept for 2 days. If unpreserved urine is submitted, it cannot be used for addtional testing after 24 hours, recollection will be required.    CBC WITH AUTOMATED DIFF    Collection Time: 07/28/22  9:26 PM   Result Value Ref Range    WBC 6.4 3.6 - 11.0 K/uL    RBC 4.27 3.80 - 5.20 M/uL    HGB 13.1 11.5 - 16.0 g/dL    HCT 37.2 35.0 - 47.0 %    MCV 87.1 80.0 - 99.0 FL    MCH 30.7 26.0 - 34.0 PG    MCHC 35.2 30.0 - 36.5 g/dL    RDW 12.6 11.5 - 14.5 %    PLATELET 960 095 - 400 K/uL    MPV 10.7 8.9 - 12.9 FL    NRBC 0.0 0  WBC    ABSOLUTE NRBC 0.00 0.00 - 0.01 K/uL    NEUTROPHILS 49 32 - 75 %    LYMPHOCYTES 39 12 - 49 %    MONOCYTES 10 5 - 13 %    EOSINOPHILS 2 0 - 7 %    BASOPHILS 0 0 - 1 %    IMMATURE GRANULOCYTES 0 0.0 - 0.5 %    ABS. NEUTROPHILS 3.1 1.8 - 8.0 K/UL    ABS. LYMPHOCYTES 2.5 0.8 - 3.5 K/UL    ABS. MONOCYTES 0.7 0.0 - 1.0 K/UL    ABS. EOSINOPHILS 0.1 0.0 - 0.4 K/UL    ABS. BASOPHILS 0.0 0.0 - 0.1 K/UL    ABS. IMM. GRANS. 0.0 0.00 - 0.04 K/UL    DF AUTOMATED     METABOLIC PANEL, COMPREHENSIVE    Collection Time: 07/28/22  9:26 PM   Result Value Ref Range    Sodium 137 136 - 145 mmol/L    Potassium 3.4 (L) 3.5 - 5.1 mmol/L    Chloride 104 97 - 108 mmol/L    CO2 27 21 - 32 mmol/L    Anion gap 6 5 - 15 mmol/L    Glucose 103 (H) 65 - 100 mg/dL    BUN 22 (H) 6 - 20 MG/DL    Creatinine 0.88 0.55 - 1.02 MG/DL    BUN/Creatinine ratio 25 (H) 12 - 20      GFR est AA >60 >60 ml/min/1.73m2    GFR est non-AA >60 >60 ml/min/1.73m2    Calcium 9.4 8.5 - 10.1 MG/DL    Bilirubin, total 0.5 0.2 - 1.0 MG/DL    ALT (SGPT) 25 12 - 78 U/L    AST (SGOT) 17 15 - 37 U/L    Alk.  phosphatase 105 45 - 117 U/L    Protein, total 7.5 6.4 - 8.2 g/dL    Albumin 3.9 3.5 - 5.0 g/dL    Globulin 3.6 2.0 - 4.0 g/dL    A-G Ratio 1.1 1.1 - 2.2     ACETAMINOPHEN    Collection Time: 07/28/22  9:26 PM   Result Value Ref Range    Acetaminophen level <2 (L) 10 - 30 ug/mL   SALICYLATE    Collection Time: 07/28/22  9:26 PM   Result Value Ref Range    Salicylate level 4.8 2.8 - 20.0 MG/DL   ETHYL ALCOHOL    Collection Time: 07/28/22  9:26 PM   Result Value Ref Range    ALCOHOL(ETHYL),SERUM <10 <10 MG/DL   COVID-19 WITH INFLUENZA A/B    Collection Time: 07/28/22  9:41 PM   Result Value Ref Range    SARS-CoV-2 by PCR Not detected NOTD      Influenza A by PCR Not detected NOTD      Influenza B by PCR Not detected NOTD         IMAGING RESULTS:   Labs Reviewed   METABOLIC PANEL, COMPREHENSIVE - Abnormal; Notable for the following components:       Result Value    Potassium 3.4 (*)     Glucose 103 (*)     BUN 22 (*)     BUN/Creatinine ratio 25 (*)     All other components within normal limits   ACETAMINOPHEN - Abnormal; Notable for the following components:    Acetaminophen level <2 (*)     All other components within normal limits   URINALYSIS W/MICROSCOPIC - Abnormal; Notable for the following components:    Appearance CLOUDY (*)     Blood TRACE (*)     Leukocyte Esterase SMALL (*)     Epithelial cells MODERATE (*)     Bacteria 2+ (*)     All other components within normal limits   URINE CULTURE HOLD SAMPLE   COVID-19 WITH INFLUENZA A/B   CBC WITH AUTOMATED DIFF   SALICYLATE   ETHYL ALCOHOL   DRUG SCREEN, URINE         MEDICATIONS GIVEN:  Medications - No data to display    IMPRESSION:  1. Depression, unspecified depression type    2.  Suicidal ideation        PLAN:  1. AWAITING PLACEMENT

## 2022-07-29 NOTE — ED NOTES
Pt changed into a green gown, belongings secured in the behavioral health closet and pt and belongings wanded by security.
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
## 2022-07-29 NOTE — BH NOTES
49year old, white female admitted to unit with primary diagnosis of suicidal ideations. Patient admitted from [facility] as a voluntarily patient. Patient is oriented x 4. Patient is admitted due to suicidal thoughts. UDS negative, BAL negative. Patient uncooperative and refused to answer admission questions. Patient walked out of the admission process room. Patient oriented to her room. Admission packet she refused to sign and receive her confidentiality code was not given to her. Q 15 minute checks initiated for safety.

## 2022-07-30 PROCEDURE — 65220000003 HC RM SEMIPRIVATE PSYCH

## 2022-07-30 PROCEDURE — 74011250637 HC RX REV CODE- 250/637: Performed by: PSYCHIATRY & NEUROLOGY

## 2022-07-30 RX ADMIN — TRAZODONE HYDROCHLORIDE 50 MG: 50 TABLET ORAL at 21:47

## 2022-07-30 RX ADMIN — HYDROXYZINE HYDROCHLORIDE 50 MG: 25 TABLET ORAL at 21:47

## 2022-07-30 RX ADMIN — OLANZAPINE 5 MG: 5 TABLET, FILM COATED ORAL at 21:47

## 2022-07-30 NOTE — PROGRESS NOTES
Patient alert, guarded, irritable. Denies SI/HI. Denies AVH. Denies anxiety and depression. Denies pain or discomfort. Patient's BP is 91/54, HR 67. Patient told writer, \"It's always low. I don't feel anything. I'm not dizzy or weak. \" Held Suboxone due to low BP. No distress observed. Meal compliant. Purposeful interaction ongoing. Problem: Falls - Risk of  Goal: *Absence of Falls  Description: Document Bernardo Medina Fall Risk and appropriate interventions in the flowsheet.   Outcome: Progressing Towards Goal  Note: Fall Risk Interventions:            Medication Interventions: Teach patient to arise slowly                   Problem: Suicide  Goal: *STG: Remains safe in hospital  Outcome: Progressing Towards Goal

## 2022-07-30 NOTE — PROGRESS NOTES
Jordon Guillen presented alert and oriented, flat affect, mood irritable. She was noted isolative to her bedroom. She received Atarax for anxiety and Trazodone for sleep. Pt appeared to have slept approx 10 hours during the night. No distress observed. Monitoring for safety and behaviors continues.

## 2022-07-30 NOTE — BH NOTES
CHIEF COMPLAINT:  \"Not good\"    INTERVAL HISTORY:  Candi Philippe states that she is not feeling well today. She denies SI/HI/AVH currently. She endorses decreased sleep. She presents as guarded and provides minimal answers to assessment questions. LABS:  No results found for this or any previous visit (from the past 24 hour(s)). VITALS:  Patient Vitals for the past 24 hrs:   Temp Pulse Resp BP SpO2   07/30/22 0946 97 °F (36.1 °C) 67 17 (!) 91/54 97 %   07/29/22 2008 98.2 °F (36.8 °C) 62 16 (!) 91/58 98 %      MEDICATIONS:    Current Facility-Administered Medications:     OLANZapine (ZyPREXA) tablet 5 mg, 5 mg, Oral, Q6H PRN, Bella Nuñez MD    haloperidol lactate (HALDOL) injection 5 mg, 5 mg, IntraMUSCular, Q6H PRN, Bella Nuñez MD    benztropine (COGENTIN) tablet 1 mg, 1 mg, Oral, BID PRN, Bella Nuñez MD    diphenhydrAMINE (BENADRYL) injection 50 mg, 50 mg, IntraMUSCular, BID PRN, Bella Nuñez MD    hydrOXYzine HCL (ATARAX) tablet 50 mg, 50 mg, Oral, TID PRN, Bella Nuñez MD, 50 mg at 07/29/22 2138    traZODone (DESYREL) tablet 50 mg, 50 mg, Oral, QHS PRN, Bella Nuñez MD, 50 mg at 07/29/22 2138    acetaminophen (TYLENOL) tablet 650 mg, 650 mg, Oral, Q4H PRN, Bella Nuñez MD    magnesium hydroxide (MILK OF MAGNESIA) 400 mg/5 mL oral suspension 30 mL, 30 mL, Oral, DAILY PRN, Bella Nuñez MD    midazolam (VERSED) injection 2 mg, 2 mg, IntraMUSCular, Q4H PRN, Bella Nuñez MD    buprenorphine-naloxone (SUBOXONE) 8mg-2mg SL film, 1 Film, SubLINGual, DAILY, Bella Nuñez MD     MSE:  Eye contact: poor  Grooming: fair  Psychomotor activity: decreased  Speech is spontaneous, quiet  Mood is irritable  Affect: constricted  Perception: Denies AVH  Suicidal ideation: denies  Cognition is grossly intact  Insight: poor  Judgment: poor        PHYSICAL EXAM:  Body habitus: Body mass index is 20.6 kg/m².   Musculoskeletal system: normal gait  Tremor - neg  Cog wheeling - neg    PLAN:  Continue current medications. Dispo planning per case management.

## 2022-07-31 PROCEDURE — 74011636637 HC RX REV CODE- 636/637: Performed by: PSYCHIATRY & NEUROLOGY

## 2022-07-31 PROCEDURE — 65220000003 HC RM SEMIPRIVATE PSYCH

## 2022-07-31 PROCEDURE — 74011250637 HC RX REV CODE- 250/637: Performed by: PSYCHIATRY & NEUROLOGY

## 2022-07-31 RX ADMIN — HYDROXYZINE HYDROCHLORIDE 50 MG: 25 TABLET ORAL at 21:41

## 2022-07-31 RX ADMIN — BUPRENORPHINE AND NALOXONE 1 FILM: 8; 2 FILM BUCCAL; SUBLINGUAL at 09:18

## 2022-07-31 NOTE — PROGRESS NOTES
Patient observed resting in bed during transition of care. Patient's affect flat/dull. Patient tearful, engaging minimally with staff. Patient reported no immediate concerns and displayed no obvious signs of medical or psychiatric distress. Patient reported symptoms of anxiety and depression 10/10. Patient denied hallucinations. Patient denied S/I or H/I. Patient was provided with PRN HS medications Trazodone, Atarax and Zyprexa at 2147. Patient observed quietly resting in bed throughout the night with no concerns. Staff will continue to assess and monitor.       Problem: Suicide  Goal: *STG: Remains safe in hospital  Outcome: Progressing Towards Goal  Goal: *STG: Seeks staff when feelings of self harm or harm towards others arise  Outcome: Progressing Towards Goal  Goal: *STG: Attends activities and groups  Outcome: Progressing Towards Goal

## 2022-07-31 NOTE — BH NOTES
CHIEF COMPLAINT:  \"Not good\"     INTERVAL HISTORY:  Cass Jimenes states that she is still not feeling well. She endorses SI, but denies plan/means/intent while on the unit. She endorses decreased sleep. She denies any side effects from the medications. She denies any questions or concerns at this time. LABS:  Recent Results   No results found for this or any previous visit (from the past 24 hour(s)).          VITALS:  Patient Vitals for the past 24 hrs:    Temp Pulse Resp BP SpO2   07/30/22 0946 97 °F (36.1 °C) 67 17 (!) 91/54 97 %   07/29/22 2008 98.2 °F (36.8 °C) 62 16 (!) 91/58 98 %      MEDICATIONS:     Current Facility-Administered Medications:    OLANZapine (ZyPREXA) tablet 5 mg, 5 mg, Oral, Q6H PRN, Trinidad Lopez MD    haloperidol lactate (HALDOL) injection 5 mg, 5 mg, IntraMUSCular, Q6H PRN, Trinidad Lopez MD    benztropine (COGENTIN) tablet 1 mg, 1 mg, Oral, BID PRN, Trinidad Lopez MD    diphenhydrAMINE (BENADRYL) injection 50 mg, 50 mg, IntraMUSCular, BID PRN, Trinidad Lopez MD    hydrOXYzine HCL (ATARAX) tablet 50 mg, 50 mg, Oral, TID PRN, Trinidad Lopez MD, 50 mg at 07/29/22 2138    traZODone (DESYREL) tablet 50 mg, 50 mg, Oral, QHS PRN, Trinidad Lopez MD, 50 mg at 07/29/22 2138    acetaminophen (TYLENOL) tablet 650 mg, 650 mg, Oral, Q4H PRN, Trinidad Lopez MD    magnesium hydroxide (MILK OF MAGNESIA) 400 mg/5 mL oral suspension 30 mL, 30 mL, Oral, DAILY PRN, Trinidad Lopez MD    midazolam (VERSED) injection 2 mg, 2 mg, IntraMUSCular, Q4H PRN, Trinidad Lopez MD    buprenorphine-naloxone (SUBOXONE) 8mg-2mg SL film, 1 Film, SubLINGual, DAILY, Trinidad Lopez MD      MSE:  Eye contact: poor  Grooming: fair  Psychomotor activity: decreased  Speech is spontaneous, quiet  Mood is irritable  Affect: constricted  Perception: Denies AVH  Suicidal ideation: denies  Cognition is grossly intact  Insight: poor  Judgment: poor        PHYSICAL EXAM:  Body habitus: Body mass index is 20.6 kg/m². Musculoskeletal system: normal gait  Tremor - neg  Cog wheeling - neg     PLAN:  Continue current medications. Dispo planning per case management.

## 2022-07-31 NOTE — PROGRESS NOTES
Received pt awake on bed. Alert and oriented x 4. On assessment pt endorsed anxiety and depression at 8/10. Denied SI, HI AVH and pain. Pt has flat and dull affect, Withdrawn to the room and sleepy most of th shift. Pt is compliant with med's and meals. Vital signs entered. No acute change in pt's condition. Contact precaution maintained. Pt is cooperative with plan of care. Will continue to monitor.   Problem: Suicide  Goal: *STG: Remains safe in hospital  Outcome: Progressing Towards Goal  Goal: *STG: Seeks staff when feelings of self harm or harm towards others arise  Outcome: Progressing Towards Goal  Goal: *STG/LTG: Complies with medication therapy  Outcome: Progressing Towards Goal

## 2022-08-01 PROCEDURE — 74011250637 HC RX REV CODE- 250/637: Performed by: PSYCHIATRY & NEUROLOGY

## 2022-08-01 PROCEDURE — 74011636637 HC RX REV CODE- 636/637: Performed by: PSYCHIATRY & NEUROLOGY

## 2022-08-01 PROCEDURE — 65220000003 HC RM SEMIPRIVATE PSYCH

## 2022-08-01 RX ORDER — ESCITALOPRAM OXALATE 10 MG/1
10 TABLET ORAL DAILY
Status: DISCONTINUED | OUTPATIENT
Start: 2022-08-02 | End: 2022-08-05 | Stop reason: HOSPADM

## 2022-08-01 RX ADMIN — BUPRENORPHINE AND NALOXONE 1 FILM: 8; 2 FILM BUCCAL; SUBLINGUAL at 09:02

## 2022-08-01 RX ADMIN — TRAZODONE HYDROCHLORIDE 50 MG: 50 TABLET ORAL at 21:22

## 2022-08-01 RX ADMIN — OLANZAPINE 5 MG: 5 TABLET, FILM COATED ORAL at 21:22

## 2022-08-01 RX ADMIN — HYDROXYZINE HYDROCHLORIDE 50 MG: 25 TABLET ORAL at 21:22

## 2022-08-01 NOTE — GROUP NOTE
AISHA  GROUP DOCUMENTATION INDIVIDUAL                                                                          Group Therapy Note    Date: 8/1/2022    Group Start Time: 0900  Group End Time: 1000  Group Topic: Topic Group    [facility]    Josue Olivares [address] GROUP    Group Therapy Note    Attendees: 6       Attendance: Did not attend            Travis Valadez

## 2022-08-01 NOTE — BH NOTES
CHIEF COMPLAINT:  \"Not good\"     INTERVAL HISTORY:  Lisa Buitrago states that she is still not feeling well. She endorses SI, but denies plan/means/intent while on the unit. She endorses decreased sleep. She denies any side effects from the medications. She denies any questions or concerns at this time. 8/1 - Jovana Canas  reports feeling very depressed and felt it was pointless to take medications due to people changing them when she leaves the hospital.  Notes being frustrated with the situation. Denies SI/HI/AH/VH. No aggression or violence. Appropriately interactive and aware. Tolerating medications well. Eating and sleeping fairly. LABS:  Recent Results   No results found for this or any previous visit (from the past 24 hour(s)).          VITALS:  Patient Vitals for the past 24 hrs:    Temp Pulse Resp BP SpO2   07/30/22 0946 97 °F (36.1 °C) 67 17 (!) 91/54 97 %   07/29/22 2008 98.2 °F (36.8 °C) 62 16 (!) 91/58 98 %      MEDICATIONS:     Current Facility-Administered Medications:    OLANZapine (ZyPREXA) tablet 5 mg, 5 mg, Oral, Q6H PRN, Elijah Stone MD    haloperidol lactate (HALDOL) injection 5 mg, 5 mg, IntraMUSCular, Q6H PRN, Elijah Stone MD    benztropine (COGENTIN) tablet 1 mg, 1 mg, Oral, BID PRN, Elijah Stone MD    diphenhydrAMINE (BENADRYL) injection 50 mg, 50 mg, IntraMUSCular, BID PRN, Elijah Stone MD    hydrOXYzine HCL (ATARAX) tablet 50 mg, 50 mg, Oral, TID PRN, Elijah Stone MD, 50 mg at 07/29/22 2138    traZODone (DESYREL) tablet 50 mg, 50 mg, Oral, QHS PRN, Elijah Stone MD, 50 mg at 07/29/22 2138    acetaminophen (TYLENOL) tablet 650 mg, 650 mg, Oral, Q4H PRN, Elijah Stone MD    magnesium hydroxide (MILK OF MAGNESIA) 400 mg/5 mL oral suspension 30 mL, 30 mL, Oral, DAILY PRN, Elijah Stone MD    midazolam (VERSED) injection 2 mg, 2 mg, IntraMUSCular, Q4H PRN, Elijah Stone MD    buprenorphine-naloxone (SUBOXONE) 8mg-2mg SL film, 1 Film, SubLINGual, DAILY, Trinidad Lopez MD      MSE:  Eye contact: poor  Grooming: fair  Psychomotor activity: decreased  Speech is spontaneous, quiet  Mood is irritable  Affect: constricted  Perception: Denies AVH  Suicidal ideation: denies  Cognition is grossly intact  Insight: poor  Judgment: poor        PHYSICAL EXAM:  Body habitus: Body mass index is 20.6 kg/m². Musculoskeletal system: normal gait  Tremor - neg  Cog wheeling - neg     PLAN:  Continue current medications. Restart lexapro 10 mg po Qdaily  Mrsa swab of nares  Dispo planning per case management.

## 2022-08-01 NOTE — PROGRESS NOTES
Patient received in the room alert and oriented X4, Patient affect is flat and sad, gait is steady, speech is clear pt is calm and cooperative with assessment, denied SI, HI, AVH, pain and depression. Patient endorsed anxiety at the level of 7/10, as needed atarax 50 mg administered as requested by patient. Contact precaution maintained. 0600: Patient observed asleep for 8.5 hours. Nursing rounds maintained.     Problem: Infection - Risk of, Multi-drug Resistant Organism Colonization (MDRO)  Goal: *Absence of infection signs and symptoms  Outcome: Progressing Towards Goal     Problem: Anxiety-Behavioral Health (Adult/Pediatric)  Goal: *STG: Seeks staff when feelings of anxiety and fear arise  Outcome: Progressing Towards Goal

## 2022-08-01 NOTE — GROUP NOTE
AISHA  GROUP DOCUMENTATION INDIVIDUAL                                                                          Group Therapy Note    Date: 8/1/2022    Group Start Time: 1500  Group End Time: 1600  Group Topic: Recreational/Music Therapy    [facility] - [place] [address] ACUTE BEHAV [facility]    Josue Olivares 5000 GROUP    Group Therapy Note    Attendees: 5       Attendance: Did not attend        Karma Jeffrey

## 2022-08-01 NOTE — BH NOTES
PSYCHOSOCIAL ASSESSMENT  :Patient identifying info:   Mateus Gage is a 49 y.o., female admitted 7/29/2022  9:44 AM     Presenting problem and precipitating factors: Patient is a 49year old  female with a hx of polysubstance abuse who voluntary went to [facility] for [facility] admission due to SI and paranoia. Mental status assessment: atient is alert and oriented x 4. Patient presents with a guarded mood and thought process and continues to isolate in room. Strengths/Recreation/Coping Skills: has her own apartment    Collateral information:     Current psychiatric /substance abuse providers and contact info:     Previous psychiatric/substance abuse providers and response to treatment: patient admitted to ER earlier this months due psychosis, but was discharged to follow up with Regina brady. Family history of mental illness or substance abuse: none reported     Substance abuse history: UDS- negative    Social History     Tobacco Use    Smoking status: Every Day     Packs/day: 1.00     Types: Cigarettes    Smokeless tobacco: Never   Substance Use Topics    Alcohol use: Yes     Comment: 1-2 beers per day       History of biomedical complications associated with substance abuse: denies     Patient's current acceptance of treatment or motivation for change: fair     Family constellation: patient is single, with no children and lives alone     Is significant other involved?      Describe support system:     Describe living arrangements and home environment: patient lives in her own apartment     GUARDIAN/POA: NO    Guardian Name:     Guardian Contact:     Health issues:   Hospital Problems  Date Reviewed: 9/7/2019            Codes Class Noted POA    Unspecified psychosis not due to a substance or known physiological condition [facility]) ICD-10-CM: F29  ICD-9-CM: 298.9  7/29/2022 Unknown           Trauma history:    Legal issues:     History of  service:     Financial status: Nondenominational/cultural factors:     Education/work history:     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

## 2022-08-01 NOTE — PROGRESS NOTES
Problem: Falls - Risk of  Goal: *Absence of Falls  Description: Document Emir Mathews Fall Risk and appropriate interventions in the flowsheet. Outcome: Progressing Towards Goal  Note: Fall Risk Interventions:            Medication Interventions: Teach patient to arise slowly                   Problem: Suicide  Goal: *STG: Remains safe in hospital  Outcome: Progressing Towards Goal     Problem: Infection - Risk of, Multi-drug Resistant Organism Colonization (MDRO)  Goal: *Absence of infection signs and symptoms  Outcome: Progressing Towards Goal     Problem: Anxiety-Behavioral Health (Adult/Pediatric)  Goal: *STG: Remains safe in hospital  Outcome: Progressing Towards Goal  Goal: *STG/LTG: Complies with medication therapy  Outcome: Progressing Towards Goal     Problem: Depressed Mood (Adult/Pediatric)  Goal: *STG: Remains safe in hospital  Outcome: Progressing Towards Goal       0730 Change of Shift Report received by Mitchell Zelaya (oncoming nurse) from [facility] 5116 (off going nurse) including SBAR, KARDEX, MAR, and recent results. 0800 Patient met in room. Patient observed to be in bed resting quietly. Patient calm and cooperative with assessment questions. Patient A/O x 4 and denies pain, SI, HI, and AVH. Patient endorses depression and anxiety but declines PRN anxiety medications. Patient presents with a flat affect. 0900 patient observed ambulating in the hallway with a steady gait and free of distress. Patient meal and medication compliant. 1750 Patient observed ambulating in the pagan and interacting with staff. Patient calm and cooperative.

## 2022-08-02 PROCEDURE — 65220000003 HC RM SEMIPRIVATE PSYCH

## 2022-08-02 PROCEDURE — 74011250637 HC RX REV CODE- 250/637: Performed by: PSYCHIATRY & NEUROLOGY

## 2022-08-02 PROCEDURE — 74011636637 HC RX REV CODE- 636/637: Performed by: PSYCHIATRY & NEUROLOGY

## 2022-08-02 RX ADMIN — ESCITALOPRAM OXALATE 10 MG: 10 TABLET ORAL at 08:14

## 2022-08-02 RX ADMIN — OLANZAPINE 5 MG: 5 TABLET, FILM COATED ORAL at 22:01

## 2022-08-02 RX ADMIN — BUPRENORPHINE AND NALOXONE 1 FILM: 8; 2 FILM BUCCAL; SUBLINGUAL at 08:14

## 2022-08-02 RX ADMIN — HYDROXYZINE HYDROCHLORIDE 50 MG: 25 TABLET ORAL at 22:01

## 2022-08-02 RX ADMIN — TRAZODONE HYDROCHLORIDE 50 MG: 50 TABLET ORAL at 22:01

## 2022-08-02 NOTE — PROGRESS NOTES
Problem: Falls - Risk of  Goal: *Absence of Falls  Description: Document Jael Melvin Fall Risk and appropriate interventions in the flowsheet.   Outcome: Progressing Towards Goal  Note: Fall Risk Interventions:            Medication Interventions: Teach patient to arise slowly                   Problem: Suicide  Goal: *STG: Remains safe in hospital  Outcome: Progressing Towards Goal  Goal: *STG/LTG: Complies with medication therapy  Outcome: Progressing Towards Goal  Goal: *STG/LTG: No longer expresses self destructive or suicidal thoughts  Outcome: Progressing Towards Goal     Problem: Infection - Risk of, Multi-drug Resistant Organism Colonization (MDRO)  Goal: *Absence of infection signs and symptoms  Outcome: Progressing Towards Goal     Problem: Anxiety-Behavioral Health (Adult/Pediatric)  Goal: *STG: Remains safe in hospital  Outcome: Progressing Towards Goal  Goal: *STG: Seeks staff when feelings of anxiety and fear arise  Outcome: Progressing Towards Goal  Goal: *STG/LTG: Complies with medication therapy  Outcome: Progressing Towards Goal

## 2022-08-02 NOTE — PROGRESS NOTES
Assumed care of patient after receiving shift report from outgoing nurse. Patient was on unit. Pt cooperative with vitals and assessment. A&O x 4. Independent in ADLs. Gait is steady. Sleep is good with PRN Rx on unit. Appetite patterns WNL. Denies AVH/SI/HI/Anxiety/Depression. Last BM was normal, reports some constipation but declines intervention. Pt denies pain. Pt encouraged to continue to participate in care. Pt requesting PRN Atarax, Zyprexa, and Trazodone for anxiety, agitation, and sleep aid with good effect. Pt has been observed throughout the night. Hourly rounds maintained by primary nurse and/or supportive staff. Total hours slept approximately 8. No s/s of distress noted. Patient has remained safe.

## 2022-08-02 NOTE — BH NOTES
CHIEF COMPLAINT:  \"Not good\"     INTERVAL HISTORY:  Rob states that she is still not feeling well. She endorses SI, but denies plan/means/intent while on the unit. She endorses decreased sleep. She denies any side effects from the medications. She denies any questions or concerns at this time. 8/1 - Nabor Lemos  reports feeling very depressed and felt it was pointless to take medications due to people changing them when she leaves the hospital.  Notes being frustrated with the situation. Denies SI/HI/AH/VH. No aggression or violence. Appropriately interactive and aware. Tolerating medications well. Eating and sleeping fairly. 8/2 - Gucci Iglesias reports feeling a little better today and moods are improving. Denies SI/HI/AH/VH. Some constipation but not yet  concern for her. No aggression or violence. Appropriately interactive and aware. Tolerating medications well having received prn atarax, trazodone and zyprexa. Eating fairly but notes sleeping poorly due to the 15 minute safety checks. [facility] and may have placement. LABS:  Recent Results   No results found for this or any previous visit (from the past 24 hour(s)).          VITALS:  Patient Vitals for the past 24 hrs:    Temp Pulse Resp BP SpO2   07/30/22 0946 97 °F (36.1 °C) 67 17 (!) 91/54 97 %   07/29/22 2008 98.2 °F (36.8 °C) 62 16 (!) 91/58 98 %      MEDICATIONS:     Current Facility-Administered Medications:    OLANZapine (ZyPREXA) tablet 5 mg, 5 mg, Oral, Q6H PRN, Mick Stubbs MD    haloperidol lactate (HALDOL) injection 5 mg, 5 mg, IntraMUSCular, Q6H PRN, Mick Stubbs MD    benztropine (COGENTIN) tablet 1 mg, 1 mg, Oral, BID PRN, Mick Stubbs MD    diphenhydrAMINE (BENADRYL) injection 50 mg, 50 mg, IntraMUSCular, BID PRN, Mick Stubbs MD    hydrOXYzine HCL (ATARAX) tablet 50 mg, 50 mg, Oral, TID PRN, Mick Stubbs MD, 50 mg at 07/29/22 2138    traZODone (DESYREL) tablet 50 mg, 50 mg, Oral, QHS PRN, Philip Rodriguez MD, 50 mg at 07/29/22 2138    acetaminophen (TYLENOL) tablet 650 mg, 650 mg, Oral, Q4H PRN, Philip Rodriguez MD    magnesium hydroxide (MILK OF MAGNESIA) 400 mg/5 mL oral suspension 30 mL, 30 mL, Oral, DAILY PRN, Philip Rodriguez MD    midazolam (VERSED) injection 2 mg, 2 mg, IntraMUSCular, Q4H PRN, Philip Rodriguez MD    buprenorphine-naloxone (SUBOXONE) 8mg-2mg SL film, 1 Film, SubLINGual, DAILY, Philip Rodriguez MD      MSE:  Eye contact: poor  Grooming: fair  Psychomotor activity: decreased  Speech is spontaneous, quiet  Mood is irritable  Affect: constricted  Perception: Denies AVH  Suicidal ideation: denies  Cognition is grossly intact  Insight: poor  Judgment: poor        PHYSICAL EXAM:  Body habitus: Body mass index is 20.6 kg/m². Musculoskeletal system: normal gait  Tremor - neg  Cog wheeling - neg     PLAN:  Continue current medications.   Restart lexapro 10 mg po Qdaily  Mrsa swab of nares  Dispo planning per case management to [facility]

## 2022-08-02 NOTE — GROUP NOTE
[facility] GROUP DOCUMENTATION INDIVIDUAL                                                                          Group Therapy Note    Date: 8/2/2022    Group Start Time: 1500  Group End Time: 1600  Group Topic: Recreational/Music Therapy    [facility] - [place] [address] ACUTE BEHAV [facility]    Josue Olivares [address]80 GROUP    Group Therapy Note    Attendees: 3       Attendance: Did not attend        Lynda Shelby

## 2022-08-02 NOTE — GROUP NOTE
AISHA  GROUP DOCUMENTATION INDIVIDUAL                                                                          Group Therapy Note    Date: 8/2/2022    Group Start Time: 0900  Group End Time: 1000  Group Topic: Topic Group    [facility] [place] 3 ACUTE BEHAV [facility]    Josue Olivares 1464 GROUP    Group Therapy Note    Attendees: 3       Attendance: Did not attend        Kamlesh Ellsworth

## 2022-08-02 NOTE — PROGRESS NOTES
Problem: Suicide  Goal: *STG: Remains safe in hospital  Outcome: Progressing Towards Goal  Goal: *STG: Seeks staff when feelings of self harm or harm towards others arise  Outcome: Progressing Towards Goal     Shift change report given to Maddie STEINER (oncoming nurse) by Kamlesh Wilkins (offgoing nurse). Report included the following information SBAR, Kardex, MAR, and Recent Results. Assumed care of patient. Met patient in hallway. Calm and cooperative during assessment. A&Ox4. Patient states that she did not sleep well. Denies anxiety, depression, SI, HI, and AVH. No complaints of pain. Medication and meal compliant. Patient discharged focused. Patient isolative to room, no issues noted throughout shift.

## 2022-08-03 PROCEDURE — 65220000003 HC RM SEMIPRIVATE PSYCH

## 2022-08-03 PROCEDURE — 74011250637 HC RX REV CODE- 250/637: Performed by: PSYCHIATRY & NEUROLOGY

## 2022-08-03 PROCEDURE — 74011636637 HC RX REV CODE- 636/637: Performed by: PSYCHIATRY & NEUROLOGY

## 2022-08-03 RX ADMIN — BUPRENORPHINE AND NALOXONE 1 FILM: 8; 2 FILM BUCCAL; SUBLINGUAL at 08:55

## 2022-08-03 RX ADMIN — ESCITALOPRAM OXALATE 10 MG: 10 TABLET ORAL at 08:56

## 2022-08-03 RX ADMIN — TRAZODONE HYDROCHLORIDE 50 MG: 50 TABLET ORAL at 20:07

## 2022-08-03 NOTE — BH NOTES
CHIEF COMPLAINT:  \"Not good\"     INTERVAL HISTORY:  [employer] states that she is still not feeling well. She endorses SI, but denies plan/means/intent while on the unit. She endorses decreased sleep. She denies any side effects from the medications. She denies any questions or concerns at this time. 8/1 - Benji Maldonado  reports feeling very depressed and felt it was pointless to take medications due to people changing them when she leaves the hospital.  Notes being frustrated with the situation. Denies SI/HI/AH/VH. No aggression or violence. Appropriately interactive and aware. Tolerating medications well. Eating and sleeping fairly. 8/2 - Andry Iglesias reports feeling a little better today and moods are improving. Denies SI/HI/AH/VH. Some constipation but not yet  concern for her. No aggression or violence. Appropriately interactive and aware. Tolerating medications well having received prn atarax, trazodone and zyprexa. Eating fairly but notes sleeping poorly due to the 15 minute safety checks. [facility] and may have placement. 8/3 - Benji Maldonado reports feeling well and moods are fair. Denies SI/HI/AH/VH. No aggression or violence. Appropriately interactive and aware. Tolerating medications well. Eating and sleeping fairly (8hrs). LABS:  Recent Results   No results found for this or any previous visit (from the past 24 hour(s)).          VITALS:  Patient Vitals for the past 24 hrs:    Temp Pulse Resp BP SpO2   07/30/22 0946 97 °F (36.1 °C) 67 17 (!) 91/54 97 %   07/29/22 2008 98.2 °F (36.8 °C) 62 16 (!) 91/58 98 %      MEDICATIONS:     Current Facility-Administered Medications:    OLANZapine (ZyPREXA) tablet 5 mg, 5 mg, Oral, Q6H PRN, Álvaro Espinoza MD    haloperidol lactate (HALDOL) injection 5 mg, 5 mg, IntraMUSCular, Q6H PRN, Álvaro Espinoza MD    benztropine (COGENTIN) tablet 1 mg, 1 mg, Oral, BID PRN, Álvaro Espinoza MD    diphenhydrAMINE (BENADRYL) injection 50 mg, 50 mg, IntraMUSCular, BID PRN, Ravi Gandara MD    hydrOXYzine HCL (ATARAX) tablet 50 mg, 50 mg, Oral, TID PRN, Ravi Gandara MD, 50 mg at 07/29/22 2138    traZODone (DESYREL) tablet 50 mg, 50 mg, Oral, QHS PRN, Ravi Gandara MD, 50 mg at 07/29/22 2138    acetaminophen (TYLENOL) tablet 650 mg, 650 mg, Oral, Q4H PRN, Ravi Gandara MD    magnesium hydroxide (MILK OF MAGNESIA) 400 mg/5 mL oral suspension 30 mL, 30 mL, Oral, DAILY PRN, Ravi Gandara MD    midazolam (VERSED) injection 2 mg, 2 mg, IntraMUSCular, Q4H PRN, Ravi Gandara MD    buprenorphine-naloxone (SUBOXONE) 8mg-2mg SL film, 1 Film, SubLINGual, DAILY, Ravi Gandara MD      MSE:  Eye contact: poor  Grooming: fair  Psychomotor activity: decreased  Speech is spontaneous, quiet  Mood is irritable  Affect: constricted  Perception: Denies AVH  Suicidal ideation: denies  Cognition is grossly intact  Insight: poor  Judgment: poor        PHYSICAL EXAM:  Body habitus: Body mass index is 20.6 kg/m². Musculoskeletal system: normal gait  Tremor - neg  Cog wheeling - neg     PLAN:  Continue current medications.   Continue lexapro 10 mg po Qdaily  Mrsa swab of nares pending  Dispo planning per case management to [facility]

## 2022-08-03 NOTE — GROUP NOTE
AISHA  GROUP DOCUMENTATION INDIVIDUAL                                                                          Group Therapy Note    Date: 8/3/2022    Group Start Time: 1400  Group End Time: 1500  Group Topic: Recreational/Music Therapy    [facility] [place] [address] ACUTE BEHAV [facility]    [place], [address] [facility] GROUP DOCUMENTATION GROUP    Group Therapy Note    Attendees: 7       Attendance: Attended    Patient's Goal:  To concentrate on selected task    Interventions/techniques: Supported-crafts,games,music    Follows Directions:  Followed directions    Interactions: Interacted appropriately    Mental Status: Calm and Flat    Behavior/appearance: Attentive, Cooperative, and Needed prompting    Goals Achieved: Able to engage in interactions and Able to listen to others-discussed coping,worked on selected task      Francisco Javier Teresa

## 2022-08-03 NOTE — PROGRESS NOTES
Pt screened per LOS policy. No acute nutrition or weight issues identified. Pt meal compliant. Hx notable for substance abuse, Hep C.   Ht: 5'4\"  Wt: 129 lb 10.1 oz  BMI: 22.25 kg/(m^2) c/w normal weight  Est energy needs: 1715 kcal, 68 g protein, 1 mL/kcal fluids  Pt will consume > 75% of meals at follow up 7-10 days  LOS

## 2022-08-03 NOTE — BH NOTES
Behavioral Health Treatment Team Note     Patient goal(s) for today: Patient Continue taking meds as prescribed, engage in unit activities, participate in hygiene/ADLs, prepare for discharge, follow directions from staff, contact support team, attend groups   Treatment team focus/goals: will continue to maintain a calm and cooperative mood and communicate her needs with staff. Continue taking meds as prescribed, engage in unit activities, participate in hygiene/ADLs, prepare for discharge, follow directions from staff, contact support team, attend groups     Progress note: Patient met with treatment team. Patient was alert and oriented x 4. Patient presents with a depressed mood, flat affect, and linear thought process. Patient denies SI/HI/AH/VH. Patient continues to isolate in her room and does not interact with peers. Patient reports that she plans to discharge back to [facility]. This writer spoke with Brook Hayward [phone]. He reports that patient is able to return back to the program and they are able to secure a bed for her until Tuesday next week. SW encouraged patient to attend groups and patient will be moving to the general side in effort to help patient interact with peers and identify coping skills to manage her depression.      LOS:  5  Expected LOS: Discharge by Tuesday next week     Insurance info/prescription coverage:  [facility] MEDICAID/[place] [employer] [facility]  Discharge plan:  [facility]   Guns in the home:  no   Outpatient provider(s):  Dannie Salazar [phone]    Participating treatment team members: Euell Babinski, Schuyler Glaser, MSW, Adiel Curran MD

## 2022-08-03 NOTE — PROGRESS NOTES
Spiritual Care Assessment/Progress Note  [facility]      NAME: Alondra Manzo      MRN: [id number]  AGE: 49 y.o. SEX: female  Hinduism Affiliation: No preference   Language: English     8/3/2022     Total Time (in minutes): 10     Spiritual Assessment begun in [place] [address] [address] [place] [address] through conversation with:         []Patient        [] Family    [] Friend(s)        Reason for Consult: Initial/Spiritual assessment, patient floor     Spiritual beliefs: (Please include comment if needed)     [] Identifies with a tammy tradition:         [] Supported by a tammy community:            [] Claims no spiritual orientation:           [] Seeking spiritual identity:                [] Adheres to an individual form of spirituality:           [x] Not able to assess:                           Identified resources for coping:      [] Prayer                               [] Music                  [] Guided Imagery     [] Family/friends                 [] Pet visits     [] Devotional reading                         [x] Unknown     [] Other:                                               Interventions offered during this visit: (See comments for more details)    Patient Interventions: Initial/Spiritual assessment, patient floor           Plan of Care:     [] Support spiritual and/or cultural needs    [] Support AMD and/or advance care planning process      [] Support grieving process   [] Coordinate Rites and/or Rituals    [] Coordination with community clergy   [] No spiritual needs identified at this time   [] Detailed Plan of Care below (See Comments)  [] Make referral to Music Therapy  [] Make referral to Pet Therapy     [] Make referral to Addiction services  [] Make referral to [facility]  [] Make referral to Spiritual Care Partner  [] No future visits requested        [x] Contact Spiritual Care for further referrals     Comments:  visit for initial spiritual assessment.   Staff with patient at the time of this visit. Please contact spiritual care services for further referral or consult. Rev.  Patricia Scott MDiv, [facility], [facility]   paging service: [phone])

## 2022-08-03 NOTE — PROGRESS NOTES
Problem: Falls - Risk of  Goal: *Absence of Falls  Description: Document Emmanuel Patel Fall Risk and appropriate interventions in the flowsheet.   Outcome: Progressing Towards Goal  Note: Fall Risk Interventions:            Medication Interventions: Teach patient to arise slowly                   Problem: Patient Education: Go to Patient Education Activity  Goal: Patient/Family Education  Outcome: Progressing Towards Goal

## 2022-08-03 NOTE — DISCHARGE INSTRUCTIONS
DISCHARGE SUMMARY    Alicia Manzo  : 1970  MRN: [id number]    The patient Adwoa Grams exhibits the ability to control behavior in a less restrictive environment. Patient's level of functioning is improving. No assaultive/destructive behavior has been observed for the past 24 hours. No suicidal/homicidal threat or behavior has been observed for the past 24 hours. There is no evidence of serious medication side effects. Patient has not been in physical or protective restraints for at least the past 24 hours. If weapons involved, how are they secured? None    Is patient aware of and in agreement with discharge plan? Yes    Arrangements for medication:  Prescriptions given to patient, given a weeks supply or 30 day supply. Copy of discharge instructions to provider?:  Yes    Arrangements for transportation home:  [place]    Keep all follow up appointments as scheduled, continue to take prescribed medications per physician instructions.   Mental health crisis number:  582 or your local mental health crisis line number at [address] [facility] at [address] [place] [address] Emergency WARM LINE      [phone]-MHAV ([phone])      M-F: 9am to 9pm      Sat & Sun: 5pm - 9pm  National suicide prevention lines:                             9-228-XMXJIIT (2-969-884-294-563-9141)       6-522-920-TALK ([phone])    Crisis Text Line:  Text HOME to 067923

## 2022-08-03 NOTE — PROGRESS NOTES
Problem: Falls - Risk of  Goal: *Absence of Falls  Description: Document Walsh Reason Fall Risk and appropriate interventions in the flowsheet.   Outcome: Progressing Towards Goal  Note: Fall Risk Interventions:            Medication Interventions: Teach patient to arise slowly                   Problem: Suicide  Goal: *STG: Remains safe in hospital  Outcome: Progressing Towards Goal  Goal: *STG/LTG: Complies with medication therapy  Outcome: Progressing Towards Goal  Goal: *STG/LTG: No longer expresses self destructive or suicidal thoughts  Outcome: Progressing Towards Goal     Problem: Anxiety-Behavioral Health (Adult/Pediatric)  Goal: *STG: Remains safe in hospital  Outcome: Progressing Towards Goal  Goal: *STG: Seeks staff when feelings of anxiety and fear arise  Outcome: Progressing Towards Goal  Goal: *STG/LTG: Complies with medication therapy  Outcome: Progressing Towards Goal     Problem: Depressed Mood (Adult/Pediatric)  Goal: *STG: Remains safe in hospital  Outcome: Progressing Towards Goal  Goal: *STG: Complies with medication therapy  Outcome: Progressing Towards Goal

## 2022-08-03 NOTE — GROUP NOTE
AISHA  GROUP DOCUMENTATION INDIVIDUAL                                                                          Group Therapy Note    Date: 8/3/2022    Group Start Time: 1000  Group End Time: 1100  Group Topic: Topic Group    [facility] [place] 3 ACUTE BEHAV [facility]    Josue Olivares 7923 GROUP    Group Therapy Note    Attendees: 5       Attendance: Did not attend        Hoang Stallings

## 2022-08-03 NOTE — PROGRESS NOTES
Assumed care of patient after receiving shift report from outgoing nurse. Patient was visible on unit. Pt cooperative with vitals and assessment. A&O x 4. Independent in ADLs. Gait is steady. Sleep is poor. Appetite patterns WNL. Denies AVH/SI/HI. Endorses Anxiety/Depression. Last BM was 07/31/21, hard. Declines offer for M-O-M. Pt denies pain. Pt encouraged to continue to participate in care. Pt received PRN Atarax, Zyprexa, and Trazodone for anxiety, agitation, and sleep aid. Pt has been observed throughout the night. Hourly rounds maintained by primary nurse and/or supportive staff. Total hours slept approximately 8. No s/s of distress noted. Patient has remained safe.

## 2022-08-04 LAB
BACTERIA SPEC CULT: NORMAL
BACTERIA SPEC CULT: NORMAL
SERVICE CMNT-IMP: NORMAL

## 2022-08-04 PROCEDURE — 74011636637 HC RX REV CODE- 636/637: Performed by: PSYCHIATRY & NEUROLOGY

## 2022-08-04 PROCEDURE — 74011250637 HC RX REV CODE- 250/637

## 2022-08-04 PROCEDURE — 65220000003 HC RM SEMIPRIVATE PSYCH

## 2022-08-04 PROCEDURE — 74011250637 HC RX REV CODE- 250/637: Performed by: PSYCHIATRY & NEUROLOGY

## 2022-08-04 RX ORDER — DM/P-EPHED/ACETAMINOPH/DOXYLAM 30-7.5/3
2 LIQUID (ML) ORAL
Status: DISCONTINUED | OUTPATIENT
Start: 2022-08-04 | End: 2022-08-05 | Stop reason: HOSPADM

## 2022-08-04 RX ADMIN — HYDROXYZINE HYDROCHLORIDE 50 MG: 25 TABLET ORAL at 21:46

## 2022-08-04 RX ADMIN — TRAZODONE HYDROCHLORIDE 50 MG: 50 TABLET ORAL at 21:46

## 2022-08-04 RX ADMIN — ACETAMINOPHEN 650 MG: 325 TABLET ORAL at 21:47

## 2022-08-04 RX ADMIN — BUPRENORPHINE AND NALOXONE 1 FILM: 8; 2 FILM BUCCAL; SUBLINGUAL at 09:55

## 2022-08-04 RX ADMIN — Medication 2 MG: at 21:17

## 2022-08-04 RX ADMIN — ESCITALOPRAM OXALATE 10 MG: 10 TABLET ORAL at 09:55

## 2022-08-04 NOTE — PROGRESS NOTES
Received patient at 1. Patient standing up in room looking out the window. Patient is calm and cooperative. Rates her anxiety and depression both 3/10. Denies SI/HI/AVH. Affect is flat. No voiced concerns at this time. Patient at snack. Requested PRN Trazodone for sleep. Medication administered. Will monitor for efficacy. Patient has been isolative to her room majority of the evening. Will continue to monitor. Nursing rounds completed this shift. No issues to document at this time. Patient slept for 8 hours this shift. Will continue to monitor. Problem: Falls - Risk of  Goal: *Absence of Falls  Description: Document [facility] Fall Risk and appropriate interventions in the flowsheet.   Outcome: Progressing Towards Goal  Note: Fall Risk Interventions:            Medication Interventions: Teach patient to arise slowly                   Problem: Suicide  Goal: *STG: Remains safe in hospital  Outcome: Progressing Towards Goal

## 2022-08-04 NOTE — GROUP NOTE
AISHA  GROUP DOCUMENTATION INDIVIDUAL                                                                          Group Therapy Note    Date: 8/4/2022    Group Start Time: 1000  Group End Time: 1100  Group Topic: Topic Group    [facility] [place] 3 ACUTE BEHAV [facility]    Josue Olivares 9374 GROUP    Group Therapy Note    Attendees: 6       Attendance: Did not attend      Best Solomon

## 2022-08-04 NOTE — PROGRESS NOTES
0730 Pt received in room. Pt denies si/hi/ah/vh. Pt reports going to groups and eating outside in the dayroom. Anxiety=0/10 depression= 0/10. Pt is Aox4. Pt is calm and cooperative. Med and meal compliant. Pain level of 0/10 . Will continue to monitor for any changes. Please see MAR for PRN medication administration           Problem: Falls - Risk of  Goal: *Absence of Falls  Description: Document [facility] Fall Risk and appropriate interventions in the flowsheet.   Outcome: Progressing Towards Goal  Note: Fall Risk Interventions:            Medication Interventions: Teach patient to arise slowly       Problem: Patient Education: Go to Patient Education Activity  Goal: Patient/Family Education  Outcome: Progressing Towards Goal     Problem: Suicide  Goal: *STG: Remains safe in hospital  Outcome: Progressing Towards Goal

## 2022-08-04 NOTE — GROUP NOTE
AISHA  GROUP DOCUMENTATION INDIVIDUAL                                                                          Group Therapy Note    Date: 8/4/2022    Group Start Time: 1400  Group End Time: 1500  Group Topic: Recreational/Music Therapy    [facility] [place] [address] ACUTE BEHAV [facility]    [place], [address] [facility] GROUP DOCUMENTATION GROUP    Group Therapy Note    Attendees: 7       Attendance: Attended    Patient's Goal:  To concentrate on selected task    Interventions/techniques: Supported-crafts,games,music    Follows Directions:  Followed directions    Interactions: Interacted appropriately    Mental Status: Calm-pleasant    Behavior/appearance: Attentive and Cooperative    Goals Achieved: Able to engage in interactions and Able to listen to others-worked on selected task-listened to [address]

## 2022-08-04 NOTE — BH NOTES
CHIEF COMPLAINT:  \"Not good\"     INTERVAL HISTORY:  Acacia Tellez states that she is still not feeling well. She endorses SI, but denies plan/means/intent while on the unit. She endorses decreased sleep. She denies any side effects from the medications. She denies any questions or concerns at this time. 8/1 - Hansel Cantu  reports feeling very depressed and felt it was pointless to take medications due to people changing them when she leaves the hospital.  Notes being frustrated with the situation. Denies SI/HI/AH/VH. No aggression or violence. Appropriately interactive and aware. Tolerating medications well. Eating and sleeping fairly. 8/2 - Shaniqua Iglesias reports feeling a little better today and moods are improving. Denies SI/HI/AH/VH. Some constipation but not yet  concern for her. No aggression or violence. Appropriately interactive and aware. Tolerating medications well having received prn atarax, trazodone and zyprexa. Eating fairly but notes sleeping poorly due to the 15 minute safety checks. [facility] and may have placement. 8/3 - Hansel Cantu reports feeling well and moods are fair. Denies SI/HI/AH/VH. No aggression or violence. Appropriately interactive and aware. Tolerating medications well. Eating and sleeping fairly (8hrs). 8/4 - Hansel Cantu reports doing well and moods are good. Isolates to room but is pleasant and cooperative. Denies SI/HI/AH/VH. No aggression or violence. Appropriately interactive and aware. Tolerating medications well. Eating and sleeping well (8hrs). Discharge focused. LABS:  Recent Results   No results found for this or any previous visit (from the past 24 hour(s)).          VITALS:  Patient Vitals for the past 24 hrs:    Temp Pulse Resp BP SpO2   07/30/22 0946 97 °F (36.1 °C) 67 17 (!) 91/54 97 %   07/29/22 2008 98.2 °F (36.8 °C) 62 16 (!) 91/58 98 %      MEDICATIONS:     Current Facility-Administered Medications: OLANZapine (ZyPREXA) tablet 5 mg, 5 mg, Oral, Q6H PRN, Canelo Jaime MD    haloperidol lactate (HALDOL) injection 5 mg, 5 mg, IntraMUSCular, Q6H PRN, Canelo Jaime MD    benztropine (COGENTIN) tablet 1 mg, 1 mg, Oral, BID PRN, Canelo Jaime MD    diphenhydrAMINE (BENADRYL) injection 50 mg, 50 mg, IntraMUSCular, BID PRN, Canelo Jaime MD    hydrOXYzine HCL (ATARAX) tablet 50 mg, 50 mg, Oral, TID PRN, Canelo Jaime MD, 50 mg at 07/29/22 2138    traZODone (DESYREL) tablet 50 mg, 50 mg, Oral, QHS PRN, Canelo Jaime MD, 50 mg at 07/29/22 2138    acetaminophen (TYLENOL) tablet 650 mg, 650 mg, Oral, Q4H PRN, Canelo Jaime MD    magnesium hydroxide (MILK OF MAGNESIA) 400 mg/5 mL oral suspension 30 mL, 30 mL, Oral, DAILY PRN, Canelo Jaime MD    midazolam (VERSED) injection 2 mg, 2 mg, IntraMUSCular, Q4H PRN, Canelo Jaime MD    buprenorphine-naloxone (SUBOXONE) 8mg-2mg SL film, 1 Film, SubLINGual, DAILY, Canelo Jaime MD      MSE:  Eye contact: good  Grooming: fair  Psychomotor activity: fair  Speech is spontaneous, average rate and volume  Mood is calm  Affect: constricted  Perception: Denies AVH  Suicidal ideation: denies  Cognition is grossly intact  Insight: fair  Judgment: fair        PHYSICAL EXAM:  Body habitus: Body mass index is 20.6 kg/m². Musculoskeletal system: normal gait  Tremor - neg  Cog wheeling - neg     PLAN:  Continue current medications.   Continue lexapro 10 mg po Qdaily  Mrsa swab of nares pending  Dispo planning per case management to [facility] for tomorrow

## 2022-08-05 VITALS
DIASTOLIC BLOOD PRESSURE: 62 MMHG | OXYGEN SATURATION: 99 % | RESPIRATION RATE: 16 BRPM | TEMPERATURE: 97.6 F | SYSTOLIC BLOOD PRESSURE: 97 MMHG | HEART RATE: 60 BPM

## 2022-08-05 PROCEDURE — 74011636637 HC RX REV CODE- 636/637: Performed by: PSYCHIATRY & NEUROLOGY

## 2022-08-05 PROCEDURE — 74011250637 HC RX REV CODE- 250/637: Performed by: PSYCHIATRY & NEUROLOGY

## 2022-08-05 RX ORDER — HYDROXYZINE 50 MG/1
50 TABLET, FILM COATED ORAL
Qty: 30 TABLET | Refills: 0 | Status: SHIPPED | OUTPATIENT
Start: 2022-08-05 | End: 2022-08-15

## 2022-08-05 RX ORDER — TRAZODONE HYDROCHLORIDE 50 MG/1
50 TABLET ORAL
Qty: 30 TABLET | Refills: 0 | Status: SHIPPED | OUTPATIENT
Start: 2022-08-05

## 2022-08-05 RX ORDER — ESCITALOPRAM OXALATE 10 MG/1
10 TABLET ORAL DAILY
Qty: 30 TABLET | Refills: 0 | Status: SHIPPED | OUTPATIENT
Start: 2022-08-06

## 2022-08-05 RX ADMIN — BUPRENORPHINE AND NALOXONE 1 FILM: 8; 2 FILM BUCCAL; SUBLINGUAL at 08:39

## 2022-08-05 RX ADMIN — ACETAMINOPHEN 650 MG: 325 TABLET ORAL at 12:02

## 2022-08-05 RX ADMIN — ESCITALOPRAM OXALATE 10 MG: 10 TABLET ORAL at 08:39

## 2022-08-05 NOTE — DISCHARGE SUMMARY
PSYCHIATRIC DISCHARGE SUMMARY         IDENTIFICATION:    Patient Name  Micah Whitley   Date of Birth 1970   [facility] [id number]   Medical Record Number  [id number]      Age  49 y.o. PCP Other, MD Ike   Admit date:  7/29/2022    Discharge date: 8/5/2022   Room Number  784/37  @ [address]   Date of Service  8/5/2022            TYPE OF DISCHARGE: REGULAR               CONDITION AT DISCHARGE: improved       PROVISIONAL & DISCHARGE DIAGNOSES:    Problem List  Date Reviewed: 9/7/2019            Codes Class    * (Principal) Unspecified psychosis not due to a substance or known physiological condition ([facility] ICD-10-CM: F29  ICD-9-CM: 298.9         Septic joint of left knee joint ([facility].) ICD-10-CM: M00.9  ICD-9-CM: 711.06         Pyogenic arthritis of right shoulder region [facility]) ICD-10-CM: M00.9  ICD-9-CM: 711.01         Septic arthritis ([facility].) ICD-10-CM: M00.9  ICD-9-CM: 711.00         Infection of hand ICD-10-CM: L08.9  ICD-9-CM: 133. 9         Cellulitis of hand ICD-10-CM: K34.321  ICD-9-CM: 915. [address] Problems    *Unspecified psychosis not due to a substance or known physiological condition ([facility].)        DISCHARGE DIAGNOSIS:   Axis I:  SEE ABOVE  Axis II: SEE ABOVE  Axis III: SEE ABOVE  Axis IV:  lack of structure  Axis V:  <50 on admission, 55+ on discharge     CC & HISTORY OF PRESENT ILLNESS:  60-year-old  female with a history of depression who came to the hospital after complaining of bouts of suicide with plans to cut her wrists, where she has done that in the past and she has overdosed on medicines and she has not done any of that today. She was just feeling that way and before she got to that point, she came to the hospital to get help. She has no psychiatrist or therapist to treat her condition and no supports.   She came in to get help     SOCIAL HISTORY:    Social History     Socioeconomic History    Marital status: SINGLE     Spouse name: Not on file Number of children: Not on file    Years of education: Not on file    Highest education level: Not on file   Occupational History    Not on file   Tobacco Use    Smoking status: Every Day     Packs/day: 1.00     Types: Cigarettes    Smokeless tobacco: Never   Substance and Sexual Activity    Alcohol use: Yes     Comment: 1-2 beers per day    Drug use: Yes     Types: Prescription     Comment: pt is on methadone    Sexual activity: Not on file   Other Topics Concern    Not on file   Social History Narrative    Not on file     Social Determinants of Health     Financial Resource Strain: Not on file   Food Insecurity: Not on file   Transportation Needs: Not on file   Physical Activity: Not on file   Stress: Not on file   Social Connections: Not on file   Intimate Partner Violence: Not on file   Housing Stability: Not on file      FAMILY HISTORY:   No family history on file. HOSPITALIZATION COURSE:    Jian Chen was admitted to the inpatient psychiatric unit [facility] for acute psychiatric stabilization in regards to symptomatology as described in the HPI above. The differential diagnosis at time of admission included: Schizophrenia vs substance induced psychotic disorder schizoaffective vs bipolar MDD vs adjustment disorder. While on the unit Jian Chen was involved in individual, group, occupational and milieu therapy. Psychiatric medications were adjusted during this hospitalization. Jian Chen demonstrated a progressive improvement in overall condition. Much of patient's initial presentation appeared to be related to situational stressors, effects of medication non-compliance drugs of abuse, and psychological factors. Please see individual progress notes for more specific details regarding patient's hospitalization course. Jian Chen' reports feeling well and moods are good. Denies SI/HI/AH/VH. No aggression or violence.   Appropriately interactive and aware. Tolerating medications well. Eating and sleeping fairly. Patient with request for discharge today. There are no grounds to seek a TDO. At time of discharge, Mariaa Layton is without significant problems of depression, psychosis, or galo. Patient free of suicidal and homicidal ideations (appears to be at very low risk of suicide or homicide) and reports many positive predictive factors in terms of not attempting suicide or homicide. Overall presentation at time of discharge is most consistent with the diagnosis of Major depressive Disorder with psychosis. Patient has maximized benefit to be derived from acute inpatient psychiatric treatment. All members of the treatment team concur with each other in regards to plans for discharge today. Patient and family are aware and in agreement with discharge and discharge plan. LABS AND IMAGAING:    Labs Reviewed   CULTURE, MRSA     No results found for: DS35, PHEN, PHENO, PHENT, DILF, DS39, PHENY, PTN, VALF2, VALAC, VALP, VALPR, DS6, CRBAM, CRBAMP, CARB2, XCRBAM  Admission on 07/29/2022   Component Date Value Ref Range Status    Special Requests: 08/03/2022 NO SPECIAL REQUESTS    Final    Culture result: 08/03/2022 MRSA NOT PRESENT    Final    Culture result: 08/03/2022     Final                    Value:Screening of patient nares for MRSA is for surveillance purposes and, if positive, to facilitate isolation considerations in high risk settings. It is not intended for automatic decolonization interventions per se as regimens are not sufficiently effective to warrant routine use.      Admission on 07/28/2022, Discharged on 07/29/2022   Component Date Value Ref Range Status    WBC 07/28/2022 6.4  3.6 - 11.0 K/uL Final    RBC 07/28/2022 4.27  3.80 - 5.20 M/uL Final    HGB 07/28/2022 13.1  11.5 - 16.0 g/dL Final    HCT 07/28/2022 37.2  35.0 - 47.0 % Final    MCV 07/28/2022 87.1  80.0 - 99.0 FL Final    MCH 07/28/2022 30.7  26.0 - 34.0 PG Final    MCHC 07/28/2022 35.2  30.0 - 36.5 g/dL Final    RDW 07/28/2022 12.6  11.5 - 14.5 % Final    PLATELET 24/83/0109 357  150 - 400 K/uL Final    MPV 07/28/2022 10.7  8.9 - 12.9 FL Final    NRBC 07/28/2022 0.0  0  WBC Final    ABSOLUTE NRBC 07/28/2022 0.00  0.00 - 0.01 K/uL Final    NEUTROPHILS 07/28/2022 49  32 - 75 % Final    LYMPHOCYTES 07/28/2022 39  12 - 49 % Final    MONOCYTES 07/28/2022 10  5 - 13 % Final    EOSINOPHILS 07/28/2022 2  0 - 7 % Final    BASOPHILS 07/28/2022 0  0 - 1 % Final    IMMATURE GRANULOCYTES 07/28/2022 0  0.0 - 0.5 % Final    ABS. NEUTROPHILS 07/28/2022 3.1  1.8 - 8.0 K/UL Final    ABS. LYMPHOCYTES 07/28/2022 2.5  0.8 - 3.5 K/UL Final    ABS. MONOCYTES 07/28/2022 0.7  0.0 - 1.0 K/UL Final    ABS. EOSINOPHILS 07/28/2022 0.1  0.0 - 0.4 K/UL Final    ABS. BASOPHILS 07/28/2022 0.0  0.0 - 0.1 K/UL Final    ABS. IMM. GRANS. 07/28/2022 0.0  0.00 - 0.04 K/UL Final    DF 07/28/2022 AUTOMATED    Final    Sodium 07/28/2022 137  136 - 145 mmol/L Final    Potassium 07/28/2022 3.4 (A) 3.5 - 5.1 mmol/L Final    Chloride 07/28/2022 104  97 - 108 mmol/L Final    CO2 07/28/2022 27  21 - 32 mmol/L Final    Anion gap 07/28/2022 6  5 - 15 mmol/L Final    Glucose 07/28/2022 103 (A) 65 - 100 mg/dL Final    BUN 07/28/2022 22 (A) 6 - 20 MG/DL Final    Creatinine 07/28/2022 0.88  0.55 - 1.02 MG/DL Final    BUN/Creatinine ratio 07/28/2022 25 (A) 12 - 20   Final    GFR est AA 07/28/2022 >60  >60 ml/min/1.73m2 Final    GFR est non-AA 07/28/2022 >60  >60 ml/min/1.73m2 Final    Calcium 07/28/2022 9.4  8.5 - 10.1 MG/DL Final    Bilirubin, total 07/28/2022 0.5  0.2 - 1.0 MG/DL Final    ALT (SGPT) 07/28/2022 25  12 - 78 U/L Final    AST (SGOT) 07/28/2022 17  15 - 37 U/L Final    Alk.  phosphatase 07/28/2022 105  45 - 117 U/L Final    Protein, total 07/28/2022 7.5  6.4 - 8.2 g/dL Final    Albumin 07/28/2022 3.9  3.5 - 5.0 g/dL Final    Globulin 07/28/2022 3.6  2.0 - 4.0 g/dL Final    A-G Ratio 07/28/2022 1.1  1.1 - 2.2   Final    Acetaminophen level 07/28/2022 <2 (A) 10 - 30 ug/mL Final    Salicylate level 72/48/5750 4.8  2.8 - 20.0 MG/DL Final    ALCOHOL(ETHYL),SERUM 07/28/2022 <10  <10 MG/DL Final    AMPHETAMINES 07/28/2022 Negative  NEG   Final    BARBITURATES 07/28/2022 Negative  NEG   Final    BENZODIAZEPINES 07/28/2022 Negative  NEG   Final    COCAINE 07/28/2022 Negative  NEG   Final    METHADONE 07/28/2022 Negative  NEG   Final    OPIATES 07/28/2022 Negative  NEG   Final    PCP(PHENCYCLIDINE) 07/28/2022 Negative  NEG   Final    THC (TH-CANNABINOL) 07/28/2022 Negative  NEG   Final    Drug screen comment 07/28/2022 (NOTE)   Final    Color 07/28/2022 YELLOW/STRAW    Final    Appearance 07/28/2022 CLOUDY (A) CLEAR   Final    Specific gravity 07/28/2022 1.014  1.003 - 1.030   Final    pH (UA) 07/28/2022 6.0  5.0 - 8.0   Final    Protein 07/28/2022 Negative  NEG mg/dL Final    Glucose 07/28/2022 Negative  NEG mg/dL Final    Ketone 07/28/2022 Negative  NEG mg/dL Final    Bilirubin 07/28/2022 Negative  NEG   Final    Blood 07/28/2022 TRACE (A) NEG   Final    Urobilinogen 07/28/2022 1.0  0.2 - 1.0 EU/dL Final    Nitrites 07/28/2022 Negative  NEG   Final    Leukocyte Esterase 07/28/2022 SMALL (A) NEG   Final    WBC 07/28/2022 20-50  0 - 4 /hpf Final    RBC 07/28/2022 0-5  0 - 5 /hpf Final    Epithelial cells 07/28/2022 MODERATE (A) FEW /lpf Final    Bacteria 07/28/2022 2+ (A) NEG /hpf Final    Hyaline cast 07/28/2022 0-2  0 - 5 /lpf Final    Urine culture hold 07/28/2022 Urine on hold in Microbiology dept for 2 days. If unpreserved urine is submitted, it cannot be used for addtional testing after 24 hours, recollection will be required. Final    SARS-CoV-2 by PCR 07/28/2022 Not detected  NOTD   Final    Influenza A by PCR 07/28/2022 Not detected  NOTD   Final    Influenza B by PCR 07/28/2022 Not detected  NOTD   Final     No results found. DISPOSITION:    Home.  Patient to f/u with drug/etoh rehabilitation, psychiatric, and psychotherapy appointments. Patient is to f/u with internist as directed. FOLLOW-UP CARE:    Activity as tolerated  Regular diet  Wound Care: none needed. Follow-up Information       Follow up With Specialties Details Why Contact Info    [facility]  Follow up today Transportation will be taking you to [address] [place] [address]. Please go to the front lobby to complete intake assessment. Address: [address] [place] [address], [facility], [address]    Phone: [phone]    Crisis Resources  Follow up  [address]:  24/7 hotline, 1 [phone]  Warm Line:  Monday-Friday 9 AM-9 PM, Saturday-Sunday 5 PM- 9 PM  [phone]  Crisis Text Line:  24/7 crisis text messaging. Text HOME to 388867. Substance Abuse 24Hour Hotl. .. Other, [facility]   Patient can only remember the practice name and not the [address]  Follow up on 8/21/2022 Please follow up with Dr. Raymundo Amaro on Sunday August 21st for medication management and suboxone refills. Dr. Raymundo Amaro    [facility] # [phone], [address]    Phone: [phone]    [facility] on 8/15/2022 Please follow up with Yosvany Schulz NP on Monday August 15th at 11:00 am. Your appointment is in person and you will need to go inside of the [facility] to find her office. [address] [facility] (inside of [facility])    Phone: [phone]    Employment Resources  Follow up  [address] [facility] [address] [employer]    Department of [employer]  http://elidia-thomas.net/. [place].gov/    [place] [employer]  https://vcwcapital.com/jobseekers/career-services/    Employment Services Resource Guide. ..     Social Security Application  Follow up  Georgi  Please visit social security website to fill out initial application to apply for disability                   PROGNOSIS:   Fair ---- based on nature of patient's pathology/ies and treatment compliance issues. Prognosis is greatly dependent upon patient's ability to remain sober and to follow up with scheduled appointments as well as to comply with psychiatric medications as prescribed. DISCHARGE MEDICATIONS:     Informed consent given for the use of following psychotropic medications:  Current Discharge Medication List        START taking these medications    Details   escitalopram oxalate (LEXAPRO) 10 mg tablet Take 1 Tablet by mouth daily. Indications: major depressive disorder  Qty: 30 Tablet, Refills: 0  Start date: 8/6/2022      hydrOXYzine HCL (ATARAX) 50 mg tablet Take 1 Tablet by mouth three (3) times daily as needed for Anxiety for up to 10 days. Indications: anxious  Qty: 30 Tablet, Refills: 0  Start date: 8/5/2022, End date: 8/15/2022      traZODone (DESYREL) 50 mg tablet Take 1 Tablet by mouth nightly as needed for Sleep (For insomnia). Indications: insomnia associated with depression  Qty: 30 Tablet, Refills: 0  Start date: 8/5/2022           CONTINUE these medications which have NOT CHANGED    Details   buprenorphine-naloxone (Suboxone) 8-2 mg film sublingaul film Take 1 Film by SubLINGual route in the morning. Indications: dependence on opioid-type drugs                    A coordinated, multidisplinary treatment team round was conducted with Brandon Iglesias---this is done daily here at [facility]. This team consists of the nurse, psychiatric unit pharmacist,  and Laurie Groves. I have spent greater than 35 minutes on discharge work.     Signed:  Susan Herron MD  8/5/2022

## 2022-08-05 NOTE — PROGRESS NOTES
Problem: Falls - Risk of  Goal: *Absence of Falls  Description: Document Jael Melvin Fall Risk and appropriate interventions in the flowsheet.   Outcome: Resolved/Met     Problem: Patient Education: Go to Patient Education Activity  Goal: Patient/Family Education  Outcome: Resolved/Met     Problem: Suicide  Goal: *STG: Remains safe in hospital  8/5/2022 1013 by Kar Wang RN  Outcome: Resolved/Met  8/5/2022 0942 by Kar Wang RN  Outcome: Progressing Towards Goal  8/5/2022 0941 by Kar Wang RN  Outcome: Progressing Towards Goal  Goal: *STG: Seeks staff when feelings of self harm or harm towards others arise  Outcome: Resolved/Met  Goal: *STG: Attends activities and groups  Outcome: Resolved/Met  Goal: *STG:  Verbalizes alternative ways of dealing with maladaptive feelings/behaviors  Outcome: Resolved/Met  Goal: *STG/LTG: Complies with medication therapy  8/5/2022 1013 by Kar Wang RN  Outcome: Resolved/Met  8/5/2022 0942 by Kar Wang RN  Outcome: Progressing Towards Goal  8/5/2022 0941 by Kar Wang RN  Outcome: Progressing Towards Goal  Goal: *STG/LTG: No longer expresses self destructive or suicidal thoughts  Outcome: Resolved/Met  Goal: *LTG:  Identifies available community resources  Outcome: Resolved/Met  Goal: *LTG:  Develops proactive suicide prevention plan  Outcome: Resolved/Met  Goal: Interventions  Outcome: Resolved/Met     Problem: Patient Education: Go to Patient Education Activity  Goal: Patient/Family Education  Outcome: Resolved/Met     Problem: Discharge Planning  Goal: *Discharge to safe environment  Outcome: Resolved/Met  Goal: *Knowledge of medication management  Outcome: Resolved/Met  Goal: *Knowledge of discharge instructions  Outcome: Resolved/Met     Problem: Patient Education: Go to Patient Education Activity  Goal: Patient/Family Education  Outcome: Resolved/Met     Problem: Infection - Risk of, Multi-drug Resistant Organism Colonization (MDRO)  Goal: *Absence of MDRO colonization  Outcome: Resolved/Met  Goal: *Absence of infection signs and symptoms  Outcome: Resolved/Met     Problem: Patient Education: Go to Patient Education Activity  Goal: Patient/Family Education  Outcome: Resolved/Met     Problem: Anxiety-Behavioral Health (Adult/Pediatric)  Goal: *STG: Participates in treatment plan  Outcome: Resolved/Met  Goal: *STG: Remains safe in hospital  Outcome: Resolved/Met  Goal: *STG: Seeks staff when feelings of anxiety and fear arise  Outcome: Resolved/Met  Goal: *STG: Attends activities and groups  Outcome: Resolved/Met  Goal: *STG: Demonstrates decrease in ritualistic behavior  Outcome: Resolved/Met  Goal: *STG: Demonstrates effective anxiety reduction strategies  Outcome: Resolved/Met  Goal: *STG/LTG: Trigger identification  Outcome: Resolved/Met  Goal: *STG/LTG: Complies with medication therapy  Outcome: Resolved/Met  Goal: *LTG:  Verbalizes understanding of personal adaptive level of functioning  Outcome: Resolved/Met  Goal: Interventions  Outcome: Resolved/Met     Problem: Patient Education: Go to Patient Education Activity  Goal: Patient/Family Education  Outcome: Resolved/Met     Problem: Depressed Mood (Adult/Pediatric)  Goal: *STG: Participates in treatment plan  Outcome: Resolved/Met  Goal: *STG: Participates in 1:1 therapy sessions  Outcome: Resolved/Met  Goal: *STG: Verbalizes anger, guilt, and other feelings in a constructive manor  Outcome: Resolved/Met  Goal: *STG: Attends activities and groups  Outcome: Resolved/Met  Goal: *STG: Demonstrates reduction in symptoms and increase in insight into coping skills/future focused  Outcome: Resolved/Met  Goal: *STG: Remains safe in hospital  Outcome: Resolved/Met  Goal: *STG: Complies with medication therapy  Outcome: Resolved/Met  Goal: *LTG: Returns to previous level of functioning and participates with after care plan  Outcome: Resolved/Met  Goal: *LTG: Understands illness and can identify signs of relapse  Outcome: Resolved/Met  Goal: Interventions  Outcome: Resolved/Met     Problem: Patient Education: Go to Patient Education Activity  Goal: Patient/Family Education  Outcome: Resolved/Met     Problem: Discharge Planning  Goal: *Discharge to safe environment  Outcome: Resolved/Met  Goal: *Knowledge of medication management  Outcome: Resolved/Met  Goal: *Knowledge of discharge instructions  Outcome: Resolved/Met

## 2022-08-05 NOTE — PROGRESS NOTES
Problem: Suicide  Goal: *STG: Remains safe in hospital  Outcome: Progressing Towards Goal  Goal: *STG: Seeks staff when feelings of self harm or harm towards others arise  Outcome: Progressing Towards Goal  Goal: *STG/LTG: Complies with medication therapy  Outcome: Progressing Towards Goal  Goal: *STG/LTG: No longer expresses self destructive or suicidal thoughts  Outcome: Progressing Towards Goal     [id number]: Assumed care of patient after receiving shift report from outgoing nurse. Patient was out and visible on unit, interacting appropriately with peers and staff. Affect is smiling, mood is anxious. Pt cooperative with vitals and assessment. A&O x 4. Independent in ADLs. Insight and concentration present. Gait is steady. Appetite patterns WNL. Patient received PRN nicotine lozenge at 2117. Patient reports feeling much better today and that her anxiety feels more manageable. Pt also notes how much stable housing post discharge has helped to alleviate some of her anxiety. Patient asked writer to reach out to  about some assistance  with job search before discharging. Will communicate to oncoming staff. Denies AVH/SI/HI. Patient received PRN tylenol, atarax, and trazodone for pain, anxiety, and sleep at approximately 2146. Patient medication and diet compliant. Pt encouraged to continue to participate in care. [id number]: Patient resting quietly in bed. No further issues noted at this time. [id number]: Pt has been observed throughout the night. Total hours slept approximately 8.5. No s/s of distress noted. Patient has remained safe. Hourly rounding performed throughout shift.

## 2022-08-05 NOTE — PROGRESS NOTES
Pt met in bedroom, remains withdrawn to room. Calm, cooperative, and pleasant. Denies SI/HI/AVH, depression, and anxiety. Cooperative with morning medications and vitals. Denies any further needs. Will continue to monitor.    Problem: Suicide  Goal: *STG: Remains safe in hospital  8/5/2022 0942 by Herlinda Taylor RN  Outcome: Progressing Towards Goal    Goal: *STG/LTG: Complies with medication therapy  8/5/2022 0942 by Herlinda Taylor RN  Outcome: Progressing Towards Goal

## 2022-08-05 NOTE — PROGRESS NOTES
Behavioral Services                                              Medicare Re-Certification    I certify that the inpatient psychiatric hospital services furnished since the previous certification/re-certification were, and continue to be, medically necessary for;    [x] (1) Treatment which could reasonably be expected to improve the patient's condition,    [x] (2) Or for diagnostic study. Estimated length of stay/service 1 day    Plan for post-hospital care per social work    This patient continues to need, on a daily basis, active treatment furnished directly by or requiring the supervision of inpatient psychiatric personnel.     Electronically signed by Meggan Peña MD on 8/5/2022 at 9:55 AM

## 2022-08-05 NOTE — BH NOTES
DISCHARGE SUMMARY    Socorro Ramos  : 1970  MRN: [id number]    The patient Parth Coreas exhibits the ability to control behavior in a less restrictive environment. Patient's level of functioning is improving. No assaultive/destructive behavior has been observed for the past 24 hours. No suicidal/homicidal threat or behavior has been observed for the past 24 hours. There is no evidence of serious medication side effects. Patient has not been in physical or protective restraints for at least the past 24 hours. If weapons involved, how are they secured? No access    Is patient aware of and in agreement with discharge plan? yes    Arrangements for medication:  Prescriptions given to patient, given a 30 day supply. Copy of discharge instructions to provider?:  yes    Arrangements for transportation home:  Roundtrip    Keep all follow up appointments as scheduled, continue to take prescribed medications per physician instructions.   Mental health crisis number:  870 or your local mental health crisis line number at [address] Emergency WARM LINE      5-638-886-MHAV ([phone])      M-F: 9am to 9pm      Sat & Sun: 5pm - 9pm  National suicide prevention lines:                             9-974-KVUYLCO (8-488-772-683-645-4841)       7-097-418-TALK ([phone])    Crisis Text Line:  Text HOME to 217020

## 2022-08-05 NOTE — PROGRESS NOTES
Discharge order received. Discharge instructions explained to and signed for by patient. All belongings returned, medications returned to patient. Calm and cooperative at time of discharge. Left via lyft.

## 2022-08-05 NOTE — H&P
[address] HISTORY AND PHYSICAL    Name:  Ashish Cary  MR#:  [id number]  :  1970  ACCOUNT #:  [de-identified]  ADMIT DATE:  2022    PSYCHIATRIC INTAKE NOTE    CHIEF COMPLAINT:  Depression. HISTORY OF PRESENT ILLNESS:  This is a 67-year-old  female with a history of depression who came to the hospital after complaining of bouts of suicide with plans to cut her wrists, where she has done that in the past and she has overdosed on medicines and she has not done any of that today. She was just feeling that way and before she got to that point, she came to the hospital to get help. She has no psychiatrist or therapist to treat her condition and no supports. She came in to get help. PAST PSYCHIATRIC HISTORY:  Depression as above. PAST MEDICAL HISTORY:  Hepatitis C, also had lumpectomy. ALLERGIES:  NONE KNOWN DRUG ALLERGIES. SOCIAL HISTORY:  Single. Smokes a pack of cigarettes a day, maybe drinks 1-2 beers per day. On methadone maintenance treatment and Suboxone historically for substance abuse. MENTAL STATUS EXAM:  Poor eye contact. Fair grooming. Decreased psychomotor activity. Spontaneous speech, but quiet. Irritable mood. Constricted affect and no perceptual disturbances. Denies suicidal ideations at that time. Cognition was grossly intact. Insight and judgment were poor. ASSESSMENT:  This is an adult female with substance abuse problems with mood disorder, here with an exacerbation of condition. DIAGNOSIS:  Unspecified psychosis. PLAN:  Admit for safety and stabilization, medication modification as needed, group therapy, individual therapy. ESTIMATED LENGTH OF STAY:  Five to seven days. DISPOSITION:  Planning with Social Work. STRENGTHS:  Willingness for treatment. DISABILITIES:  Substance use issues, limited support. ROWAN Morrow MD      [id number]  D:  2022 13:35  T:  2022 16:18  JOB #:  [id number]

## 2022-08-05 NOTE — GROUP NOTE
[facility] GROUP DOCUMENTATION INDIVIDUAL                                                                          Group Therapy Note    Date: 8/5/2022    Group Start Time: 0900  Group End Time: 1000  Group Topic: Topic Group    [facility] [place] [facility] ACUTE BEHAV [facility]    [place], [address] GROUP DOCUMENTATION GROUP    Group Therapy Note    Attendees: 10       Attendance: Did not attend

## 2022-08-05 NOTE — BH NOTES
Behavioral Health Transition Record to Provider    Patient Name: Euell Babinski  YOB: 1970  Medical Record Number: [id number]  Date of Admission: 7/29/2022  Date of Discharge: 8/5/2022    Attending Provider: Jarrod Marti MD  Discharging Provider: Jarrod Marti MD  To contact this individual call [phone] and ask the  to page. If unavailable, ask to be transferred to [facility] Provider on call. [facility] Provider will be available on call 24/7 and during holidays. Primary Care Provider: Chuck, MD Ike    No Known Allergies    Reason for Admission: Patient is a 49year old  female with a hx of polysubstance abuse who voluntary went to [facility] ER for [facility] admission due to SI and paranoia. Admission Diagnosis: Unspecified psychosis not due to a substance or known physiological condition ([facility] [address] [F29]    * No surgery found *    Results for orders placed or performed during the hospital encounter of 07/29/22   CULTURE, MRSA    Specimen: Nares; Nasal   Result Value Ref Range    Special Requests: NO SPECIAL REQUESTS      Culture result: MRSA NOT PRESENT      Culture result:        Screening of patient nares for MRSA is for surveillance purposes and, if positive, to facilitate isolation considerations in high risk settings. It is not intended for automatic decolonization interventions per se as regimens are not sufficiently effective to warrant routine use. Immunizations administered during this encounter: There is no immunization history on file for this patient. Screening for Metabolic Disorders for Patients on Antipsychotic Medications  (Data obtained from the EMR)    Estimated Body Mass Index  Estimated body mass index is 22.25 kg/m² as calculated from the following:    Height as of an earlier encounter on 7/29/22: 5' 4\" (1.626 m). Weight as of an earlier encounter on 7/29/22: 58.8 kg (129 lb 10.1 oz).      Vital Signs/Blood Pressure  Visit Vitals  BP 97/62   Pulse 60   Temp 97.6 °F (36.4 °C)   Resp 16   SpO2 99%       Blood Glucose/Hemoglobin A1c  Lab Results   Component Value Date/Time    Glucose 103 (H) 07/28/2022 09:26 PM    Glucose (POC) 130 (H) 02/24/2019 09:31 AM       No results found for: HBA1C, [id number]     Lipid Panel  Lab Results   Component Value Date/Time    Cholesterol, total 144 09/03/2019 08:27 AM    HDL Cholesterol 29 09/03/2019 08:27 AM    LDL, calculated 84.2 09/03/2019 08:27 AM    Triglyceride 154 (H) 09/03/2019 08:27 AM    CHOL/HDL Ratio 5.0 09/03/2019 08:27 AM        Discharge Diagnosis: Please refer to physician's summary     Discharge Plan:   The patient Jovana Canas exhibits the ability to control behavior in a less restrictive environment. Patient's level of functioning is improving. No assaultive/destructive behavior has been observed for the past 24 hours. No suicidal/homicidal threat or behavior has been observed for the past 24 hours. There is no evidence of serious medication side effects. Patient has not been in physical or protective restraints for at least the past 24 hours. If weapons involved, how are they secured? No access     Is patient aware of and in agreement with discharge plan? yes     Arrangements for medication:  Prescriptions given to patient, given a 30 day supply. Copy of discharge instructions to provider?:  yes     Arrangements for transportation home:  Roundtrip     Keep all follow up appointments as scheduled, continue to take prescribed medications per physician instructions.   Mental health crisis number:  543 or your local mental health crisis line number at [place] [address] Emergency WARM LINE      [phone]-MH ([phone])      M-F: 9am to 9pm      Sat & Sun: 5pm - 9pm  National suicide prevention lines:                             1-718-NJQEBOZ (8-747-314-630-739-4469)       9-048-180-TALK (7-468-713-370.530.9372)  24/7 Crisis Text Line:  Text HOME to 723042 Discharge Medication List and Instructions:   Current Discharge Medication List        START taking these medications    Details   escitalopram oxalate (LEXAPRO) 10 mg tablet Take 1 Tablet by mouth daily. Indications: major depressive disorder  Qty: 30 Tablet, Refills: 0  Start date: 8/6/2022      hydrOXYzine HCL (ATARAX) 50 mg tablet Take 1 Tablet by mouth three (3) times daily as needed for Anxiety for up to 10 days. Indications: anxious  Qty: 30 Tablet, Refills: 0  Start date: 8/5/2022, End date: 8/15/2022      traZODone (DESYREL) 50 mg tablet Take 1 Tablet by mouth nightly as needed for Sleep (For insomnia). Indications: insomnia associated with depression  Qty: 30 Tablet, Refills: 0  Start date: 8/5/2022           CONTINUE these medications which have NOT CHANGED    Details   buprenorphine-naloxone (Suboxone) 8-2 mg film sublingaul film Take 1 Film by SubLINGual route in the morning. Indications: dependence on opioid-type drugs             Unresulted Labs (24h ago, onward)      None          To obtain results of studies pending at discharge, please contact medical records [phone] option 4. Follow-up Information       Follow up With Specialties Details Why Contact Info    [facility]  Follow up today Transportation will be taking you to [address] [place] [address]. Please go to the front lobby to complete intake assessment. Address: [address] [place] [address] [address]    Phone: [phone]    Crisis Resources  Follow up  [address]:  24/7 hotline, 1 [phone] 69 [phone]  Warm Line:  Monday-Friday 9 AM-9 PM, Saturday-Sunday 5 PM- 9 PM  [phone]) [phone]  Crisis Text Line:  24/7 crisis text messaging. Text HOME to 270001. Substance Abuse 24Hour Hotl. ..     Other, MD [facility]   Patient can only remember the practice name and not the [address] [facility]  Follow up on 8/21/2022 Please follow up with Dr. Jovanna Pierce Ana on Sunday August 21st for medication management and suboxone refills. Dr. Ansari Members    [address] # [phone], [place], [address]    Phone: [phone] [address]  Call today Please call [phone] and schedule a follow up appointment Address: Galileo Reddy, [address]    Phone: [phone]    Employment Resources  Follow up  [address] [employer] [place] [employer]    Department of Human Resource Management  http://[employer].net/. [place].gov/    [place] [employer]  https://[employer].com/[employer]/career-services/    Employment Services Resource Guide. .. Social Security Application  Follow up  Georgi  Please visit social security website to fill out initial application to apply for disability            Advanced Directive:   Does the patient have an appointed surrogate decision maker? No  Does the patient have a Medical Advance Directive? No  Does the patient have a Psychiatric Advance Directive? No  If the patient does not have a surrogate or Medical Advance Directive AND Psychiatric Advance Directive, the patient was offered information on these advance directives Patient declined to complete    Patient Instructions: Please continue all medications until otherwise directed by physician. Tobacco Cessation Discharge Plan:   Is the patient a smoker and needs referral for smoking cessation? Refused  Patient referred to the following for smoking cessation with an appointment? Refused     Patient was offered medication to assist with smoking cessation at discharge? Refused  Was education for smoking cessation added to the discharge instructions? Refused    Alcohol/Substance Abuse Discharge Plan:   Does the patient have a history of substance/alcohol abuse and requires a referral for treatment? Yes  Patient referred to the following for substance/alcohol abuse treatment with an appointment? Yes  Patient was offered medication to assist with alcohol cessation at discharge? Yes  Was education for substance/alcohol abuse added to discharge instructions? Yes    Patient discharged to Home; provided to the patient/caregiver either in hard copy or electronically.

## 2023-04-08 ENCOUNTER — HOSPITAL ENCOUNTER (INPATIENT)
Age: 53
LOS: 6 days | Discharge: HOME | DRG: 750 | End: 2023-04-14
Attending: STUDENT IN AN ORGANIZED HEALTH CARE EDUCATION/TRAINING PROGRAM | Admitting: PSYCHIATRY & NEUROLOGY
Payer: MEDICAID

## 2023-04-08 DIAGNOSIS — F23 BRIEF PSYCHOTIC DISORDER (HCC): ICD-10-CM

## 2023-04-08 DIAGNOSIS — F48.9 MENTAL HEALTH PROBLEM: Primary | ICD-10-CM

## 2023-04-08 DIAGNOSIS — F11.10 OPIOID USE DISORDER, MILD, ABUSE (HCC): ICD-10-CM

## 2023-04-08 DIAGNOSIS — F19.10 POLYSUBSTANCE ABUSE (HCC): ICD-10-CM

## 2023-04-08 DIAGNOSIS — F14.20 COCAINE USE DISORDER, MODERATE, DEPENDENCE (HCC): ICD-10-CM

## 2023-04-08 PROBLEM — F29 PSYCHOSIS (HCC): Status: ACTIVE | Noted: 2023-04-08

## 2023-04-08 LAB
ALBUMIN SERPL-MCNC: 4.4 G/DL (ref 3.5–5)
ALBUMIN/GLOB SERPL: 1.1 (ref 1.1–2.2)
ALP SERPL-CCNC: 114 U/L (ref 45–117)
ALT SERPL-CCNC: 21 U/L (ref 12–78)
AMPHET UR QL SCN: POSITIVE
ANION GAP SERPL CALC-SCNC: 13 MMOL/L (ref 5–15)
APPEARANCE UR: CLEAR
AST SERPL-CCNC: 17 U/L (ref 15–37)
BACTERIA URNS QL MICRO: NEGATIVE /HPF
BARBITURATES UR QL SCN: NEGATIVE
BASOPHILS # BLD: 0.1 K/UL (ref 0–0.1)
BASOPHILS NFR BLD: 1 % (ref 0–1)
BENZODIAZ UR QL: NEGATIVE
BILIRUB SERPL-MCNC: 0.7 MG/DL (ref 0.2–1)
BILIRUB UR QL: NEGATIVE
BUN SERPL-MCNC: 9 MG/DL (ref 6–20)
BUN/CREAT SERPL: 10 (ref 12–20)
CALCIUM SERPL-MCNC: 9.6 MG/DL (ref 8.5–10.1)
CANNABINOIDS UR QL SCN: NEGATIVE
CHLORIDE SERPL-SCNC: 100 MMOL/L (ref 97–108)
CO2 SERPL-SCNC: 25 MMOL/L (ref 21–32)
COCAINE UR QL SCN: POSITIVE
COLOR UR: ABNORMAL
CREAT SERPL-MCNC: 0.87 MG/DL (ref 0.55–1.02)
DIFFERENTIAL METHOD BLD: NORMAL
DRUG SCRN COMMENT,DRGCM: ABNORMAL
EOSINOPHIL # BLD: 0.1 K/UL (ref 0–0.4)
EOSINOPHIL NFR BLD: 1 % (ref 0–7)
EPITH CASTS URNS QL MICRO: ABNORMAL /LPF
ERYTHROCYTE [DISTWIDTH] IN BLOOD BY AUTOMATED COUNT: 12.9 % (ref 11.5–14.5)
ETHANOL SERPL-MCNC: <10 MG/DL
FLUAV RNA SPEC QL NAA+PROBE: NOT DETECTED
FLUBV RNA SPEC QL NAA+PROBE: NOT DETECTED
GLOBULIN SER CALC-MCNC: 3.9 G/DL (ref 2–4)
GLUCOSE SERPL-MCNC: 106 MG/DL (ref 65–100)
GLUCOSE UR STRIP.AUTO-MCNC: NEGATIVE MG/DL
HCT VFR BLD AUTO: 42.6 % (ref 35–47)
HGB BLD-MCNC: 14.7 G/DL (ref 11.5–16)
HGB UR QL STRIP: ABNORMAL
IMM GRANULOCYTES # BLD AUTO: 0 K/UL (ref 0–0.04)
IMM GRANULOCYTES NFR BLD AUTO: 0 % (ref 0–0.5)
KETONES UR QL STRIP.AUTO: NEGATIVE MG/DL
LEUKOCYTE ESTERASE UR QL STRIP.AUTO: ABNORMAL
LYMPHOCYTES # BLD: 1.3 K/UL (ref 0.8–3.5)
LYMPHOCYTES NFR BLD: 17 % (ref 12–49)
MCH RBC QN AUTO: 30.6 PG (ref 26–34)
MCHC RBC AUTO-ENTMCNC: 34.5 G/DL (ref 30–36.5)
MCV RBC AUTO: 88.8 FL (ref 80–99)
METHADONE UR QL: NEGATIVE
MONOCYTES # BLD: 0.5 K/UL (ref 0–1)
MONOCYTES NFR BLD: 7 % (ref 5–13)
NEUTS SEG # BLD: 5.6 K/UL (ref 1.8–8)
NEUTS SEG NFR BLD: 74 % (ref 32–75)
NITRITE UR QL STRIP.AUTO: NEGATIVE
NRBC # BLD: 0 K/UL (ref 0–0.01)
NRBC BLD-RTO: 0 PER 100 WBC
OPIATES UR QL: NEGATIVE
PCP UR QL: NEGATIVE
PH UR STRIP: 7.5 (ref 5–8)
PLATELET # BLD AUTO: 261 K/UL (ref 150–400)
PMV BLD AUTO: 10.8 FL (ref 8.9–12.9)
POTASSIUM SERPL-SCNC: 3.5 MMOL/L (ref 3.5–5.1)
PROT SERPL-MCNC: 8.3 G/DL (ref 6.4–8.2)
PROT UR STRIP-MCNC: NEGATIVE MG/DL
RBC # BLD AUTO: 4.8 M/UL (ref 3.8–5.2)
RBC #/AREA URNS HPF: ABNORMAL /HPF (ref 0–5)
SARS-COV-2 RNA RESP QL NAA+PROBE: NOT DETECTED
SODIUM SERPL-SCNC: 138 MMOL/L (ref 136–145)
SP GR UR REFRACTOMETRY: <1.005
UA: UC IF INDICATED,UAUC: ABNORMAL
UROBILINOGEN UR QL STRIP.AUTO: 1 EU/DL (ref 0.2–1)
WBC # BLD AUTO: 7.5 K/UL (ref 3.6–11)
WBC URNS QL MICRO: ABNORMAL /HPF (ref 0–4)

## 2023-04-08 PROCEDURE — 74011250636 HC RX REV CODE- 250/636: Performed by: NURSE PRACTITIONER

## 2023-04-08 PROCEDURE — 80053 COMPREHEN METABOLIC PANEL: CPT

## 2023-04-08 PROCEDURE — 80307 DRUG TEST PRSMV CHEM ANLYZR: CPT

## 2023-04-08 PROCEDURE — 99285 EMERGENCY DEPT VISIT HI MDM: CPT

## 2023-04-08 PROCEDURE — 82077 ASSAY SPEC XCP UR&BREATH IA: CPT

## 2023-04-08 PROCEDURE — 36600 WITHDRAWAL OF ARTERIAL BLOOD: CPT

## 2023-04-08 PROCEDURE — 65220000003 HC RM SEMIPRIVATE PSYCH

## 2023-04-08 PROCEDURE — 85025 COMPLETE CBC W/AUTO DIFF WBC: CPT

## 2023-04-08 PROCEDURE — 81001 URINALYSIS AUTO W/SCOPE: CPT

## 2023-04-08 PROCEDURE — 87636 SARSCOV2 & INF A&B AMP PRB: CPT

## 2023-04-08 PROCEDURE — 93005 ELECTROCARDIOGRAM TRACING: CPT

## 2023-04-08 PROCEDURE — 36415 COLL VENOUS BLD VENIPUNCTURE: CPT

## 2023-04-08 RX ORDER — TRAZODONE HYDROCHLORIDE 50 MG/1
50 TABLET ORAL
Status: DISCONTINUED | OUTPATIENT
Start: 2023-04-08 | End: 2023-04-14 | Stop reason: HOSPADM

## 2023-04-08 RX ORDER — DM/P-EPHED/ACETAMINOPH/DOXYLAM 30-7.5/3
2 LIQUID (ML) ORAL
Status: DISCONTINUED | OUTPATIENT
Start: 2023-04-08 | End: 2023-04-14 | Stop reason: HOSPADM

## 2023-04-08 RX ORDER — IBUPROFEN 200 MG
1 TABLET ORAL DAILY
Status: DISCONTINUED | OUTPATIENT
Start: 2023-04-09 | End: 2023-04-10

## 2023-04-08 RX ORDER — LORAZEPAM 2 MG/ML
1 INJECTION INTRAMUSCULAR
Status: DISCONTINUED | OUTPATIENT
Start: 2023-04-08 | End: 2023-04-14 | Stop reason: HOSPADM

## 2023-04-08 RX ORDER — OLANZAPINE 5 MG/1
5 TABLET ORAL
Status: DISCONTINUED | OUTPATIENT
Start: 2023-04-08 | End: 2023-04-14 | Stop reason: HOSPADM

## 2023-04-08 RX ORDER — HALOPERIDOL 5 MG/ML
5 INJECTION INTRAMUSCULAR
Status: DISCONTINUED | OUTPATIENT
Start: 2023-04-08 | End: 2023-04-14 | Stop reason: HOSPADM

## 2023-04-08 RX ORDER — BENZTROPINE MESYLATE 1 MG/1
1 TABLET ORAL
Status: DISCONTINUED | OUTPATIENT
Start: 2023-04-08 | End: 2023-04-14 | Stop reason: HOSPADM

## 2023-04-08 RX ORDER — DIPHENHYDRAMINE HYDROCHLORIDE 50 MG/ML
50 INJECTION, SOLUTION INTRAMUSCULAR; INTRAVENOUS
Status: DISCONTINUED | OUTPATIENT
Start: 2023-04-08 | End: 2023-04-14 | Stop reason: HOSPADM

## 2023-04-08 RX ORDER — HYDROXYZINE 25 MG/1
50 TABLET, FILM COATED ORAL
Status: DISCONTINUED | OUTPATIENT
Start: 2023-04-08 | End: 2023-04-14 | Stop reason: HOSPADM

## 2023-04-08 RX ORDER — HALOPERIDOL 5 MG/ML
5 INJECTION INTRAMUSCULAR
Status: DISCONTINUED | OUTPATIENT
Start: 2023-04-08 | End: 2023-04-09 | Stop reason: HOSPADM

## 2023-04-08 RX ORDER — ADHESIVE BANDAGE
30 BANDAGE TOPICAL DAILY PRN
Status: DISCONTINUED | OUTPATIENT
Start: 2023-04-08 | End: 2023-04-14 | Stop reason: HOSPADM

## 2023-04-08 RX ORDER — ACETAMINOPHEN 325 MG/1
650 TABLET ORAL
Status: DISCONTINUED | OUTPATIENT
Start: 2023-04-08 | End: 2023-04-14 | Stop reason: HOSPADM

## 2023-04-08 RX ORDER — LORAZEPAM 1 MG/1
1 TABLET ORAL
Status: ACTIVE | OUTPATIENT
Start: 2023-04-08 | End: 2023-04-09

## 2023-04-08 RX ADMIN — DIPHENHYDRAMINE HYDROCHLORIDE 50 MG: 50 INJECTION, SOLUTION INTRAMUSCULAR; INTRAVENOUS at 21:18

## 2023-04-08 RX ADMIN — HALOPERIDOL LACTATE 5 MG: 5 INJECTION, SOLUTION INTRAMUSCULAR at 21:18

## 2023-04-08 RX ADMIN — LORAZEPAM 1 MG: 2 INJECTION INTRAMUSCULAR; INTRAVENOUS at 21:19

## 2023-04-09 PROCEDURE — 65220000003 HC RM SEMIPRIVATE PSYCH

## 2023-04-09 PROCEDURE — 74011250637 HC RX REV CODE- 250/637: Performed by: NURSE PRACTITIONER

## 2023-04-09 RX ADMIN — Medication 2 MG: at 15:36

## 2023-04-09 RX ADMIN — OLANZAPINE 5 MG: 5 TABLET, FILM COATED ORAL at 22:20

## 2023-04-09 RX ADMIN — TRAZODONE HYDROCHLORIDE 50 MG: 50 TABLET ORAL at 22:20

## 2023-04-10 PROBLEM — F11.10 OPIOID USE DISORDER, MILD, ABUSE (HCC): Status: ACTIVE | Noted: 2023-04-10

## 2023-04-10 PROBLEM — F14.20 COCAINE USE DISORDER, MODERATE, DEPENDENCE (HCC): Status: ACTIVE | Noted: 2023-04-10

## 2023-04-10 LAB
ATRIAL RATE: 81 BPM
CALCULATED P AXIS, ECG09: 56 DEGREES
CALCULATED R AXIS, ECG10: 52 DEGREES
CALCULATED T AXIS, ECG11: 50 DEGREES
DIAGNOSIS, 93000: NORMAL
P-R INTERVAL, ECG05: 152 MS
Q-T INTERVAL, ECG07: 380 MS
QRS DURATION, ECG06: 76 MS
QTC CALCULATION (BEZET), ECG08: 441 MS
VENTRICULAR RATE, ECG03: 81 BPM

## 2023-04-10 PROCEDURE — 74011636637 HC RX REV CODE- 636/637: Performed by: PSYCHIATRY & NEUROLOGY

## 2023-04-10 PROCEDURE — 74011250637 HC RX REV CODE- 250/637: Performed by: NURSE PRACTITIONER

## 2023-04-10 PROCEDURE — 74011250637 HC RX REV CODE- 250/637: Performed by: PSYCHIATRY & NEUROLOGY

## 2023-04-10 PROCEDURE — 99233 SBSQ HOSP IP/OBS HIGH 50: CPT | Performed by: PSYCHIATRY & NEUROLOGY

## 2023-04-10 PROCEDURE — 65220000003 HC RM SEMIPRIVATE PSYCH

## 2023-04-10 RX ORDER — BUPRENORPHINE HYDROCHLORIDE AND NALOXONE HYDROCHLORIDE DIHYDRATE 2; .5 MG/1; MG/1
2 TABLET SUBLINGUAL DAILY
Status: DISCONTINUED | OUTPATIENT
Start: 2023-04-10 | End: 2023-04-11

## 2023-04-10 RX ORDER — CYCLOBENZAPRINE HCL 10 MG
5 TABLET ORAL 3 TIMES DAILY
Status: DISCONTINUED | OUTPATIENT
Start: 2023-04-10 | End: 2023-04-12

## 2023-04-10 RX ORDER — NALOXONE HYDROCHLORIDE 0.4 MG/ML
0.4 INJECTION, SOLUTION INTRAMUSCULAR; INTRAVENOUS; SUBCUTANEOUS
Status: DISCONTINUED | OUTPATIENT
Start: 2023-04-10 | End: 2023-04-14 | Stop reason: HOSPADM

## 2023-04-10 RX ADMIN — BENZTROPINE MESYLATE 1 MG: 1 TABLET ORAL at 12:00

## 2023-04-10 RX ADMIN — ACETAMINOPHEN 650 MG: 325 TABLET ORAL at 20:16

## 2023-04-10 RX ADMIN — BUPRENORPHINE AND NALOXONE 2 TABLET: 2; .5 TABLET SUBLINGUAL at 11:23

## 2023-04-10 RX ADMIN — TRAZODONE HYDROCHLORIDE 50 MG: 50 TABLET ORAL at 21:43

## 2023-04-10 RX ADMIN — CYCLOBENZAPRINE 5 MG: 10 TABLET, FILM COATED ORAL at 12:30

## 2023-04-10 RX ADMIN — OLANZAPINE 5 MG: 5 TABLET, FILM COATED ORAL at 21:43

## 2023-04-10 RX ADMIN — CYCLOBENZAPRINE 5 MG: 10 TABLET, FILM COATED ORAL at 17:28

## 2023-04-10 NOTE — BH NOTES
Collateral:     This writer spoke with friend, Ammy Swanson. Ammy Swanson reported that patient has been using heroin and crack for many years, contributing to extreme paranoia and reported that she believes people are out to get her. Ammy Swanson reported that patient believes that people are seeing her through the TV. Ammy Sky reported that  Ammy Swanson reported that patient has been to [facility] for substance use treatment in the past. Ammy Swanson reported that he believes going to treatment again is worth another try. Ammy Swanson reported that patient is able to return back to his residence if necessary, but he is looking for patient find her own place in the near future.     INESSA Heredia, Supervisee in social work

## 2023-04-10 NOTE — PROGRESS NOTES
Writer met patient  withdrawn to the room,  alert and oriented x 3, communicated appropriately. Pt appear irritable and sad, refusing care. On assessment, endorse depression at 10/10, denied anxiety, SI, HI, and AVH. PRN Zyprexa and trazodone administered. Pt complied. Pt is tearful when approached. Vital signs entered. Rounding in progress. Pt slept for total of  7:15 hours.

## 2023-04-10 NOTE — GROUP NOTE
[facility] GROUP DOCUMENTATION INDIVIDUAL                                                                          Group Therapy Note    Date: 4/10/2023    Group Start Time: 1000  Group End Time: 1100  Group Topic: Topic Group    [facility] [place] [facility] ACUTE BEHAV TH    [address] [facility] GROUP DOCUMENTATION GROUP    Group Therapy Note    Attendees: 8       Attendance: Did not attend

## 2023-04-10 NOTE — H&P
[address] [facility] HISTORY AND PHYSICAL    Name:  Asael Carvalho  MR#:  [id number]  :  1970  ACCOUNT #:  [de-identified]  ADMIT DATE:  2023    INITIAL PSYCHIATRIC INTERVIEW    CHIEF COMPLAINT:  \"I will not talk to you. \"    HISTORY OF PRESENT ILLNESS:  The patient is a 41-year-old  female who is currently admitted on a TDO. She was brought into the ER in restraints for agitation. I attempted to interview the patient, but as soon as I entered her room, she started loudly saying \"I do not want to talk\" and declined answering questions. There was no further interaction possible with her. After arriving on the unit, the patient was quite agitated, aggressive with nursing staff and shouting. She has to be given IM p.r.n. to help her calm down, and she had been in her room since then. No new information available from the patient herself, and providing to the most in the ER, she was brought in with a history of being paranoid about her identity being stolen and had stopped taking medications many months ago because she was concerned that her medications were being poisoned. There is a history of her being on Lexapro and Suboxone after she was hospitalized in our facility last year in August.  She has been refusing to take any medications since her arrival on the unit. Reportedly, she used crack cocaine two days ago and used heroin. Her urine drug screen is positive for amphetamines and cocaine, but not for opiates. The patient could not add anything to her history.     PAST MEDICAL HISTORY:  Reviewed as per the history and physical exam.      Past Medical History:   Diagnosis Date    Hepatitis C infection     Smoker      Prior to Admission medications    Not on File     Vitals:    23 1400 23 0541 23 1926   BP: (!) 145/93 102/62 (!) 99/59   Pulse: 91 62 83   Resp: 18 16 20   Temp: 98.7 °F (37.1 °C) 98 °F (36.7 °C) 98.1 °F (36.7 °C)   SpO2: 97% 99% 99% Weight: 68 kg (150 lb)     Height: 5' 4\" (1.626 m)       Lab Results   Component Value Date/Time    WBC 7.5 04/08/2023 02:25 PM    HGB 14.7 04/08/2023 02:25 PM    HCT 42.6 04/08/2023 02:25 PM    PLATELET 488 36/09/0358 02:25 PM    MCV 88.8 04/08/2023 02:25 PM     Lab Results   Component Value Date/Time    Sodium 138 04/08/2023 02:25 PM    Potassium 3.5 04/08/2023 02:25 PM    Chloride 100 04/08/2023 02:25 PM    CO2 25 04/08/2023 02:25 PM    Anion gap 13 04/08/2023 02:25 PM    Glucose 106 (H) 04/08/2023 02:25 PM    BUN 9 04/08/2023 02:25 PM    Creatinine 0.87 04/08/2023 02:25 PM    BUN/Creatinine ratio 10 (L) 04/08/2023 02:25 PM    GFR est AA >60 07/28/2022 09:26 PM    GFR est non-AA >60 07/28/2022 09:26 PM    Calcium 9.6 04/08/2023 02:25 PM    Bilirubin, total 0.7 04/08/2023 02:25 PM    Alk. phosphatase 114 04/08/2023 02:25 PM    Protein, total 8.3 (H) 04/08/2023 02:25 PM    Albumin 4.4 04/08/2023 02:25 PM    Globulin 3.9 04/08/2023 02:25 PM    A-G Ratio 1.1 04/08/2023 02:25 PM    ALT (SGPT) 21 04/08/2023 02:25 PM    AST (SGOT) 17 04/08/2023 02:25 PM     No results found for: VALF2, VALAC, VALP, VALPR, DS6, CRBAM, CRBAMP, CARB2, XCRBAM  No results found for: LITHM  RADIOLOGY REPORTS:(reviewed/updated 4/10/2023)  No results found. Lab Results   Component Value Date/Time    Pregnancy test,urine (POC) NEGATIVE 02/20/2019 06:59 AM         PAST PSYCHIATRIC HISTORY:  As noted above, the patient was hospitalized at [facility] in 08/2022 and had been on Lexapro and Suboxone after this. There is no further history available. PSYCHOSOCIAL HISTORY:  Not available and the patient declined to respond to any of my questions. MENTAL STATUS EXAMINATION:  The patient is a middle-aged  female who is dressed in hospital apparel. She is lying in bed with her face covered with a sheet. Her speech is spontaneous and coherent, but minimal in output.   As noted above, she declined to answer any of my questions and a formal mental status exam was not possible. ASSESSMENT AND PLAN/DIAGNOSIS:  Unspecified psychosis. R/O Opiate and Cocaine use disorder      Continue inpatient stay for the safety and optimum care of the patient   Routine labs to be ordered as needed. Psychotropic medications will be ordered and adjusted as needed. Patients status is TDO   Supportive, milieu and group therapy. Continue rest of the medications as needed. Strengths include ability to seek help and   Estimated length of stay is 5-7 days.          Elba Haddad MD      [id number]  D:  04/09/2023 14:49  T:  04/09/2023 19:37  JOB #:  [id number]

## 2023-04-10 NOTE — BH NOTES
PSYCHIATRIC PROGRESS NOTE         Patient Name  Frank Schlatter   Date of Birth 1970   [facility] [id number]   Medical Record Number  [id number]      Age  47 y.o. PCP Other, MD Ike   Admit date:  4/8/2023    Room Number  518/57  @ [facility]   Date of Service  4/10/2023         E & M PROGRESS NOTE:         HISTORY       CC:  \"psychosis\"  HISTORY OF PRESENT ILLNESS/INTERVAL HISTORY:  (reviewed/updated 4/10/2023). per initial evaluation: The patient is a 27-year-old  female who is currently admitted on a TDO. She was brought into the ER in restraints for agitation. I attempted to interview the patient, but as soon as I entered her room, she started loudly saying \"I do not want to talk\" and declined answering questions. There was no further interaction possible with her. After arriving on the unit, the patient was quite agitated, aggressive with nursing staff and shouting. She has to be given IM p.r.n. to help her calm down, and she had been in her room since then. No new information available from the patient herself, and providing to the most in the ER, she was brought in with a history of being paranoid about her identity being stolen and had stopped taking medications many months ago because she was concerned that her medications were being poisoned. There is a history of her being on Lexapro and Suboxone after she was hospitalized in our facility last year in August.  She has been refusing to take any medications since her arrival on the unit. Reportedly, she used crack cocaine two days ago and used heroin. Her urine drug screen is positive for amphetamines and cocaine, but not for opiates. The patient could not add anything to her history. 04/10 - the patient has been visible; she slept 7.5 hours overnight and today has been describing ongoing safety concerns from her ex .  She is oriented and organized on interview but reports significant anxiety from fears of her medication being 'switched.' Patient contracts for safety, she is in moderate distress during interview but able to make her needs known. She denies SI/HI but endorses vague AVH. She acknowledges having been prescribed antipsychotics in the past, which she took twice a day but did not remember the name of the agent. Patient ill appearing, agreed to restart Suboxone today and reassess after her TDO hearing. SIDE EFFECTS: (reviewed/updated 4/10/2023)  None reported or admitted to. ALLERGIES:(reviewed/updated 4/10/2023)  No Known Allergies   REVIEW OF SYSTEMS: (reviewed/updated 4/10/2023)  Appetite:no change from normal   Sleep: poor with DIMS (difficulty initiating & maintaining sleep)   All other Review of Systems: Negative except withdrawal symptoms per HPI         [facility] (MSE):    MSE FINDINGS ARE WITHIN NORMAL LIMITS (WNL) UNLESS OTHERWISE STATED BELOW. ( ALL OF THE BELOW CATEGORIES OF THE MSE HAVE BEEN REVIEWED (reviewed 4/10/2023) AND UPDATED AS DEEMED APPROPRIATE )  General Presentation age appropriate, cooperative   Orientation oriented to time, place and person   Vital Signs  See below (reviewed 4/10/2023); Vital Signs (BP, Pulse, & Temp) are within normal limits if not listed below.    Gait and Station Stable/steady, no ataxia   Musculoskeletal System No extrapyramidal symptoms (EPS); no abnormal muscular movements or Tardive Dyskinesia (TD); muscle strength and tone are within normal limits   Language No aphasia or dysarthria   Speech:  normal volume and non-pressured   Thought Processes Coherent normal rate of thoughts; fair abstract reasoning/computation   Thought Associations goal directed   Thought Content paranoid delusions and visual hallucinations   Suicidal Ideations contracts for safety   Homicidal Ideations contracts for safety   Mood:  anxious  and depressed   Affect:  constricted   Memory recent  impaired   Memory remote:  fair Concentration/Attention:  intact   Fund of Knowledge average   Insight:  limited   Reliability fair   Judgment:  poor          VITALS:     Patient Vitals for the past 24 hrs:   Temp Pulse Resp BP SpO2   04/09/23 1926 98.1 °F (36.7 °C) 83 20 (!) 99/59 99 %     Wt Readings from Last 3 Encounters:   04/08/23 68 kg (150 lb)   07/29/22 58.8 kg (129 lb 10.1 oz)   09/07/19 59.8 kg (131 lb 13.4 oz)     Temp Readings from Last 3 Encounters:   04/09/23 98.1 °F (36.7 °C)   08/05/22 97.6 °F (36.4 °C)   07/29/22 97.6 °F (36.4 °C)     BP Readings from Last 3 Encounters:   04/09/23 (!) 99/59   08/05/22 97/62   07/29/22 97/60     Pulse Readings from Last 3 Encounters:   04/09/23 83   08/05/22 60   07/29/22 (!) 51            DATA     LABORATORY DATA:(reviewed/updated 4/10/2023)  No results found for this or any previous visit (from the past 24 hour(s)). No results found for: VALF2, VALAC, VALP, VALPR, DS6, CRBAM, CRBAMP, CARB2, XCRBAM  No results found for: LITHM   RADIOLOGY REPORTS:(reviewed/updated 4/10/2023)  No results found.        MEDICATIONS     ALL MEDICATIONS:   Current Facility-Administered Medications   Medication Dose Route Frequency    OLANZapine (ZyPREXA) tablet 5 mg  5 mg Oral Q6H PRN    haloperidol lactate (HALDOL) injection 5 mg  5 mg IntraMUSCular Q6H PRN    benztropine (COGENTIN) tablet 1 mg  1 mg Oral BID PRN    diphenhydrAMINE (BENADRYL) injection 50 mg  50 mg IntraMUSCular BID PRN    hydrOXYzine HCL (ATARAX) tablet 50 mg  50 mg Oral TID PRN    LORazepam (ATIVAN) injection 1 mg  1 mg IntraMUSCular Q4H PRN    traZODone (DESYREL) tablet 50 mg  50 mg Oral QHS PRN    acetaminophen (TYLENOL) tablet 650 mg  650 mg Oral Q4H PRN    magnesium hydroxide (MILK OF MAGNESIA) 400 mg/5 mL oral suspension 30 mL  30 mL Oral DAILY PRN    nicotine buccal (POLACRILEX) lozenge 2 mg  2 mg Oral Q2H PRN      SCHEDULED MEDICATIONS:   Current Facility-Administered Medications   Medication Dose Route Frequency          ASSESSMENT & PLAN DIAGNOSES REQUIRING ACTIVE TREATMENT AND MONITORING: (reviewed/updated 4/10/2023)  Patient Active Hospital Problem List:       Psychosis [facility]) (4/8/2023)           Assessment: the patient presents with ongoing paranoid ideation in the setting of stimulant abuse. She has been in moderate distress from polysubstance withdrawal but also continues to endorse symptoms of psychosis. Will observe for now, treat withdrawal, consider AP if she does not resolve off substances. Plan:   - START Suboxone 4 mg SL QDAY for opiate withdrawal, cravings  - START Flexeril 5 mg TID for myalgias  - Consider antipsychotic for ongoing PI  - Consider Remeron 15 mg QHS for depressed mood, stimulant cravings  - IGM therapy as tolerated  - Dispo planning (home when stable)    In summary, Jack Rivera, is a 47 y.o.  female who presents with a severe exacerbation of the principal diagnosis of <principal problem not specified>    Patient's condition is improving. Patient requires continued inpatient hospitalization for further stabilization, safety monitoring and medication management. I will continue to coordinate the provision of individual, milieu, occupational, group, and substance abuse therapies to address target symptoms/diagnoses as deemed appropriate for the individual patient. A coordinated, multidisplinary treatment team round was conducted with the patient (this team consists of the nurse, psychiatric unit pharmacist,  and writer). Complete current electronic health record for patient has been reviewed today including consultant notes, ancillary staff notes, nurses and psychiatric tech notes. Suicide risk assessment completed and patient deemed to be of moderate risk for suicide at this time. The following regarding medications was addressed during rounds with patient:   the risks and benefits of the proposed medication. The patient was given the opportunity to ask questions.  Informed consent given to the use of the above medications. Will continue to adjust psychiatric and non-psychiatric medications (see above \"medication\" section and orders section for details) as deemed appropriate & based upon diagnoses and response to treatment. I will continue to order blood tests/labs and diagnostic tests as deemed appropriate and review results as they become available (see orders for details and above listed lab/test results). I will order psychiatric records from previous [facility] hospitals to further elucidate the nature of patient's psychopathology and review once available. I will gather additional collateral information from friends, family and o/p treatment team to further elucidate the nature of patient's psychopathology and baselline level of psychiatric functioning. I certify that this patient's inpatient psychiatric hospital services furnished since the previous certification were, and continue to be, required for treatment that could reasonably be expected to improve the patient's condition, or for diagnostic study, and that the patient continues to need, on a daily basis, active treatment furnished directly by or requiring the supervision of inpatient psychiatric facility personnel. In addition, the hospital records show that services furnished were intensive treatment services, admission or related services, or equivalent services.     EXPECTED DISCHARGE DATE/DAY: TBD     DISPOSITION: Home       Signed By:   Lizette Carr MD  4/10/2023

## 2023-04-10 NOTE — GROUP NOTE
[facility]  GROUP DOCUMENTATION INDIVIDUAL                                                                          Group Therapy Note    Date: 4/10/2023    Group Start Time: 1400  Group End Time: 1500  Group Topic: Recreational/Music Therapy    [address] [facility] 3 ACUTE BEHAV [facility]    [facility] GROUP    Group Therapy Note    Attendees: 5       Attendance: Did not attend      [place]
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
## 2023-04-10 NOTE — PROGRESS NOTES
Problem: Depressed Mood (Adult/Pediatric)  Goal: *STG: Participates in treatment plan  Outcome: Progressing Towards Goal  Goal: *STG: Remains safe in hospital  Outcome: Progressing Towards Goal  Goal: *STG: Complies with medication therapy  Outcome: Progressing Towards Goal     Problem: Falls - Risk of  Goal: *Absence of Falls  Description: Document Nimco Fall Risk and appropriate interventions in the flowsheet. Outcome: Progressing Towards Goal  Note: Fall Risk Interventions:  Medication Interventions: Teach patient to arise slowly      Patient received resting in her room. Denies SI/HI/AVH, endorses depression and anxiety levels of 10. Cooperative, irritable, guarded, isolative to her room, not attending groups. Patient complained of her mouth getting stiff. Cogentin administered. Patient later was restless and complained about her legs hurting. Flexeril prescribed and administered. Patient now appears calmer, no leg movement. Taking medications as prescribed. Will continue to monitor for safety.

## 2023-04-11 LAB
SARS-COV-2 RNA RESP QL NAA+PROBE: NOT DETECTED
SOURCE, COVRS: NORMAL

## 2023-04-11 PROCEDURE — U0005 INFEC AGEN DETEC AMPLI PROBE: HCPCS

## 2023-04-11 PROCEDURE — 65220000003 HC RM SEMIPRIVATE PSYCH

## 2023-04-11 PROCEDURE — 74011636637 HC RX REV CODE- 636/637: Performed by: PSYCHIATRY & NEUROLOGY

## 2023-04-11 PROCEDURE — 74011250637 HC RX REV CODE- 250/637: Performed by: PSYCHIATRY & NEUROLOGY

## 2023-04-11 PROCEDURE — 74011250636 HC RX REV CODE- 250/636

## 2023-04-11 PROCEDURE — 99232 SBSQ HOSP IP/OBS MODERATE 35: CPT | Performed by: PSYCHIATRY & NEUROLOGY

## 2023-04-11 PROCEDURE — 74011250637 HC RX REV CODE- 250/637

## 2023-04-11 RX ORDER — RISPERIDONE 1 MG/1
1 TABLET, FILM COATED ORAL EVERY 12 HOURS
Status: DISCONTINUED | OUTPATIENT
Start: 2023-04-11 | End: 2023-04-14

## 2023-04-11 RX ORDER — METHOCARBAMOL 500 MG/1
750 TABLET, FILM COATED ORAL
Status: DISCONTINUED | OUTPATIENT
Start: 2023-04-11 | End: 2023-04-12

## 2023-04-11 RX ORDER — MIRTAZAPINE 15 MG/1
15 TABLET, FILM COATED ORAL
Status: DISCONTINUED | OUTPATIENT
Start: 2023-04-11 | End: 2023-04-14 | Stop reason: HOSPADM

## 2023-04-11 RX ORDER — BUPRENORPHINE HYDROCHLORIDE AND NALOXONE HYDROCHLORIDE DIHYDRATE 2; .5 MG/1; MG/1
2 TABLET SUBLINGUAL ONCE
Status: COMPLETED | OUTPATIENT
Start: 2023-04-11 | End: 2023-04-11

## 2023-04-11 RX ORDER — BUPRENORPHINE HYDROCHLORIDE AND NALOXONE HYDROCHLORIDE DIHYDRATE 8; 2 MG/1; MG/1
1 TABLET SUBLINGUAL DAILY
Status: DISCONTINUED | OUTPATIENT
Start: 2023-04-12 | End: 2023-04-14 | Stop reason: HOSPADM

## 2023-04-11 RX ORDER — ONDANSETRON 4 MG/1
4 TABLET, ORALLY DISINTEGRATING ORAL
Status: DISCONTINUED | OUTPATIENT
Start: 2023-04-11 | End: 2023-04-14 | Stop reason: HOSPADM

## 2023-04-11 RX ADMIN — CYCLOBENZAPRINE 5 MG: 10 TABLET, FILM COATED ORAL at 12:00

## 2023-04-11 RX ADMIN — BUPRENORPHINE AND NALOXONE 2 TABLET: 2; .5 TABLET SUBLINGUAL at 11:59

## 2023-04-11 RX ADMIN — RISPERIDONE 1 MG: 1 TABLET ORAL at 21:49

## 2023-04-11 RX ADMIN — METHOCARBAMOL 750 MG: 500 TABLET ORAL at 04:56

## 2023-04-11 RX ADMIN — MIRTAZAPINE 15 MG: 15 TABLET, FILM COATED ORAL at 21:49

## 2023-04-11 RX ADMIN — CYCLOBENZAPRINE 5 MG: 10 TABLET, FILM COATED ORAL at 16:49

## 2023-04-11 RX ADMIN — CYCLOBENZAPRINE 5 MG: 10 TABLET, FILM COATED ORAL at 08:38

## 2023-04-11 RX ADMIN — ONDANSETRON 4 MG: 4 TABLET, ORALLY DISINTEGRATING ORAL at 21:54

## 2023-04-11 RX ADMIN — BUPRENORPHINE AND NALOXONE 2 TABLET: 2; .5 TABLET SUBLINGUAL at 08:38

## 2023-04-11 RX ADMIN — RISPERIDONE 1 MG: 1 TABLET ORAL at 11:59

## 2023-04-11 RX ADMIN — ONDANSETRON 4 MG: 4 TABLET, ORALLY DISINTEGRATING ORAL at 04:57

## 2023-04-11 NOTE — PROGRESS NOTES
Laboratory monitoring for mood stabilizer and antipsychotics:    Recommended baseline monitoring has not been completed based on this patient's current medication regimen. The following labs have been ordered to complete baseline monitoring: lipid panel, HbA1c    Please see Care Everywhere for recent TSH level completed in January at [facility]     4/14 update - patient declined offer of above labs. Recommend outpatient follow-up     The patient is currently taking the following medication(s):   Current Facility-Administered Medications   Medication Dose Route Frequency    buprenorphine-naloxone (SUBOXONE) 2-0.5mg SL tablet  2 Tablet SubLINGual ONCE    [START ON 4/12/2023] buprenorphine-naloxone (SUBOXONE) 8-2mg SL tablet  1 Tablet SubLINGual DAILY    mirtazapine (REMERON) tablet 15 mg  15 mg Oral QHS    risperiDONE (RisperDAL) tablet 1 mg  1 mg Oral Q12H    cyclobenzaprine (FLEXERIL) tablet 5 mg  5 mg Oral TID       Height, Weight, BMI Estimation  Estimated body mass index is 25.75 kg/m² as calculated from the following:    Height as of this encounter: 162.6 cm (64\"). Weight as of this encounter: 68 kg (150 lb). Renal Function, Hepatic Function and Chemistry  Estimated Creatinine Clearance: 72.5 mL/min (by C-G formula based on SCr of 0.87 mg/dL). Lab Results   Component Value Date/Time    Sodium 138 04/08/2023 02:25 PM    Potassium 3.5 04/08/2023 02:25 PM    Chloride 100 04/08/2023 02:25 PM    CO2 25 04/08/2023 02:25 PM    Anion gap 13 04/08/2023 02:25 PM    Glucose 106 (H) 04/08/2023 02:25 PM    BUN 9 04/08/2023 02:25 PM    Creatinine 0.87 04/08/2023 02:25 PM    BUN/Creatinine ratio 10 (L) 04/08/2023 02:25 PM    GFR est AA >60 07/28/2022 09:26 PM    GFR est non-AA >60 07/28/2022 09:26 PM    Calcium 9.6 04/08/2023 02:25 PM    ALT (SGPT) 21 04/08/2023 02:25 PM    Alk.  phosphatase 114 04/08/2023 02:25 PM    Protein, total 8.3 (H) 04/08/2023 02:25 PM    Albumin 4.4 04/08/2023 02:25 PM    Globulin 3.9 04/08/2023 02:25 PM    A-G Ratio 1.1 04/08/2023 02:25 PM    Bilirubin, total 0.7 04/08/2023 02:25 PM       Lab Results   Component Value Date/Time    Glucose 106 (H) 04/08/2023 02:25 PM    Glucose (POC) 130 (H) 02/24/2019 09:31 AM       Hematology  Lab Results   Component Value Date/Time    WBC 7.5 04/08/2023 02:25 PM    HGB 14.7 04/08/2023 02:25 PM    HCT 42.6 04/08/2023 02:25 PM    PLATELET 585 33/52/4200 02:25 PM    MCV 88.8 04/08/2023 02:25 PM       Lipids  Lab Results   Component Value Date/Time    Cholesterol, total 144 09/03/2019 08:27 AM    HDL Cholesterol 29 09/03/2019 08:27 AM    LDL, calculated 84.2 09/03/2019 08:27 AM    Triglyceride 154 (H) 09/03/2019 08:27 AM    CHOL/HDL Ratio 5.0 09/03/2019 08:27 AM       Vitals  Visit Vitals  /79   Pulse 64   Temp 98 °F (36.7 °C)   Resp 18   Ht 162.6 cm (64\")   Wt 68 kg (150 lb)   SpO2 100%   BMI 25.75 kg/m²       Pregnancy Test  Lab Results   Component Value Date/Time    Pregnancy test,urine (POC) NEGATIVE 02/20/2019 06:59 AM       Tracy Chandra, PHARMD, BCPS, BCPP  [phone] (pharmacy)
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
## 2023-04-11 NOTE — PROGRESS NOTES
Met with patient lying in bed resting in room. Flat affect. Endorses depression and anxiety. Denies SI, HI and AVH. Isolative to self/room. Minimal interaction with staff and peers. Reports withdrawal symptoms including nausea and generalized body aches. COWS 5. Compliant with medications. No acute distress voiced or observed.    Problem: Altered Thought Process (Adult/Pediatric)  Goal: *STG: Remains safe in hospital  Outcome: Progressing Towards Goal  Goal: *STG: Seeks staff when feelings of anxiety and fear arise  Outcome: Progressing Towards Goal  Goal: *STG: Complies with medication therapy  Outcome: Progressing Towards Goal  Goal: *STG: Attends activities and groups  Outcome: Not Progressing Towards Goal

## 2023-04-11 NOTE — BH NOTES
Behavioral Health Interdisciplinary Rounds     Patient Name: Mitesh Paul  Age: 47 y.o. Room/Bed:  309/01  Primary Diagnosis: Psychosis ([facility] [address]     Progress note: Patient met with treatment team. Patent presents with flat affect and restlessness. Patient reported that she did not get a lot of sleep last night. MD informed patient that he will make medication changes. Patient is agreeable. Treatment team recommended substance use treatment after discharge. Patient appears reluctant to substance use treatment after discharge and reported that she is 'unsure at this time' and reported that she will think about. Treatment team will continue to work with patient to monitor and disposition plan.        LOS:  3  Expected LOS: 5-7    Financial concerns/prescription coverage:  no  Family contact: 4/10/2023      Family requesting physician contact today:  no  Discharge plan: to be determined   Access to weapons: unknown     Outpatient provider(s): none  Patient's preferred phone number for follow up call: [phone]    Participating treatment team members: Mitesh Paul, Edson Mccabe MD, INESSA Araiza, Supervisee in social work

## 2023-04-11 NOTE — GROUP NOTE
[facility]  GROUP DOCUMENTATION INDIVIDUAL                                                                          Group Therapy Note    Date: 4/11/2023    Group Start Time: 2228  Group End Time: 2205  Group Topic: [employer]    [facility] - [place] [address] ACUTE BEHAV HLTH    [address]; [address], [address] [place] [address]    Group Therapy Note    Attendees: 7       Attendance: Did not attend          [facility]

## 2023-04-11 NOTE — PROGRESS NOTES
2130: Pankaj Dimas  is awake, visible in the milieu. She was restless upon approach, stating that she was in a lot of pain in her upper legs. Her hygiene is adequate, and she is independent in ADLs. Pt gait is steady. She denies SI/HI/ and AVH, and demonstrates no evidence of intent to harm self or others. Pt is compliant with scheduled meds. PRN meds was given at this time. Pt encouraged to continue to participate in care. Will continue to monitor pt with q 15 min checks and hourly nursing rounds. Pt slept for a total of 5 hrs. Problem: Altered Thought Process (Adult/Pediatric)  Goal: *STG: Complies with medication therapy  Outcome: Progressing Towards Goal     Problem: Patient Education: Go to Patient Education Activity  Goal: Patient/Family Education  Outcome: Progressing Towards Goal     Problem: Depressed Mood (Adult/Pediatric)  Goal: *STG: Remains safe in hospital  Outcome: Progressing Towards Goal     Problem: Falls - Risk of  Goal: *Absence of Falls  Description: Document Nmico Fall Risk and appropriate interventions in the flowsheet.   Outcome: Progressing Towards Goal  Note: Fall Risk Interventions:            Medication Interventions: Teach patient to arise slowly

## 2023-04-11 NOTE — PROGRESS NOTES
Behavioral Services  Medicare Certification Upon Admission    I certify that this patient's inpatient psychiatric hospital admission is medically necessary for:    [x] (1) Treatment which could reasonably be expected to improve this patient's condition,       [x] (2) Or for diagnostic study;     AND     [x](2) The inpatient psychiatric services are provided while the individual is under the care of a physician and are included in the individualized plan of care.     Estimated length of stay/service 4-6 days    Plan for post-hospital care home    Electronically signed by Keren Pope MD on 4/10/2023 at 9:47 PM

## 2023-04-11 NOTE — GROUP NOTE
[facility]  GROUP DOCUMENTATION INDIVIDUAL                                                                          Group Therapy Note    Date: 4/11/2023    Group Start Time: 1400  Group End Time: 1500  Group Topic: Recreational/Music Therapy    [facility] - [place] [address] ACUTE BEHAV [facility]    [address] GROUP    Group Therapy Note    Attendees: 5       Attendance: Did not attend    Gertrudis Scott

## 2023-04-11 NOTE — GROUP NOTE
AISHA  GROUP DOCUMENTATION INDIVIDUAL                                                                          Group Therapy Note    Date: 4/11/2023    Group Start Time: 1000  Group End Time: 1100  Group Topic: Topic Group    [address] ACUTE BEHAV [facility] GROUP    Group Therapy Note    Attendees: 6       Attendance: Did not attend    Kasandra Bundy

## 2023-04-12 PROCEDURE — 65220000003 HC RM SEMIPRIVATE PSYCH

## 2023-04-12 PROCEDURE — 74011250636 HC RX REV CODE- 250/636: Performed by: PSYCHIATRY & NEUROLOGY

## 2023-04-12 PROCEDURE — 99232 SBSQ HOSP IP/OBS MODERATE 35: CPT | Performed by: PSYCHIATRY & NEUROLOGY

## 2023-04-12 PROCEDURE — 74011250637 HC RX REV CODE- 250/637: Performed by: PSYCHIATRY & NEUROLOGY

## 2023-04-12 RX ORDER — CYCLOBENZAPRINE HCL 10 MG
10 TABLET ORAL
Status: DISCONTINUED | OUTPATIENT
Start: 2023-04-12 | End: 2023-04-14 | Stop reason: HOSPADM

## 2023-04-12 RX ORDER — CYCLOBENZAPRINE HCL 10 MG
5 TABLET ORAL
Status: DISCONTINUED | OUTPATIENT
Start: 2023-04-12 | End: 2023-04-12

## 2023-04-12 RX ADMIN — CYCLOBENZAPRINE 5 MG: 10 TABLET, FILM COATED ORAL at 08:07

## 2023-04-12 RX ADMIN — BUPRENORPHINE HYDROCHLORIDE AND NALOXONE HYDROCHLORIDE DIHYDRATE 1 TABLET: 8; 2 TABLET SUBLINGUAL at 08:07

## 2023-04-12 RX ADMIN — RISPERIDONE 1 MG: 1 TABLET ORAL at 08:07

## 2023-04-12 RX ADMIN — RISPERIDONE 1 MG: 1 TABLET ORAL at 21:34

## 2023-04-12 RX ADMIN — MIRTAZAPINE 15 MG: 15 TABLET, FILM COATED ORAL at 21:34

## 2023-04-12 NOTE — PROGRESS NOTES
This writer met with pt in her bedroom. Pt observed laying in bed quietly. Pt is flat and quiet on approach with euthymic mood. Denies SI/HI/AVH, anxiety and depression. No reported paranoid ideation noted. Compliant with medications, denies withdrawal symptoms, COWS 1 at this time. Pt is withdrawn to self and isolative. Does not appear to be in distress.      Problem: Altered Thought Process (Adult/Pediatric)  Goal: *STG: Remains safe in hospital  Outcome: Progressing Towards Goal  Goal: *STG: Complies with medication therapy  Outcome: Progressing Towards Goal

## 2023-04-12 NOTE — BH NOTES
PSYCHIATRIC PROGRESS NOTE         Patient Name  Carol Crandall   Date of Birth 1970   [facility] [id number]   Medical Record Number  [id number]      Age  47 y.o. PCP Other, MD Ike   Admit date:  4/8/2023    Room Number  997/61  @ [facility]   Date of Service  4/12/2023         E & M PROGRESS NOTE:         HISTORY       CC:  \"psychosis\"  HISTORY OF PRESENT ILLNESS/INTERVAL HISTORY:  (reviewed/updated 4/12/2023). per initial evaluation: The patient is a 50-year-old  female who is currently admitted on a TDO. She was brought into the ER in restraints for agitation. I attempted to interview the patient, but as soon as I entered her room, she started loudly saying \"I do not want to talk\" and declined answering questions. There was no further interaction possible with her. After arriving on the unit, the patient was quite agitated, aggressive with nursing staff and shouting. She has to be given IM p.r.n. to help her calm down, and she had been in her room since then. No new information available from the patient herself, and providing to the most in the ER, she was brought in with a history of being paranoid about her identity being stolen and had stopped taking medications many months ago because she was concerned that her medications were being poisoned. There is a history of her being on Lexapro and Suboxone after she was hospitalized in our facility last year in August.  She has been refusing to take any medications since her arrival on the unit. Reportedly, she used crack cocaine two days ago and used heroin. Her urine drug screen is positive for amphetamines and cocaine, but not for opiates. The patient could not add anything to her history. 04/10 - the patient has been visible; she slept 7.5 hours overnight and today has been describing ongoing safety concerns from her ex .  She is oriented and organized on interview but reports significant anxiety from fears of her medication being 'switched.' Patient contracts for safety, she is in moderate distress during interview but able to make her needs known. She denies SI/HI but endorses vague AVH. She acknowledges having been prescribed antipsychotics in the past, which she took twice a day but did not remember the name of the agent. Patient ill appearing, agreed to restart Suboxone today and reassess after her TDO hearing. 04/11 - no acute overnight events. The patient has been visible, in moderate distress from ongoing withdrawal symptoms but able to make her needs known and with no thoughts of self harm. Patient slept 5 hours overnight and got PRN Zyprexa as well as Trazodone. Paranoid thoughts persist, the patient denies SI/AVH. She is medication compliant and requests an increased dose of Suboxone. Patient amenable to starting an agent to address ongoing paranoid ideation as well as stimulant craving. 04/12 - the patient slept 5.25 overnight and remains in fair behavioral control. She reports improving physical health and vitals have been unremarkable. Patient with COWS score of 1 and she is anxious appearing but in no distress. She continues to report paranoid ideation and is ambivalent about rehab services, stating her last admission to rehab facility was unsucessful due to peers' calling her a snitch and switching her vapes. She is unable to reality test but asks about the timeline for these thoughts to subside. SIDE EFFECTS: (reviewed/updated 4/12/2023)  None reported or admitted to.      ALLERGIES:(reviewed/updated 4/12/2023)  No Known Allergies   REVIEW OF SYSTEMS: (reviewed/updated 4/12/2023)  Appetite:no change from normal   Sleep: poor with DIMS (difficulty initiating & maintaining sleep)   All other Review of Systems: Negative except withdrawal symptoms per HPI         [facility] (MSE):    MSE FINDINGS ARE WITHIN NORMAL LIMITS (WNL) UNLESS OTHERWISE STATED BELOW. ( ALL OF THE BELOW CATEGORIES OF THE MSE HAVE BEEN REVIEWED (reviewed 4/12/2023) AND UPDATED AS DEEMED APPROPRIATE )  General Presentation age appropriate, cooperative   Orientation oriented to time, place and person   Vital Signs  See below (reviewed 4/12/2023); Vital Signs (BP, Pulse, & Temp) are within normal limits if not listed below. Gait and Station Stable/steady, no ataxia   Musculoskeletal System No extrapyramidal symptoms (EPS); no abnormal muscular movements or Tardive Dyskinesia (TD); muscle strength and tone are within normal limits   Language No aphasia or dysarthria   Speech:  normal volume and non-pressured   Thought Processes Coherent normal rate of thoughts; fair abstract reasoning/computation   Thought Associations goal directed   Thought Content paranoid delusions and visual hallucinations   Suicidal Ideations contracts for safety   Homicidal Ideations contracts for safety   Mood:  anxious  and depressed   Affect:  constricted   Memory recent  impaired   Memory remote:  fair   Concentration/Attention:  intact   Fund of Knowledge average   Insight:  limited   Reliability fair   Judgment:  poor          VITALS:     Patient Vitals for the past 24 hrs:   Temp Pulse Resp BP SpO2   04/12/23 0805 98.3 °F (36.8 °C) 85 18 125/75 95 %   04/11/23 2014 98.3 °F (36.8 °C) 84 18 121/78 100 %       Wt Readings from Last 3 Encounters:   04/08/23 68 kg (150 lb)   07/29/22 58.8 kg (129 lb 10.1 oz)   09/07/19 59.8 kg (131 lb 13.4 oz)     Temp Readings from Last 3 Encounters:   04/12/23 98.3 °F (36.8 °C)   08/05/22 97.6 °F (36.4 °C)   07/29/22 97.6 °F (36.4 °C)     BP Readings from Last 3 Encounters:   04/12/23 125/75   08/05/22 97/62   07/29/22 97/60     Pulse Readings from Last 3 Encounters:   04/12/23 85   08/05/22 60   07/29/22 (!) 51            DATA     LABORATORY DATA:(reviewed/updated 4/12/2023)  No results found for this or any previous visit (from the past 24 hour(s)).   No results found for: VALF2, VALAC, VALP, VALPR, DS6, CRBAM, CRBAMP, CARB2, XCRBAM  No results found for: LITHM   RADIOLOGY REPORTS:(reviewed/updated 4/12/2023)  No results found. MEDICATIONS     ALL MEDICATIONS:   Current Facility-Administered Medications   Medication Dose Route Frequency    cyclobenzaprine (FLEXERIL) tablet 10 mg  10 mg Oral TID PRN    ondansetron (ZOFRAN ODT) tablet 4 mg  4 mg Oral Q8H PRN    buprenorphine-naloxone (SUBOXONE) 8-2mg SL tablet  1 Tablet SubLINGual DAILY    mirtazapine (REMERON) tablet 15 mg  15 mg Oral QHS    risperiDONE (RisperDAL) tablet 1 mg  1 mg Oral Q12H    naloxone (NARCAN) injection 0.4 mg  0.4 mg IntraMUSCular EVERY 2 MINUTES AS NEEDED    OLANZapine (ZyPREXA) tablet 5 mg  5 mg Oral Q6H PRN    haloperidol lactate (HALDOL) injection 5 mg  5 mg IntraMUSCular Q6H PRN    benztropine (COGENTIN) tablet 1 mg  1 mg Oral BID PRN    diphenhydrAMINE (BENADRYL) injection 50 mg  50 mg IntraMUSCular BID PRN    hydrOXYzine HCL (ATARAX) tablet 50 mg  50 mg Oral TID PRN    LORazepam (ATIVAN) injection 1 mg  1 mg IntraMUSCular Q4H PRN    traZODone (DESYREL) tablet 50 mg  50 mg Oral QHS PRN    acetaminophen (TYLENOL) tablet 650 mg  650 mg Oral Q4H PRN    magnesium hydroxide (MILK OF MAGNESIA) 400 mg/5 mL oral suspension 30 mL  30 mL Oral DAILY PRN    nicotine buccal (POLACRILEX) lozenge 2 mg  2 mg Oral Q2H PRN      SCHEDULED MEDICATIONS:   Current Facility-Administered Medications   Medication Dose Route Frequency    buprenorphine-naloxone (SUBOXONE) 8-2mg SL tablet  1 Tablet SubLINGual DAILY    mirtazapine (REMERON) tablet 15 mg  15 mg Oral QHS    risperiDONE (RisperDAL) tablet 1 mg  1 mg Oral Q12H          ASSESSMENT & PLAN     DIAGNOSES REQUIRING ACTIVE TREATMENT AND MONITORING: (reviewed/updated 4/12/2023)  Patient Active Hospital Problem List:       Psychosis [facility]) (4/8/2023)           Assessment: the patient presents with ongoing paranoid ideation in the setting of stimulant abuse.  She has been in moderate distress from polysubstance withdrawal but also continues to endorse symptoms of psychosis. Will observe for now, treat withdrawal, consider AP if she does not resolve off substances. Plan:   - CONTINUE Suboxone 8 mg SL QDAY for opiate withdrawal, cravings  - CHANGE Flexeril 5 mg to PRN TID for myalgias  - CONTINUE Risperdal 1 mg BID for paranoid ideation  - CONTINUE Remeron 15 mg QHS for depressed mood, stimulant cravings  - IGM therapy as tolerated  - Dispo planning (home when stable)    In summary, Naif Salazar, is a 47 y.o.  female who presents with a severe exacerbation of the principal diagnosis of Psychosis ([facility] [address]    Patient's condition is improving. Patient requires continued inpatient hospitalization for further stabilization, safety monitoring and medication management. I will continue to coordinate the provision of individual, milieu, occupational, group, and substance abuse therapies to address target symptoms/diagnoses as deemed appropriate for the individual patient. A coordinated, multidisplinary treatment team round was conducted with the patient (this team consists of the nurse, psychiatric unit pharmacist,  and writer). Complete current electronic health record for patient has been reviewed today including consultant notes, ancillary staff notes, nurses and psychiatric tech notes. Suicide risk assessment completed and patient deemed to be of moderate risk for suicide at this time. The following regarding medications was addressed during rounds with patient:   the risks and benefits of the proposed medication. The patient was given the opportunity to ask questions. Informed consent given to the use of the above medications. Will continue to adjust psychiatric and non-psychiatric medications (see above \"medication\" section and orders section for details) as deemed appropriate & based upon diagnoses and response to treatment.      I will continue to order blood tests/labs and diagnostic tests as deemed appropriate and review results as they become available (see orders for details and above listed lab/test results). I will order psychiatric records from previous [facility] to further elucidate the nature of patient's psychopathology and review once available. I will gather additional collateral information from friends, family and o/p treatment team to further elucidate the nature of patient's psychopathology and baselline level of psychiatric functioning. I certify that this patient's inpatient psychiatric hospital services furnished since the previous certification were, and continue to be, required for treatment that could reasonably be expected to improve the patient's condition, or for diagnostic study, and that the patient continues to need, on a daily basis, active treatment furnished directly by or requiring the supervision of inpatient psychiatric facility personnel. In addition, the hospital records show that services furnished were intensive treatment services, admission or related services, or equivalent services.     EXPECTED DISCHARGE DATE/DAY: 04/13/23     DISPOSITION: Home       Signed By:   Harsha Taveras MD  4/12/2023

## 2023-04-12 NOTE — GROUP NOTE
AISHA  GROUP DOCUMENTATION INDIVIDUAL                                                                          Group Therapy Note    Date: 4/12/2023    Group Start Time: 1000  Group End Time: 1100  Group Topic: Topic Group    [address] ACUTE BEHAV [facility]    [facility] GROUP    Group Therapy Note    Attendees: 7       Attendance: Did not attend      Josiane Murphy

## 2023-04-12 NOTE — BH NOTES
Behavioral Health Interdisciplinary Rounds     Patient Name: Jude Angeles  Age: 47 y.o. Room/Bed:  309/01  Primary Diagnosis: Psychosis ([facility] [address]     Progress note: Patient met with treatment team. Patient presented with paranoid delusions. Treatment team dicussed with patient that paranoid behavior is likely due to substance use and informed patient that recommended treatment is inpatient rehab. Patient reported that she is unwilling to seek treatment for substances after discharge. Patient was encouraged to maintain sobriety after discharge in order to minimize paranoia. Patient reported that she would like to return to friend, Domo ellsworth. This writer contacted Pee Oroepza to provide update that patient is unwilling to attend substance use treatment at this time. Pee Oropeza reported that patient is able to return to residence under condition that patient does not use drugs in his home. Pee Oropeza reported that he would call patient and discuss requirement with patient prior to discharge. Instructions will be provided for patient to follow up with [facility] for mental health needs and to [facility] for substance use needs.        LOS:  4  Expected LOS: 5-6    Financial concerns/prescription coverage:  no  Family contact: 4/12/2023      Family requesting physician contact today:  no  Discharge plan: to be determined   Access to weapons: unknown     Outpatient provider(s): none  Patient's preferred phone number for follow up call: [phone]    Participating treatment team members: Jude Angeles, Fawn Herrmann MD, INESSA Magallanes, Supervisee in social work

## 2023-04-12 NOTE — PROGRESS NOTES
America Albarado was met in her room where she was observed resting but easily arouse when her named was called out to her. Pt was observed to be isolative to her room during this shift. Pt was medication compliant. Pt did not display any signs of distress or complaints of adverse reactions to medications. Pt denies pain, SI, HI, AVH, depression and anxiety. Pt stated that her mood is indifferent, affect is flat. Pt was calm and cooperative during assessment. Pt will continue to be monitored for her safety. Pt slept for a total of 5.25hrs. Problem: Altered Thought Process (Adult/Pediatric)  Goal: *STG: Remains safe in hospital  Outcome: Progressing Towards Goal  Goal: *STG: Complies with medication therapy  Outcome: Progressing Towards Goal  Goal: *STG: Decreased hallucinations  Outcome: Progressing Towards Goal     Problem: Depressed Mood (Adult/Pediatric)  Goal: *STG: Remains safe in hospital  Outcome: Progressing Towards Goal  Goal: *STG: Complies with medication therapy  Outcome: Progressing Towards Goal     Problem: Falls - Risk of  Goal: *Absence of Falls  Description: Document Nimco Fall Risk and appropriate interventions in the flowsheet.   Outcome: Progressing Towards Goal  Note: Fall Risk Interventions:        Medication Interventions: Teach patient to arise slowly       Problem: Patient Education: Go to Patient Education Activity  Goal: Patient/Family Education  Outcome: Progressing Towards Goal

## 2023-04-13 PROCEDURE — 65220000003 HC RM SEMIPRIVATE PSYCH

## 2023-04-13 PROCEDURE — 74011250637 HC RX REV CODE- 250/637: Performed by: NURSE PRACTITIONER

## 2023-04-13 PROCEDURE — 74011250637 HC RX REV CODE- 250/637: Performed by: PSYCHIATRY & NEUROLOGY

## 2023-04-13 PROCEDURE — 74011250636 HC RX REV CODE- 250/636: Performed by: PSYCHIATRY & NEUROLOGY

## 2023-04-13 PROCEDURE — 99232 SBSQ HOSP IP/OBS MODERATE 35: CPT | Performed by: PSYCHIATRY & NEUROLOGY

## 2023-04-13 RX ADMIN — Medication 2 MG: at 16:04

## 2023-04-13 RX ADMIN — Medication 2 MG: at 18:01

## 2023-04-13 RX ADMIN — RISPERIDONE 1 MG: 1 TABLET ORAL at 21:15

## 2023-04-13 RX ADMIN — MIRTAZAPINE 15 MG: 15 TABLET, FILM COATED ORAL at 21:15

## 2023-04-13 RX ADMIN — BUPRENORPHINE HYDROCHLORIDE AND NALOXONE HYDROCHLORIDE DIHYDRATE 1 TABLET: 8; 2 TABLET SUBLINGUAL at 08:30

## 2023-04-13 NOTE — BH NOTES
Behavioral Health Interdisciplinary Rounds     Patient Name: Jessica Gross  Age: 47 y.o. Room/Bed:  309/01  Primary Diagnosis: Psychosis ([facility] [address]     Progress note: Patient met with treatment team. Patient presented with flat affect and depressed mood. Patient was tearful and presented with labile mood. Patient was alert and oriented x4. Patient continued to communicate that she is unwilling to attend substance use treatment and plans to maintain sobriety outside of the hospital. MD presented TREJO option prior to discharge to support delusional and paranoid thought process in the community. MD provided education regarding importance for sobriety in order to maintain stability in the community. Patient agreed to receive TREJO prior to discharge. Treatment team is prepared for patient to receive TREJO tomorrow and discharge afterwards. This writer contacted friend Dara Ocampo to inform of discharge plan and he plans to pick patient up on 4/14/2023 at 12 pm. Instructions will be provided for patient to follow up with [facility] for mental health needs and to [facility] for substance use needs.        LOS:  5  Expected LOS: 6    Financial concerns/prescription coverage:  no  Family contact: 4/12/2023      Family requesting physician contact today:  no  Discharge plan: to be determined   Access to weapons: unknown     Outpatient provider(s): none  Patient's preferred phone number for follow up call: [phone]    Participating treatment team members: Jessica Gross, Becca Rondon MD, INESSA Madrigal, Supervisee in social work

## 2023-04-13 NOTE — PROGRESS NOTES
Problem: Altered Thought Process (Adult/Pediatric)  Goal: *STG: Seeks staff when feelings of anxiety and fear arise  Outcome: Progressing Towards Goal     Problem: Depressed Mood (Adult/Pediatric)  Goal: *STG: Participates in treatment plan  Outcome: Progressing Towards Goal  Goal: *STG: Remains safe in hospital  Outcome: Progressing Towards Goal  Goal: *STG: Complies with medication therapy  Outcome: Progressing Towards Goal     Problem: Falls - Risk of  Goal: *Absence of Falls  Description: Document Nimco Fall Risk and appropriate interventions in the flowsheet. Outcome: Progressing Towards Goal  Note: Fall Risk Interventions:  Medication Interventions: Teach patient to arise slowly    Patient received resting in her room. Denies SI/HI/AVH, depression and anxiety. Calm, cooperative, pleasant, isolative to her room, not attending groups. Refused Risperdal this morning, took other medications as prescribed. Will continue to monitor for safety.

## 2023-04-13 NOTE — BH NOTES
PSYCHIATRIC PROGRESS NOTE         Patient Name  Carlos Alberto Rosenberg   Date of Birth 1970   [facility] [id number]   Medical Record Number  [id number]      Age  47 y.o. PCP Other, MD Ike   Admit date:  4/8/2023    Room Number  248/60  @ [facility]   Date of Service  4/13/2023         E & M PROGRESS NOTE:         HISTORY       CC:  \"psychosis\"  HISTORY OF PRESENT ILLNESS/INTERVAL HISTORY:  (reviewed/updated 4/13/2023). per initial evaluation: The patient is a 56-year-old  female who is currently admitted on a TDO. She was brought into the ER in restraints for agitation. I attempted to interview the patient, but as soon as I entered her room, she started loudly saying \"I do not want to talk\" and declined answering questions. There was no further interaction possible with her. After arriving on the unit, the patient was quite agitated, aggressive with nursing staff and shouting. She has to be given IM p.r.n. to help her calm down, and she had been in her room since then. No new information available from the patient herself, and providing to the most in the ER, she was brought in with a history of being paranoid about her identity being stolen and had stopped taking medications many months ago because she was concerned that her medications were being poisoned. There is a history of her being on Lexapro and Suboxone after she was hospitalized in our facility last year in August.  She has been refusing to take any medications since her arrival on the unit. Reportedly, she used crack cocaine two days ago and used heroin. Her urine drug screen is positive for amphetamines and cocaine, but not for opiates. The patient could not add anything to her history. 04/10 - the patient has been visible; she slept 7.5 hours overnight and today has been describing ongoing safety concerns from her ex .  She is oriented and organized on interview but reports significant anxiety from fears of her medication being 'switched.' Patient contracts for safety, she is in moderate distress during interview but able to make her needs known. She denies SI/HI but endorses vague AVH. She acknowledges having been prescribed antipsychotics in the past, which she took twice a day but did not remember the name of the agent. Patient ill appearing, agreed to restart Suboxone today and reassess after her TDO hearing. 04/11 - no acute overnight events. The patient has been visible, in moderate distress from ongoing withdrawal symptoms but able to make her needs known and with no thoughts of self harm. Patient slept 5 hours overnight and got PRN Zyprexa as well as Trazodone. Paranoid thoughts persist, the patient denies SI/AVH. She is medication compliant and requests an increased dose of Suboxone. Patient amenable to starting an agent to address ongoing paranoid ideation as well as stimulant craving. 04/12 - the patient slept 5.25 overnight and remains in fair behavioral control. She reports improving physical health and vitals have been unremarkable. Patient with COWS score of 1 and she is anxious appearing but in no distress. She continues to report paranoid ideation and is ambivalent about rehab services, stating her last admission to rehab facility was unsucessful due to peers' calling her a snitch and switching her vapes. She is unable to reality test but asks about the timeline for these thoughts to subside. 04/13 - no acute overnight events. The patient is improved per RN report. She refused labs and AM dose of Risperdal, though she states this was due to confusion about which agent she was supposed to be taking. Patient tearful on interview, she continues to cite PI about her last rehab experience but voices understanding of the role of her medication and the importance of sobriety. Patient discharge focused, she is amenable to getting an TREJO prior to discharge.         SIDE EFFECTS: (reviewed/updated 4/13/2023)  None reported or admitted to. ALLERGIES:(reviewed/updated 4/13/2023)  No Known Allergies   REVIEW OF SYSTEMS: (reviewed/updated 4/13/2023)  Appetite:no change from normal   Sleep: poor with DIMS (difficulty initiating & maintaining sleep)   All other Review of Systems: Negative except withdrawal symptoms per HPI         [address] [facility] (MSE):    MSE FINDINGS ARE WITHIN NORMAL LIMITS (WNL) UNLESS OTHERWISE STATED BELOW. ( ALL OF THE BELOW CATEGORIES OF THE MSE HAVE BEEN REVIEWED (reviewed 4/13/2023) AND UPDATED AS DEEMED APPROPRIATE )  General Presentation age appropriate, cooperative   Orientation oriented to time, place and person   Vital Signs  See below (reviewed 4/13/2023); Vital Signs (BP, Pulse, & Temp) are within normal limits if not listed below.    Gait and Station Stable/steady, no ataxia   Musculoskeletal System No extrapyramidal symptoms (EPS); no abnormal muscular movements or Tardive Dyskinesia (TD); muscle strength and tone are within normal limits   Language No aphasia or dysarthria   Speech:  normal volume and non-pressured   Thought Processes Coherent normal rate of thoughts; fair abstract reasoning/computation   Thought Associations goal directed   Thought Content paranoid delusions   Suicidal Ideations contracts for safety   Homicidal Ideations contracts for safety   Mood:  anxious  and depressed   Affect:  constricted   Memory recent  impaired   Memory remote:  fair   Concentration/Attention:  intact   Fund of Knowledge average   Insight:  limited   Reliability fair   Judgment:  fair          VITALS:     Patient Vitals for the past 24 hrs:   Temp Pulse Resp BP SpO2   04/12/23 2000 97.8 °F (36.6 °C) 87 16 130/87 97 %       Wt Readings from Last 3 Encounters:   04/08/23 68 kg (150 lb)   07/29/22 58.8 kg (129 lb 10.1 oz)   09/07/19 59.8 kg (131 lb 13.4 oz)     Temp Readings from Last 3 Encounters:   04/12/23 97.8 °F (36.6 °C) 08/05/22 97.6 °F (36.4 °C)   07/29/22 97.6 °F (36.4 °C)     BP Readings from Last 3 Encounters:   04/12/23 130/87   08/05/22 97/62   07/29/22 97/60     Pulse Readings from Last 3 Encounters:   04/12/23 87   08/05/22 60   07/29/22 (!) 51            DATA     LABORATORY DATA:(reviewed/updated 4/13/2023)  No results found for this or any previous visit (from the past 24 hour(s)). No results found for: VALF2, VALAC, VALP, VALPR, DS6, CRBAM, CRBAMP, CARB2, XCRBAM  No results found for: LITHM   RADIOLOGY REPORTS:(reviewed/updated 4/13/2023)  No results found.        MEDICATIONS     ALL MEDICATIONS:   Current Facility-Administered Medications   Medication Dose Route Frequency    [START ON 4/14/2023] risperiDONE ER (PERSERIS) subcutaneous injection 90 mg  90 mg SubCUTAneous EVERY MONTH    cyclobenzaprine (FLEXERIL) tablet 10 mg  10 mg Oral TID PRN    ondansetron (ZOFRAN ODT) tablet 4 mg  4 mg Oral Q8H PRN    buprenorphine-naloxone (SUBOXONE) 8-2mg SL tablet  1 Tablet SubLINGual DAILY    mirtazapine (REMERON) tablet 15 mg  15 mg Oral QHS    risperiDONE (RisperDAL) tablet 1 mg  1 mg Oral Q12H    naloxone (NARCAN) injection 0.4 mg  0.4 mg IntraMUSCular EVERY 2 MINUTES AS NEEDED    OLANZapine (ZyPREXA) tablet 5 mg  5 mg Oral Q6H PRN    haloperidol lactate (HALDOL) injection 5 mg  5 mg IntraMUSCular Q6H PRN    benztropine (COGENTIN) tablet 1 mg  1 mg Oral BID PRN    diphenhydrAMINE (BENADRYL) injection 50 mg  50 mg IntraMUSCular BID PRN    hydrOXYzine HCL (ATARAX) tablet 50 mg  50 mg Oral TID PRN    LORazepam (ATIVAN) injection 1 mg  1 mg IntraMUSCular Q4H PRN    traZODone (DESYREL) tablet 50 mg  50 mg Oral QHS PRN    acetaminophen (TYLENOL) tablet 650 mg  650 mg Oral Q4H PRN    magnesium hydroxide (MILK OF MAGNESIA) 400 mg/5 mL oral suspension 30 mL  30 mL Oral DAILY PRN    nicotine buccal (POLACRILEX) lozenge 2 mg  2 mg Oral Q2H PRN      SCHEDULED MEDICATIONS:   Current Facility-Administered Medications   Medication Dose Route Frequency    [START ON 2023] risperiDONE ER (PERSERIS) subcutaneous injection 90 mg  90 mg SubCUTAneous EVERY MONTH    buprenorphine-naloxone (SUBOXONE) 8-2mg SL tablet  1 Tablet SubLINGual DAILY    mirtazapine (REMERON) tablet 15 mg  15 mg Oral QHS    risperiDONE (RisperDAL) tablet 1 mg  1 mg Oral Q12H          ASSESSMENT & PLAN     DIAGNOSES REQUIRING ACTIVE TREATMENT AND MONITORING: (reviewed/updated 2023)  Patient Active Hospital Problem List:       Psychosis [facility]) (2023)           Assessment: the patient presents with ongoing paranoid ideation in the setting of stimulant abuse. She has been in moderate distress from polysubstance withdrawal but also continues to endorse symptoms of psychosis. Will observe for now, treat withdrawal, consider AP if she does not resolve off substances. Plan:   - CONTINUE Suboxone 8 mg SL QDAY for opiate withdrawal, cravings  - CONTINUE Flexeril 5 mg to PRN TID for myalgias  -  Risperdal 1 mg BID for paranoid ideation (refusing)  - START Perseris 90 mg Q4Wks for paranoid ideation  - CONTINUE Remeron 15 mg QHS for depressed mood, stimulant cravings  - IGM therapy as tolerated  - Dispo planning (home when stable)    In summary, Mandeep Patton, is a 47 y.o.  female who presents with a severe exacerbation of the principal diagnosis of Psychosis ([address]    Patient's condition is improving. Patient requires continued inpatient hospitalization for further stabilization, safety monitoring and medication management. I will continue to coordinate the provision of individual, milieu, occupational, group, and substance abuse therapies to address target symptoms/diagnoses as deemed appropriate for the individual patient. A coordinated, multidisplinary treatment team round was conducted with the patient (this team consists of the nurse, psychiatric unit pharmacist,  and writer).      Complete current electronic health record for patient has been reviewed today including consultant notes, ancillary staff notes, nurses and psychiatric tech notes. Suicide risk assessment completed and patient deemed to be of moderate risk for suicide at this time. The following regarding medications was addressed during rounds with patient:   the risks and benefits of the proposed medication. The patient was given the opportunity to ask questions. Informed consent given to the use of the above medications. Will continue to adjust psychiatric and non-psychiatric medications (see above \"medication\" section and orders section for details) as deemed appropriate & based upon diagnoses and response to treatment. I will continue to order blood tests/labs and diagnostic tests as deemed appropriate and review results as they become available (see orders for details and above listed lab/test results). I will order psychiatric records from previous [facility] hospitals to further elucidate the nature of patient's psychopathology and review once available. I will gather additional collateral information from friends, family and o/p treatment team to further elucidate the nature of patient's psychopathology and baselline level of psychiatric functioning. I certify that this patient's inpatient psychiatric hospital services furnished since the previous certification were, and continue to be, required for treatment that could reasonably be expected to improve the patient's condition, or for diagnostic study, and that the patient continues to need, on a daily basis, active treatment furnished directly by or requiring the supervision of inpatient psychiatric facility personnel. In addition, the hospital records show that services furnished were intensive treatment services, admission or related services, or equivalent services.     EXPECTED DISCHARGE DATE/DAY: 04/14/23     DISPOSITION: Home       Signed By:   Estelle Samuel MD  4/13/2023

## 2023-04-13 NOTE — GROUP NOTE
AISHA  GROUP DOCUMENTATION INDIVIDUAL                                                                          Group Therapy Note    Date: 4/13/2023    Group Start Time: 1500  Group End Time: 1600  Group Topic: Recreational/Music Therapy    [address] [facility] 3 ACUTE BEHAV [facility]    Josue Olivares [facility] GROUP    Group Therapy Note    Attendees: 8       Attendance: Did not attend    Juan Thornton

## 2023-04-13 NOTE — GROUP NOTE
AISHA  GROUP DOCUMENTATION INDIVIDUAL                                                                          Group Therapy Note    Date: 4/13/2023    Group Start Time: 1000  Group End Time: 1100  Group Topic: Topic Group    [facility] 3 ACUTE BEHAV [facility]    Josue Olivares [facility] GROUP    Group Therapy Note    Attendees: 6       Attendance: Did not attend      Nicholas Rodríguez

## 2023-04-14 VITALS
HEIGHT: 64 IN | SYSTOLIC BLOOD PRESSURE: 115 MMHG | RESPIRATION RATE: 15 BRPM | HEART RATE: 85 BPM | BODY MASS INDEX: 25.61 KG/M2 | OXYGEN SATURATION: 99 % | DIASTOLIC BLOOD PRESSURE: 78 MMHG | WEIGHT: 150 LBS | TEMPERATURE: 97.3 F

## 2023-04-14 PROCEDURE — 74011250637 HC RX REV CODE- 250/637: Performed by: PSYCHIATRY & NEUROLOGY

## 2023-04-14 PROCEDURE — 99239 HOSP IP/OBS DSCHRG MGMT >30: CPT | Performed by: PSYCHIATRY & NEUROLOGY

## 2023-04-14 PROCEDURE — 74011250637 HC RX REV CODE- 250/637: Performed by: NURSE PRACTITIONER

## 2023-04-14 PROCEDURE — 74011250636 HC RX REV CODE- 250/636: Performed by: PSYCHIATRY & NEUROLOGY

## 2023-04-14 RX ORDER — BUPRENORPHINE HYDROCHLORIDE AND NALOXONE HYDROCHLORIDE DIHYDRATE 8; 2 MG/1; MG/1
1 TABLET SUBLINGUAL DAILY
Qty: 30 TABLET | Refills: 0 | Status: SHIPPED | OUTPATIENT
Start: 2023-04-15 | End: 2023-05-15

## 2023-04-14 RX ORDER — MIRTAZAPINE 15 MG/1
15 TABLET, FILM COATED ORAL
Qty: 30 TABLET | Refills: 1 | Status: SHIPPED | OUTPATIENT
Start: 2023-04-14

## 2023-04-14 RX ADMIN — RISPERIDONE 1 MG: 1 TABLET ORAL at 08:32

## 2023-04-14 RX ADMIN — Medication 2 MG: at 10:17

## 2023-04-14 RX ADMIN — BUPRENORPHINE HYDROCHLORIDE AND NALOXONE HYDROCHLORIDE DIHYDRATE 1 TABLET: 8; 2 TABLET SUBLINGUAL at 08:32

## 2023-04-14 RX ADMIN — RISPERIDONE 90 MG: KIT SUBCUTANEOUS at 11:35

## 2023-04-14 NOTE — PROGRESS NOTES
Pt discharged to home. Pt denies SI/HI/AVH, depression and anxiety. Discharge instructions reviewed with pt. All belongings returned to pt. Pt transported home by family member. All questions answered to the pts understanding and satisfaction.

## 2023-04-14 NOTE — PROGRESS NOTES
Problem: Altered Thought Process (Adult/Pediatric)  Goal: *STG: Participates in treatment plan  Outcome: Progressing Towards Goal  Goal: *STG: Remains safe in hospital  Outcome: Progressing Towards Goal  Goal: *STG: Seeks staff when feelings of anxiety and fear arise  Outcome: Progressing Towards Goal  Goal: *STG: Complies with medication therapy  Outcome: Progressing Towards Goal     Problem: Depressed Mood (Adult/Pediatric)  Goal: *STG: Remains safe in hospital  Outcome: Progressing Towards Goal  Goal: *STG: Complies with medication therapy  Outcome: Progressing Towards Goal     Problem: Risk for Elopement  Goal: Patient will not exit the unit/facility without proper escort  Outcome: Progressing Towards Goal     Problem: Falls - Risk of  Goal: *Absence of Falls  Description: Document [place] Fall Risk and appropriate interventions in the flowsheet.   Outcome: Progressing Towards Goal  Note: Fall Risk Interventions:            Medication Interventions: Teach patient to arise slowly

## 2023-04-14 NOTE — DISCHARGE SUMMARY
PSYCHIATRIC DISCHARGE SUMMARY         IDENTIFICATION:    Patient Name  Landa Meigs   Date of Birth 1970   [facility] [id number]   Medical Record Number  [id number]      Age  47 y.o. PCP Other, MD Ike   Admit date:  4/8/2023    Discharge date: 4/14/2023   Room Number  784/35  @ [address]   Date of Service  4/14/2023            TYPE OF DISCHARGE: REGULAR               CONDITION AT DISCHARGE: improved and fair       PROVISIONAL & DISCHARGE DIAGNOSES:    Problem List  Date Reviewed: 9/7/2019            Codes Class    Opioid use disorder, mild, abuse ([facility] ICD-10-CM: F11.10  ICD-9-CM: 305.50         Cocaine use disorder, moderate, dependence (HCC) ICD-10-CM: F14.20  ICD-9-CM: 304.20         * (Principal) Psychosis ([facility] ICD-10-CM: F29  ICD-9-CM: 298.9         Unspecified psychosis not due to a substance or known physiological condition ([facility] ICD-10-CM: F29  ICD-9-CM: 298.9         Septic joint of left knee joint ([facility] ICD-10-CM: M00.9  ICD-9-CM: 711.06         Pyogenic arthritis of right shoulder region [facility]) ICD-10-CM: M00.9  ICD-9-CM: 711.01         Septic arthritis ([facility] ICD-10-CM: M00.9  ICD-9-CM: 711.00         Infection of hand ICD-10-CM: L08.9  ICD-9-CM: 899. 9         Cellulitis of hand ICD-10-CM: S63.301  ICD-9-CM: 715. 4            Active Hospital Problems    Opioid use disorder, mild, abuse (HCC)      Cocaine use disorder, moderate, dependence (HCC)      *Psychosis (HCC)        DISCHARGE DIAGNOSIS:   Axis I:  SEE ABOVE  Axis II: SEE ABOVE  Axis III: SEE ABOVE  Axis IV:  lack of structure  Axis V:  20 on admission, 55 on discharge     CC & HISTORY OF PRESENT ILLNESS:  \"Psychosis\"    The patient is a 50-year-old  female who is currently admitted on a TDO. She was brought into the ER in restraints for agitation. I attempted to interview the patient, but as soon as I entered her room, she started loudly saying \"I do not want to talk\" and declined answering questions. There was no further interaction possible with her. After arriving on the unit, the patient was quite agitated, aggressive with nursing staff and shouting. She has to be given IM p.r.n. to help her calm down, and she had been in her room since then. No new information available from the patient herself, and providing to the most in the ER, she was brought in with a history of being paranoid about her identity being stolen and had stopped taking medications many months ago because she was concerned that her medications were being poisoned. There is a history of her being on Lexapro and Suboxone after she was hospitalized in our facility last year in August.  She has been refusing to take any medications since her arrival on the unit. Reportedly, she used crack cocaine two days ago and used heroin. Her urine drug screen is positive for amphetamines and cocaine, but not for opiates. The patient could not add anything to her history. 04/10 - the patient has been visible; she slept 7.5 hours overnight and today has been describing ongoing safety concerns from her ex . She is oriented and organized on interview but reports significant anxiety from fears of her medication being 'switched.' Patient contracts for safety, she is in moderate distress during interview but able to make her needs known. She denies SI/HI but endorses vague AVH. She acknowledges having been prescribed antipsychotics in the past, which she took twice a day but did not remember the name of the agent. Patient ill appearing, agreed to restart Suboxone today and reassess after her TDO hearing. 04/11 - no acute overnight events. The patient has been visible, in moderate distress from ongoing withdrawal symptoms but able to make her needs known and with no thoughts of self harm. Patient slept 5 hours overnight and got PRN Zyprexa as well as Trazodone. Paranoid thoughts persist, the patient denies SI/AVH.  She is medication compliant and requests an increased dose of Suboxone. Patient amenable to starting an agent to address ongoing paranoid ideation as well as stimulant craving. 04/12 - the patient slept 5.25 overnight and remains in fair behavioral control. She reports improving physical health and vitals have been unremarkable. Patient with COWS score of 1 and she is anxious appearing but in no distress. She continues to report paranoid ideation and is ambivalent about rehab services, stating her last admission to rehab facility was unsucessful due to peers' calling her a snitch and switching her vapes. She is unable to reality test but asks about the timeline for these thoughts to subside. 04/13 - no acute overnight events. The patient is improved per RN report. She refused labs and AM dose of Risperdal, though she states this was due to confusion about which agent she was supposed to be taking. Patient tearful on interview, she continues to cite PI about her last rehab experience but voices understanding of the role of her medication and the importance of sobriety. Patient discharge focused, she is amenable to getting an TREJO prior to discharge.         SOCIAL HISTORY:    Social History     Socioeconomic History    Marital status: SINGLE     Spouse name: Not on file    Number of children: Not on file    Years of education: Not on file    Highest education level: Not on file   Occupational History    Not on file   Tobacco Use    Smoking status: Every Day     Packs/day: 1.00     Types: Cigarettes    Smokeless tobacco: Never   Substance and Sexual Activity    Alcohol use: Yes     Comment: 1-2 beers per day    Drug use: Yes     Types: Prescription     Comment: pt is on methadone    Sexual activity: Not on file   Other Topics Concern    Not on file   Social History Narrative    Not on file     Social Determinants of Health     Financial Resource Strain: Not on file   Food Insecurity: Not on file   Transportation Needs: Not on file Physical Activity: Not on file   Stress: Not on file   Social Connections: Not on file   Intimate Partner Violence: Not on file   Housing Stability: Not on file      FAMILY HISTORY:   History reviewed. No pertinent family history. HOSPITALIZATION COURSE:    Aggie Thompson was admitted to the inpatient psychiatric unit [facility] for acute psychiatric stabilization in regards to symptomatology as described in the HPI above. The differential diagnosis at time of admission included: schizophrenia vs substance induced psychotic disorder. While on the unit Aggie Thompson was involved in individual, group, occupational and milieu therapy. Psychiatric medications were adjusted during this hospitalization including Risperdal.   Aggie Thompson demonstrated a slow, but progressive improvement in overall condition. Much of patient's initial presentation appeared to be related to situational stressors, drugs of abuse, and psychological factors. Please see individual progress notes for more specific details regarding patient's hospitalization course. Patient with request for discharge today. There are no grounds to seek a TDO. At time of discharge, Aggie Thompson is without significant problems of depression, psychosis, or galo. Patient free of suicidal and homicidal ideations (appears to be at very low risk of suicide or homicide) and reports many positive predictive factors in terms of not attempting suicide or homicide. 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 Patient has maximized benefit to be derived from acute inpatient psychiatric treatment. All members of the treatment team concur with each other in regards to plans for discharge today. Patient aware and in agreement with discharge and discharge plan.          LABS AND IMAGAING:    Labs Reviewed   METABOLIC PANEL, COMPREHENSIVE - Abnormal; Notable for the following components:       Result Value    Glucose 106 (*)     BUN/Creatinine ratio 10 (*)     Protein, total 8.3 (*)     All other components within normal limits   URINALYSIS W/ REFLEX CULTURE - Abnormal; Notable for the following components:    Blood TRACE (*)     Leukocyte Esterase TRACE (*)     All other components within normal limits   DRUG SCREEN, URINE - Abnormal; Notable for the following components:    AMPHETAMINES Positive (*)     COCAINE Positive (*)     All other components within normal limits   COVID-19 WITH INFLUENZA A/B   CBC WITH AUTOMATED DIFF   ETHYL ALCOHOL   SARS-COV-2     No results found for: DS35, PHEN, PHENO, PHENT, DILF, DS39, PHENY, PTN, VALF2, VALAC, VALP, VALPR, DS6, CRBAM, CRBAMP, CARB2, XCRBAM  Admission on 04/08/2023   Component Date Value Ref Range Status    WBC 04/08/2023 7.5  3.6 - 11.0 K/uL Final    RBC 04/08/2023 4.80  3.80 - 5.20 M/uL Final    HGB 04/08/2023 14.7  11.5 - 16.0 g/dL Final    HCT 04/08/2023 42.6  35.0 - 47.0 % Final    MCV 04/08/2023 88.8  80.0 - 99.0 FL Final    MCH 04/08/2023 30.6  26.0 - 34.0 PG Final    MCHC 04/08/2023 34.5  30.0 - 36.5 g/dL Final    RDW 04/08/2023 12.9  11.5 - 14.5 % Final    PLATELET 05/86/4350 044  150 - 400 K/uL Final    MPV 04/08/2023 10.8  8.9 - 12.9 FL Final    NRBC 04/08/2023 0.0  0  WBC Final    ABSOLUTE NRBC 04/08/2023 0.00  0.00 - 0.01 K/uL Final    NEUTROPHILS 04/08/2023 74  32 - 75 % Final    LYMPHOCYTES 04/08/2023 17  12 - 49 % Final    MONOCYTES 04/08/2023 7  5 - 13 % Final    EOSINOPHILS 04/08/2023 1  0 - 7 % Final    BASOPHILS 04/08/2023 1  0 - 1 % Final    IMMATURE GRANULOCYTES 04/08/2023 0  0.0 - 0.5 % Final    ABS. NEUTROPHILS 04/08/2023 5.6  1.8 - 8.0 K/UL Final    ABS. LYMPHOCYTES 04/08/2023 1.3  0.8 - 3.5 K/UL Final    ABS. MONOCYTES 04/08/2023 0.5  0.0 - 1.0 K/UL Final    ABS. EOSINOPHILS 04/08/2023 0.1  0.0 - 0.4 K/UL Final    ABS.  BASOPHILS 04/08/2023 0.1  0.0 - 0.1 K/UL Final ABS. IMM. GRANS. 04/08/2023 0.0  0.00 - 0.04 K/UL Final    DF 04/08/2023 AUTOMATED    Final    Sodium 04/08/2023 138  136 - 145 mmol/L Final    Potassium 04/08/2023 3.5  3.5 - 5.1 mmol/L Final    Chloride 04/08/2023 100  97 - 108 mmol/L Final    CO2 04/08/2023 25  21 - 32 mmol/L Final    Anion gap 04/08/2023 13  5 - 15 mmol/L Final    Glucose 04/08/2023 106 (H)  65 - 100 mg/dL Final    BUN 04/08/2023 9  6 - 20 MG/DL Final    Creatinine 04/08/2023 0.87  0.55 - 1.02 MG/DL Final    BUN/Creatinine ratio 04/08/2023 10 (L)  12 - 20   Final    eGFR 04/08/2023 >60  >60 ml/min/1.73m2 Final    Calcium 04/08/2023 9.6  8.5 - 10.1 MG/DL Final    Bilirubin, total 04/08/2023 0.7  0.2 - 1.0 MG/DL Final    ALT (SGPT) 04/08/2023 21  12 - 78 U/L Final    AST (SGOT) 04/08/2023 17  15 - 37 U/L Final    Alk.  phosphatase 04/08/2023 114  45 - 117 U/L Final    Protein, total 04/08/2023 8.3 (H)  6.4 - 8.2 g/dL Final    Albumin 04/08/2023 4.4  3.5 - 5.0 g/dL Final    Globulin 04/08/2023 3.9  2.0 - 4.0 g/dL Final    A-G Ratio 04/08/2023 1.1  1.1 - 2.2   Final    ALCOHOL(ETHYL),SERUM 04/08/2023 <10  <10 MG/DL Final    Color 04/08/2023 YELLOW/STRAW    Final    Appearance 04/08/2023 CLEAR  CLEAR   Final    Specific gravity 04/08/2023 <1.005   Final    pH (UA) 04/08/2023 7.5  5.0 - 8.0   Final    Protein 04/08/2023 Negative  NEG mg/dL Final    Glucose 04/08/2023 Negative  NEG mg/dL Final    Ketone 04/08/2023 Negative  NEG mg/dL Final    Bilirubin 04/08/2023 Negative  NEG   Final    Blood 04/08/2023 TRACE (A)  NEG   Final    Urobilinogen 04/08/2023 1.0  0.2 - 1.0 EU/dL Final    Nitrites 04/08/2023 Negative  NEG   Final    Leukocyte Esterase 04/08/2023 TRACE (A)  NEG   Final    WBC 04/08/2023 0-4  0 - 4 /hpf Final    RBC 04/08/2023 0-5  0 - 5 /hpf Final    Epithelial cells 04/08/2023 FEW  FEW /lpf Final    Bacteria 04/08/2023 Negative  NEG /hpf Final    UA:UC IF INDICATED 04/08/2023 CULTURE NOT INDICATED BY UA RESULT  CNI   Final    AMPHETAMINES 04/08/2023 Positive (A)  NEG   Final    BARBITURATES 04/08/2023 Negative  NEG   Final    BENZODIAZEPINES 04/08/2023 Negative  NEG   Final    COCAINE 04/08/2023 Positive (A)  NEG   Final    METHADONE 04/08/2023 Negative  NEG   Final    OPIATES 04/08/2023 Negative  NEG   Final    PCP(PHENCYCLIDINE) 04/08/2023 Negative  NEG   Final    THC (TH-CANNABINOL) 04/08/2023 Negative  NEG   Final    Drug screen comment 04/08/2023 (NOTE)   Final    SARS-CoV-2 by PCR 04/08/2023 Not detected  NOTD   Final    Influenza A by PCR 04/08/2023 Not detected    Final    Influenza B by PCR 04/08/2023 Not detected    Final    Ventricular Rate 04/08/2023 81  BPM Final    Atrial Rate 04/08/2023 81  BPM Final    P-R Interval 04/08/2023 152  ms Final    QRS Duration 04/08/2023 76  ms Final    Q-T Interval 04/08/2023 380  ms Final    QTC Calculation (Bezet) 04/08/2023 441  ms Final    Calculated P Axis 04/08/2023 56  degrees Final    Calculated R Axis 04/08/2023 52  degrees Final    Calculated T Axis 04/08/2023 50  degrees Final    Diagnosis 04/08/2023    Final                    Value:Normal sinus rhythm  When compared with ECG of 10-JUL-2022 01:45,  No significant change was found  Confirmed by Sebastian Alanis M.D., Alexia Smart ([id number]) on 4/10/2023 10:14:08 AM      Specimen source 04/11/2023 Nasopharyngeal    Final    SARS-CoV-2 04/11/2023 Not detected  NOTD   Final     No results found. DISPOSITION:    Home. Patient to f/u with drug/etoh rehabilitation, psychiatric, and psychotherapy appointments. FOLLOW-UP CARE:    Activity as tolerated  Regular diet  Wound Care: none needed. Follow-up Information       Follow up With Specialties Details Why Contact Info    labs  Follow up in 1 week(s) Labs for antipsychotic monitoring (risperidone) were offered during your hospitalization but were refused. Please follow-up with your outpatient provider to check your cholesterol and blood sugar levels.      Monthly injection  Follow up on 5/12/2023 A monthly injection, called Perseris, was started during your hospitalization. Perseris 90 mg SQ injection was given on 4/14/23. Your next injection is due on 5/12/23. PROGNOSIS:   Fair ---- based on nature of patient's pathology/ies and treatment compliance issues. Prognosis is greatly dependent upon patient's ability to remain sober and to follow up with scheduled appointments as well as to comply with psychiatric medications as prescribed. DISCHARGE MEDICATIONS:     Informed consent given for the use of following psychotropic medications:  Current Discharge Medication List        START taking these medications    Details   buprenorphine-naloxone 8-2 mg subl 1 Tablet by SubLINGual route daily for 30 days. Max Daily Amount: 1 Tablet. Indications: opioid use disorder  Qty: 30 Tablet, Refills: 0  Start date: 4/15/2023, End date: 5/15/2023    Associated Diagnoses: Opioid use disorder, mild, abuse (HCC)      mirtazapine (REMERON) 15 mg tablet Take 1 Tablet by mouth nightly. Indications: major depressive disorder  Qty: 30 Tablet, Refills: 1  Start date: 4/14/2023      risperiDONE ER (PERSERIS) 90 mg subcutaneous injection 0.6 mL by SubCUTAneous route every month. Next administration due on 5/13/23  Indications: schizophrenia  Qty: 1 Each, Refills: 1  Start date: 4/14/2023                    A coordinated, multidisplinary treatment team round was conducted with Ailin Iglesias---this is done daily here at [facility]. This team consists of the nurse, psychiatric unit pharmacist,  and writer. I have spent greater than 35 minutes on discharge work.     Signed:  Lizette Carr MD  4/14/2023
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
## 2023-04-14 NOTE — PROGRESS NOTES
Problem: Depressed Mood (Adult/Pediatric)  Goal: *STG: Participates in treatment plan  Outcome: Progressing Towards Goal  Goal: *STG: Verbalizes anger, guilt, and other feelings in a constructive manor  Outcome: Progressing Towards Goal  Goal: *STG: Attends activities and groups  Outcome: Progressing Towards Goal  Goal: *STG: Demonstrates reduction in symptoms and increase in insight into coping skills/future focused  Outcome: Progressing Towards Goal  Goal: *STG: Remains safe in hospital  Outcome: Progressing Towards Goal  Goal: *STG: Complies with medication therapy  Outcome: Progressing Towards Goal     Problem: Falls - Risk of  Goal: *Absence of Falls  Description: Document Nimco Fall Risk and appropriate interventions in the flowsheet. Outcome: Progressing Towards Goal  Note: Fall Risk Interventions:  Medication Interventions: Teach patient to arise slowly      Patient received resting in her room. Denies SI/HI/AVH, depression and anxiety. Calm, cooperative, isolative to her room. Taking medications as prescribed. Will continue to monitor for safety.

## 2023-04-14 NOTE — BH NOTES
Alert and oriented patient x 4,flat affect with depressed mood,clear coherent speech and ambulant with steady gait. denied having anxiety,depression,pain,SI/HI and hallucinations of any kind. meal and medication compliant interacting appropriately in the milieu. No prn medications given. Safety checks in place and monitored per policy by staff. Slept for 8 hrs.

## 2023-04-14 NOTE — BH NOTES
DISCHARGE SUMMARY    Paula Hernandez  : 1970  MRN: [id number]    The patient Robby Setting exhibits the ability to control behavior in a less restrictive environment. Patient's level of functioning is improving. No assaultive/destructive behavior has been observed for the past 24 hours. No suicidal/homicidal threat or behavior has been observed for the past 24 hours. There is no evidence of serious medication side effects. Patient has not been in physical or protective restraints for at least the past 24 hours. If weapons involved, how are they secured? No weapons identified     Is patient aware of and in agreement with discharge plan? Yes, patient will return home with instructions to follow up with [facility] and [address] for medication:  Prescriptions sent to pharmacy     Copy of discharge instructions to provider?:      Arrangements for transportation home:  Rambo Rosales will pick patient up    Keep all follow up appointments as scheduled, continue to take prescribed medications per physician instructions.   Mental health crisis number:  478 or your local mental health crisis line number at   [place] [address] Emergency WARM LINE      [phone] ([phone])      M-F: 9am to 9pm      Sat & Sun: [facility] suicide prevention lines:                             2-129-RAPPVMN ([phone])       3-568-653-TALK ([phone])    Crisis Text Line:  Text HOME to [address], Supervisee in Social Work

## 2023-04-14 NOTE — PROGRESS NOTES
Problem: Altered Thought Process (Adult/Pediatric)  Goal: *STG: Participates in treatment plan  Outcome: Resolved/Met  Goal: *STG: Remains safe in hospital  Outcome: Resolved/Met  Goal: *STG: Seeks staff when feelings of anxiety and fear arise  Outcome: Resolved/Met  Goal: *STG: Complies with medication therapy  Outcome: Resolved/Met  Goal: *STG: Attends activities and groups  Outcome: Resolved/Met  Goal: *STG: Decreased delusional thinking  Outcome: Resolved/Met  Goal: *STG: Decreased hallucinations  Outcome: Resolved/Met  Goal: *STG: Absence of lethality  Outcome: Resolved/Met  Goal: *STG: Demonstrates ability to understand and use improved judgment in daily activities and relationships  Outcome: Resolved/Met  Goal: *LTG: Returns to baseline functioning  Outcome: Resolved/Met  Goal: Interventions  Outcome: Resolved/Met     Problem: Patient Education: Go to Patient Education Activity  Goal: Patient/Family Education  Outcome: Resolved/Met     Problem: Depressed Mood (Adult/Pediatric)  Goal: *STG: Participates in treatment plan  Outcome: Resolved/Met  Goal: *STG: Participates in 1:1 therapy sessions  Outcome: Resolved/Met  Goal: *STG: Verbalizes anger, guilt, and other feelings in a constructive manor  Outcome: Resolved/Met  Goal: *STG: Attends activities and groups  Outcome: Resolved/Met  Goal: *STG: Demonstrates reduction in symptoms and increase in insight into coping skills/future focused  Outcome: Resolved/Met  Goal: *STG: Remains safe in hospital  Outcome: Resolved/Met  Goal: *STG: Complies with medication therapy  Outcome: Resolved/Met  Goal: *LTG: Returns to previous level of functioning and participates with after care plan  Outcome: Resolved/Met  Goal: *LTG: Understands illness and can identify signs of relapse  Outcome: Resolved/Met  Goal: Interventions  Outcome: Resolved/Met     Problem: Patient Education: Go to Patient Education Activity  Goal: Patient/Family Education  Outcome: Resolved/Met Problem: Risk for Elopement  Goal: Patient will not exit the unit/facility without proper escort  Outcome: Resolved/Met     Problem: Falls - Risk of  Goal: *Absence of Falls  Description: Document Mikey Graft Fall Risk and appropriate interventions in the flowsheet.   Outcome: Resolved/Met     Problem: Patient Education: Go to Patient Education Activity  Goal: Patient/Family Education  Outcome: Resolved/Met

## 2023-05-17 ENCOUNTER — HOSPITAL ENCOUNTER (EMERGENCY)
Facility: HOSPITAL | Age: 53
Discharge: HOME | End: 2023-05-17
Attending: STUDENT IN AN ORGANIZED HEALTH CARE EDUCATION/TRAINING PROGRAM
Payer: COMMERCIAL

## 2023-05-17 VITALS
OXYGEN SATURATION: 98 % | HEART RATE: 96 BPM | TEMPERATURE: 97.6 F | RESPIRATION RATE: 16 BRPM | SYSTOLIC BLOOD PRESSURE: 118 MMHG | DIASTOLIC BLOOD PRESSURE: 78 MMHG
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
## 2023-05-17 LAB
ALBUMIN SERPL-MCNC: 4.6 G/DL (ref 3.5–5)
ALBUMIN/GLOB SERPL: 1 (ref 1.1–2.2)
ALP SERPL-CCNC: 109 U/L (ref 45–117)
ALT SERPL-CCNC: 23 U/L (ref 12–78)
ANION GAP SERPL CALC-SCNC: 8 MMOL/L (ref 5–15)
AST SERPL-CCNC: 16 U/L (ref 15–37)
BASOPHILS # BLD: 0 K/UL (ref 0–0.1)
BASOPHILS NFR BLD: 0 % (ref 0–1)
BILIRUB SERPL-MCNC: 0.5 MG/DL (ref 0.2–1)
BUN SERPL-MCNC: 12 MG/DL (ref 6–20)
BUN/CREAT SERPL: 8 (ref 12–20)
CALCIUM SERPL-MCNC: 10.1 MG/DL (ref 8.5–10.1)
CHLORIDE SERPL-SCNC: 102 MMOL/L (ref 97–108)
CLUE CELLS VAG QL WET PREP: NORMAL
CO2 SERPL-SCNC: 24 MMOL/L (ref 21–32)
COMMENT:: NORMAL
CREAT SERPL-MCNC: 1.59 MG/DL (ref 0.55–1.02)
DIFFERENTIAL METHOD BLD: ABNORMAL
EKG ATRIAL RATE: 79 BPM
EKG DIAGNOSIS: NORMAL
EKG P AXIS: 58 DEGREES
EKG P-R INTERVAL: 156 MS
EKG Q-T INTERVAL: 380 MS
EKG QRS DURATION: 72 MS
EKG QTC CALCULATION (BAZETT): 435 MS
EKG R AXIS: 34 DEGREES
EKG T AXIS: 39 DEGREES
EKG VENTRICULAR RATE: 79 BPM
EOSINOPHIL # BLD: 0.1 K/UL (ref 0–0.4)
EOSINOPHIL NFR BLD: 1 % (ref 0–7)
ERYTHROCYTE [DISTWIDTH] IN BLOOD BY AUTOMATED COUNT: 12.9 % (ref 11.5–14.5)
GLOBULIN SER CALC-MCNC: 4.6 G/DL (ref 2–4)
GLUCOSE SERPL-MCNC: 112 MG/DL (ref 65–100)
HBV SURFACE AB SER QL: NONREACTIVE
HBV SURFACE AB SER-ACNC: <3.1 MIU/ML
HBV SURFACE AG SER QL: <0.1 INDEX
HBV SURFACE AG SER QL: NEGATIVE
HCT VFR BLD AUTO: 42.8 % (ref 35–47)
HCV AB SER IA-ACNC: >11 INDEX
HCV AB SERPL QL IA: REACTIVE
HGB BLD-MCNC: 13.8 G/DL (ref 11.5–16)
HIV 1+2 AB+HIV1 P24 AG SERPL QL IA: NONREACTIVE
HIV 1/2 RESULT COMMENT: NORMAL
IMM GRANULOCYTES # BLD AUTO: 0 K/UL (ref 0–0.04)
IMM GRANULOCYTES NFR BLD AUTO: 0 % (ref 0–0.5)
KOH PREP SPEC: NORMAL
LYMPHOCYTES # BLD: 1.5 K/UL (ref 0.8–3.5)
LYMPHOCYTES NFR BLD: 17 % (ref 12–49)
MCH RBC QN AUTO: 29.7 PG (ref 26–34)
MCHC RBC AUTO-ENTMCNC: 32.2 G/DL (ref 30–36.5)
MCV RBC AUTO: 92.2 FL (ref 80–99)
MONOCYTES # BLD: 0.6 K/UL (ref 0–1)
MONOCYTES NFR BLD: 6 % (ref 5–13)
NEUTS SEG # BLD: 6.8 K/UL (ref 1.8–8)
NEUTS SEG NFR BLD: 76 % (ref 32–75)
NRBC # BLD: 0 K/UL (ref 0–0.01)
NRBC BLD-RTO: 0 PER 100 WBC
PLATELET # BLD AUTO: 245 K/UL (ref 150–400)
PMV BLD AUTO: 11 FL (ref 8.9–12.9)
POTASSIUM SERPL-SCNC: 3.5 MMOL/L (ref 3.5–5.1)
PROT SERPL-MCNC: 9.2 G/DL (ref 6.4–8.2)
RBC # BLD AUTO: 4.64 M/UL (ref 3.8–5.2)
SERVICE CMNT-IMP: NORMAL
SODIUM SERPL-SCNC: 134 MMOL/L (ref 136–145)
SPECIMEN HOLD: NORMAL
T VAGINALIS VAG QL WET PREP: NORMAL
WBC # BLD AUTO: 9.1 K/UL (ref 3.6–11)

## 2023-05-17 PROCEDURE — 93005 ELECTROCARDIOGRAM TRACING: CPT | Performed by: STUDENT IN AN ORGANIZED HEALTH CARE EDUCATION/TRAINING PROGRAM

## 2023-05-17 PROCEDURE — 87210 SMEAR WET MOUNT SALINE/INK: CPT

## 2023-05-17 PROCEDURE — 87591 N.GONORRHOEAE DNA AMP PROB: CPT

## 2023-05-17 PROCEDURE — 6370000000 HC RX 637 (ALT 250 FOR IP): Performed by: STUDENT IN AN ORGANIZED HEALTH CARE EDUCATION/TRAINING PROGRAM

## 2023-05-17 PROCEDURE — 86803 HEPATITIS C AB TEST: CPT

## 2023-05-17 PROCEDURE — 36415 COLL VENOUS BLD VENIPUNCTURE: CPT

## 2023-05-17 PROCEDURE — 87340 HEPATITIS B SURFACE AG IA: CPT

## 2023-05-17 PROCEDURE — 96372 THER/PROPH/DIAG INJ SC/IM: CPT

## 2023-05-17 PROCEDURE — 87389 HIV-1 AG W/HIV-1&-2 AB AG IA: CPT

## 2023-05-17 PROCEDURE — 99281 EMR DPT VST MAYX REQ PHY/QHP: CPT

## 2023-05-17 PROCEDURE — 99284 EMERGENCY DEPT VISIT MOD MDM: CPT

## 2023-05-17 PROCEDURE — 85025 COMPLETE CBC W/AUTO DIFF WBC: CPT

## 2023-05-17 PROCEDURE — 86706 HEP B SURFACE ANTIBODY: CPT

## 2023-05-17 PROCEDURE — 86592 SYPHILIS TEST NON-TREP QUAL: CPT

## 2023-05-17 PROCEDURE — 6360000002 HC RX W HCPCS: Performed by: STUDENT IN AN ORGANIZED HEALTH CARE EDUCATION/TRAINING PROGRAM

## 2023-05-17 PROCEDURE — 80053 COMPREHEN METABOLIC PANEL: CPT

## 2023-05-17 PROCEDURE — 86704 HEP B CORE ANTIBODY TOTAL: CPT

## 2023-05-17 PROCEDURE — 87491 CHLMYD TRACH DNA AMP PROBE: CPT

## 2023-05-17 RX ORDER — EMTRICITABINE AND TENOFOVIR DISOPROXIL FUMARATE 200; 300 MG/1; MG/1
1 TABLET, FILM COATED ORAL DAILY
Qty: 28 TABLET | Refills: 0 | Status: SHIPPED | OUTPATIENT
Start: 2023-05-17 | End: 2023-06-14

## 2023-05-17 RX ORDER — ONDANSETRON 4 MG/1
4 TABLET, ORALLY DISINTEGRATING ORAL ONCE
Status: COMPLETED | OUTPATIENT
Start: 2023-05-17 | End: 2023-05-17

## 2023-05-17 RX ORDER — CEFTRIAXONE 500 MG/1
500 INJECTION, POWDER, FOR SOLUTION INTRAMUSCULAR; INTRAVENOUS ONCE
Status: COMPLETED | OUTPATIENT
Start: 2023-05-17 | End: 2023-05-17

## 2023-05-17 RX ORDER — LIDOCAINE HYDROCHLORIDE 10 MG/ML
1 INJECTION, SOLUTION EPIDURAL; INFILTRATION; INTRACAUDAL; PERINEURAL ONCE
Status: DISCONTINUED | OUTPATIENT
Start: 2023-05-17 | End: 2023-05-17 | Stop reason: HOSPADM

## 2023-05-17 RX ORDER — EMTRICITABINE AND TENOFOVIR DISOPROXIL FUMARATE 200; 300 MG/1; MG/1
1 TABLET, FILM COATED ORAL
Status: COMPLETED | OUTPATIENT
Start: 2023-05-17 | End: 2023-05-17

## 2023-05-17 RX ORDER — ONDANSETRON 4 MG/1
4 TABLET, ORALLY DISINTEGRATING ORAL 3 TIMES DAILY PRN
Qty: 42 TABLET | Refills: 0 | Status: SHIPPED | OUTPATIENT
Start: 2023-05-17 | End: 2023-05-31

## 2023-05-17 RX ORDER — AZITHROMYCIN 250 MG/1
1000 TABLET, FILM COATED ORAL
Status: COMPLETED | OUTPATIENT
Start: 2023-05-17 | End: 2023-05-17

## 2023-05-17 RX ADMIN — AZITHROMYCIN DIHYDRATE 1000 MG: 250 TABLET, FILM COATED ORAL at 07:48

## 2023-05-17 RX ADMIN — ONDANSETRON 4 MG: 4 TABLET, ORALLY DISINTEGRATING ORAL at 07:48

## 2023-05-17 RX ADMIN — EMTRICITABINE AND TENOFOVIR DISOPROXIL FUMARATE 1 TABLET: 200; 300 TABLET, FILM COATED ORAL at 08:25

## 2023-05-17 RX ADMIN — RALTEGRAVIR 400 MG: 400 TABLET, FILM COATED ORAL at 08:25

## 2023-05-17 RX ADMIN — CEFTRIAXONE SODIUM 500 MG: 500 INJECTION, POWDER, FOR SOLUTION INTRAMUSCULAR; INTRAVENOUS at 07:48

## 2023-05-17 ASSESSMENT — ENCOUNTER SYMPTOMS
VOMITING: 0
SHORTNESS OF BREATH: 0
ABDOMINAL PAIN: 0
NAUSEA: 0
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
## 2023-05-18 LAB
HBV CORE AB SERPL QL IA: NEGATIVE
RPR SER QL: NONREACTIVE

## 2023-05-20 LAB
C TRACH RRNA SPEC QL NAA+PROBE: NEGATIVE
N GONORRHOEA RRNA SPEC QL NAA+PROBE: NEGATIVE
SPECIMEN SOURCE: NORMAL

## 2024-07-19 ENCOUNTER — TRANSCRIBE ORDERS (OUTPATIENT)
Facility: HOSPITAL | Age: 54
End: 2024-07-19

## 2024-07-19 DIAGNOSIS — Z12.31 SCREENING MAMMOGRAM FOR BREAST CANCER: Primary | ICD-10-CM

## 2024-08-07 ENCOUNTER — HOSPITAL ENCOUNTER (OUTPATIENT)
Facility: HOSPITAL | Age: 54
Discharge: HOME | End: 2024-08-10
Payer: COMMERCIAL

## 2024-08-07 VITALS — HEIGHT: 64 IN | BODY MASS INDEX: 27.66 KG/M2 | WEIGHT: 162 LBS

## 2024-08-07 DIAGNOSIS — Z12.31 SCREENING MAMMOGRAM FOR BREAST CANCER: ICD-10-CM

## 2024-08-07 PROCEDURE — 77063 BREAST TOMOSYNTHESIS BI: CPT

## 2025-07-23 ENCOUNTER — TRANSCRIBE ORDERS (OUTPATIENT)
Facility: HOSPITAL | Age: 55
End: 2025-07-23

## 2025-07-23 DIAGNOSIS — Z12.31 OTHER SCREENING MAMMOGRAM: Primary | ICD-10-CM

## (undated) DEVICE — CLEAR-TRAC THREADED CANNULA WITH                                    OBTURATOR 8 MM I.D. X 76 MM,                                    STERILE, LATEX FREE, BOX OF 10: Brand: CLEAR-TRAC

## (undated) DEVICE — Device

## (undated) DEVICE — TOWEL SURG W17XL27IN STD BLU COT NONFENESTRATED PREWASHED

## (undated) DEVICE — 1010 S-DRAPE TOWEL DRAPE 10/BX: Brand: STERI-DRAPE™

## (undated) DEVICE — SWAB CULT DBL W/O CHAR RAYON TIP AMIES GEL CLMN FOR COLL

## (undated) DEVICE — ARTHROSCOPY RICHMOND-LF: Brand: MEDLINE INDUSTRIES, INC.

## (undated) DEVICE — ROCKER SWITCH PENCIL BLADE ELECTRODE, HOLSTER: Brand: EDGE

## (undated) DEVICE — STRETCH BANDAGE ROLL: Brand: DERMACEA

## (undated) DEVICE — STERILE POLYISOPRENE POWDER-FREE SURGICAL GLOVES: Brand: PROTEXIS

## (undated) DEVICE — 3000CC GUARDIAN II: Brand: GUARDIAN

## (undated) DEVICE — BIPOLAR FORCEPS CORD: Brand: VALLEYLAB

## (undated) DEVICE — DRAPE,U/ SHT,SPLIT,PLAS,STERIL: Brand: MEDLINE

## (undated) DEVICE — INFECTION CONTROL KIT SYS

## (undated) DEVICE — PADDING CAST W3INXL4YD NONSTERILE COT COHESIVE HND TEARABLE

## (undated) DEVICE — GAUZE SPONGES,12 PLY: Brand: CURITY

## (undated) DEVICE — STERILE POLYISOPRENE POWDER-FREE SURGICAL GLOVES WITH EMOLLIENT COATING: Brand: PROTEXIS

## (undated) DEVICE — BNDG ELAS HK LOOP 3X5YD NS -- MATRIX

## (undated) DEVICE — DRAPE,HAND,STERILE: Brand: MEDLINE

## (undated) DEVICE — (D)PREP SKN CHLRAPRP APPL 26ML -- CONVERT TO ITEM 371833

## (undated) DEVICE — 4.5 MM HELICUT STRAIGHT BURRS,                                    POWER/EP-1, SLATE, 5000 MAXIMUM RPM,                                    PACKAGED 6 PER BOX, STERILE: Brand: DYONICS HELICUT

## (undated) DEVICE — SUT ETHLN 2-0 18IN FS BLK --

## (undated) DEVICE — SOLUTION IV 1000ML 0.9% SOD CHL

## (undated) DEVICE — TOURNIQUET PHLEB L EXSANGUINATING FOR AD DGT TOURNI-COT

## (undated) DEVICE — STRAP,POSITIONING,KNEE/BODY,FOAM,4X60": Brand: MEDLINE

## (undated) DEVICE — 4-PORT MANIFOLD: Brand: NEPTUNE 2

## (undated) DEVICE — SPONGE GZ W4XL4IN COT 12 PLY TYP VII WVN C FLD DSGN

## (undated) DEVICE — SKIN MARKER FINE TIP WITH RULER: Brand: DEVON

## (undated) DEVICE — DYONICS 25 INFLOW TUBE SET, 3 PER BOX

## (undated) DEVICE — APPLICATOR BNDG 1MM ADH PREMIERPRO EXOFIN

## (undated) DEVICE — HANDLE LT SNAP ON ULT DURABLE LENS FOR TRUMPF ALC DISPOSABLE

## (undated) DEVICE — ASTOUND STANDARD SURGICAL GOWN, XXL: Brand: CONVERTORS

## (undated) DEVICE — GOWN,SIRUS,FABRNF,XL,20/CS: Brand: MEDLINE

## (undated) DEVICE — SUT ETHLN 5-0 18IN P3 BLK --

## (undated) DEVICE — CURITY NON-ADHERENT STRIPS: Brand: CURITY

## (undated) DEVICE — NEEDLE HYPO 25GA L1.5IN BVL ORIENTED ECLIPSE

## (undated) DEVICE — HANDPIECE SET WITH BONE CLEANING TIP AND SUCTION TUBE: Brand: INTERPULSE

## (undated) DEVICE — BANDAGE COMPR SELF ADH 5 YDX2 IN TAN NS PREMIERPRO LF

## (undated) DEVICE — SOLUTION IRRIG 3000ML LAC R FLX CONT

## (undated) DEVICE — PADDING CAST 3 INX4 YD STRL

## (undated) DEVICE — SYR 10ML LUER LOK 1/5ML GRAD --

## (undated) DEVICE — DRAPE,REIN 53X77,STERILE: Brand: MEDLINE

## (undated) DEVICE — PACK,BASIC,SIRUS,V: Brand: MEDLINE

## (undated) DEVICE — SURGICAL PROCEDURE PACK BASIN MAJ SET CUST NO CAUT

## (undated) DEVICE — UNIV CANN 5MM/76MM LTX FREE (10) BLUE

## (undated) DEVICE — GRADUATED BOWL: Brand: DEVON

## (undated) DEVICE — PAD,ABDOMINAL,5"X9",ST,LF,25/BX: Brand: MEDLINE INDUSTRIES, INC.

## (undated) DEVICE — REM POLYHESIVE ADULT PATIENT RETURN ELECTRODE: Brand: VALLEYLAB

## (undated) DEVICE — BANDAGE COMPR 9 FTX4 IN SMOOTH COMFORTABLE SYNTH ESMRK LF

## (undated) DEVICE — DRAPE,EXTREMITY,89X128,STERILE: Brand: MEDLINE

## (undated) DEVICE — BANDAGE COMPR W6INXL10YD ST M E WHITE/BEIGE

## (undated) DEVICE — CONNECTOR,TUBING,5-IN-1,NON-STERILE: Brand: MEDLINE INDUSTRIES, INC.